# Patient Record
Sex: MALE | Race: WHITE | NOT HISPANIC OR LATINO | Employment: OTHER | URBAN - METROPOLITAN AREA
[De-identification: names, ages, dates, MRNs, and addresses within clinical notes are randomized per-mention and may not be internally consistent; named-entity substitution may affect disease eponyms.]

---

## 2017-03-09 RX ORDER — LOSARTAN POTASSIUM 100 MG/1
100 TABLET ORAL DAILY
Qty: 90 TAB | Refills: 0 | Status: SHIPPED | OUTPATIENT
Start: 2017-03-09 | End: 2017-06-02 | Stop reason: SDUPTHER

## 2017-03-09 RX ORDER — LOSARTAN POTASSIUM 100 MG/1
100 TABLET ORAL
COMMUNITY
Start: 2016-04-11 | End: 2017-03-09 | Stop reason: SDUPTHER

## 2017-03-09 NOTE — TELEPHONE ENCOUNTER
Was the patient seen in the last year in this department? No     Does patient have an active prescription for medications requested? No     Received Request Via: Patient     Patient is establishing care from [facility] in June.

## 2017-06-02 ENCOUNTER — OFFICE VISIT (OUTPATIENT)
Dept: MEDICAL GROUP | Facility: MEDICAL CENTER | Age: 62
End: 2017-06-02
Payer: COMMERCIAL

## 2017-06-02 VITALS
OXYGEN SATURATION: 98 % | TEMPERATURE: 98.1 F | DIASTOLIC BLOOD PRESSURE: 78 MMHG | SYSTOLIC BLOOD PRESSURE: 124 MMHG | HEART RATE: 60 BPM | HEIGHT: 64 IN | BODY MASS INDEX: 25.27 KG/M2 | WEIGHT: 148 LBS

## 2017-06-02 DIAGNOSIS — Z13.0 SCREENING FOR DEFICIENCY ANEMIA: ICD-10-CM

## 2017-06-02 DIAGNOSIS — Z13.1 SCREENING FOR DIABETES MELLITUS: ICD-10-CM

## 2017-06-02 DIAGNOSIS — N40.1 BPH WITH OBSTRUCTION/LOWER URINARY TRACT SYMPTOMS: ICD-10-CM

## 2017-06-02 DIAGNOSIS — I10 ESSENTIAL (PRIMARY) HYPERTENSION: ICD-10-CM

## 2017-06-02 DIAGNOSIS — N13.8 BPH WITH OBSTRUCTION/LOWER URINARY TRACT SYMPTOMS: ICD-10-CM

## 2017-06-02 DIAGNOSIS — Z12.5 SCREENING FOR PROSTATE CANCER: ICD-10-CM

## 2017-06-02 DIAGNOSIS — E78.2 MULTIPLE-TYPE HYPERLIPIDEMIA: ICD-10-CM

## 2017-06-02 DIAGNOSIS — Z13.29 SCREENING FOR THYROID DISORDER: ICD-10-CM

## 2017-06-02 DIAGNOSIS — Z11.59 NEED FOR HEPATITIS C SCREENING TEST: ICD-10-CM

## 2017-06-02 DIAGNOSIS — Z12.11 SCREENING FOR COLON CANCER: ICD-10-CM

## 2017-06-02 DIAGNOSIS — N43.40 SPERMATOCELE: ICD-10-CM

## 2017-06-02 PROCEDURE — 99214 OFFICE O/P EST MOD 30 MIN: CPT | Performed by: FAMILY MEDICINE

## 2017-06-02 RX ORDER — LOSARTAN POTASSIUM 100 MG/1
100 TABLET ORAL DAILY
Qty: 90 TAB | Refills: 3 | Status: SHIPPED | OUTPATIENT
Start: 2017-06-02 | End: 2018-03-27 | Stop reason: SDUPTHER

## 2017-06-02 NOTE — MR AVS SNAPSHOT
"        Chaparro Toddi   2017 9:40 AM   Office Visit   MRN: [id number]    Department:  [facility]   Dept Phone:  [phone]    Description:  Male : 1955   Provider:  Marylin Barry M.D.           Reason for Visit     Establish Care     Medication Refill           Allergies as of 2017     No Known Allergies      You were diagnosed with     BPH with obstruction/lower urinary tract symptoms   [969883]       Essential (primary) hypertension   [171164]       Multiple-type hyperlipidemia   [500928]       Spermatocele   [608.1.ICD-9-CM]       Screening for deficiency anemia   [142097]       Screening for diabetes mellitus   [V77.1.ICD-9-CM]       Screening for thyroid disorder   [V77.0.ICD-9-CM]       Screening for colon cancer   [509210]       Screening for prostate cancer   [236307]       Need for hepatitis C screening test   [060627]         Vital Signs     Blood Pressure Pulse Temperature Height Weight Body Mass Index    124/78 mmHg 60 36.7 °C (98.1 °F) 1.626 m (5' 4\") 67.132 kg (148 lb) 25.39 kg/m2    Oxygen Saturation                   98%           Basic Information     Date Of Birth Sex Race Ethnicity Preferred Language    1955 Male White Non- English      Problem List              ICD-10-CM Priority Class Noted - Resolved    Essential (primary) hypertension I10   3/23/2016 - Present    Multiple-type hyperlipidemia E78.4   3/23/2016 - Present    BPH with obstruction/lower urinary tract symptoms N40.1, N13.8   2017 - Present    Spermatocele N43.40   2017 - Present      Health Maintenance        Date Due Completion Dates    IMM DTaP/Tdap/Td Vaccine (1 - Tdap) 1972 ---    COLONOSCOPY 2003 ---    IMM ZOSTER VACCINE 2013 ---            Current Immunizations     No immunizations on file.      Below and/or attached are the medications your provider expects you to take. Review all of your home medications and newly ordered medications with your provider and/or " pharmacist. Follow medication instructions as directed by your provider and/or pharmacist. Please keep your medication list with you and share with your provider. Update the information when medications are discontinued, doses are changed, or new medications (including over-the-counter products) are added; and carry medication information at all times in the event of emergency situations     Allergies:  No Known Allergies          Medications  Valid as of: June 02, 2017 - 11:56 AM    Generic Name Brand Name Tablet Size Instructions for use    Losartan Potassium (Tab) COZAAR 100 MG Take 1 Tab by mouth every day.        .                 Medicines prescribed today were sent to:     [facility] [address] [address]    Phone: [phone] Fax: [phone]    Open 24 Hours?: No      Medication refill instructions:       If your prescription bottle indicates you have medication refills left, it is not necessary to call your provider’s office. Please contact your pharmacy and they will refill your medication.    If your prescription bottle indicates you do not have any refills left, you may request refills at any time through one of the following ways: The online [employer] system (except Urgent Care), by calling your provider’s office, or by asking your pharmacy to contact your provider’s office with a refill request. Medication refills are processed only during regular business hours and may not be available until the next business day. Your provider may request additional information or to have a follow-up visit with you prior to refilling your medication.   *Please Note: Medication refills are assigned a new Rx number when refilled electronically. Your pharmacy may indicate that no refills were authorized even though a new prescription for the same medication is available at the pharmacy. Please request the medicine by name with the pharmacy  before contacting your provider for a refill.        Referral     A referral request has been sent to our patient care coordination department. Please allow 3-5 business days for us to process this request and contact you either by phone or mail. If you do not hear from us by the 5th business day, please call us at [phone].           MyChart Status: Patient Declined

## 2017-06-07 NOTE — ASSESSMENT & PLAN NOTE
Dyslipidemia Chronic condition. Current treatments: diet/exercise. He he was on medication in the past but does not want to go back on it. He would like to manage with diet and exercise.

## 2017-06-07 NOTE — ASSESSMENT & PLAN NOTE
Patient has a history of a spermatocele that was found on ultrasound. Patient never did anything about it but he thinks it might be enlarging. He is requesting that he be referred to a urologist.

## 2017-06-07 NOTE — ASSESSMENT & PLAN NOTE
Patient complains of increased urination throughout the day and having to get up at night once or twice. He has difficulty starting his stream and it is less than before. He has been diagnosed with BPH in the past but the symptoms seem to be worsening. He is not comfortable with me doing a digital rectal exam on him and would like a referral to urology.   He denies any hematuria or dark urine.

## 2017-06-07 NOTE — ASSESSMENT & PLAN NOTE
Stable. Currently taking losartan 100 as directed.   He is not taking baby aspirin daily.   He is not monitoring BP at home.   Denies symptoms low BP: light-headed, tunnel-vision, unusual fatigue.   Denies symptoms high BP:pounding headache, visual changes, palpitations, flushed face.   Denies medicine side effects: unusual fatigue, slow heartbeat, foot/leg swelling, cough.

## 2017-06-07 NOTE — PROGRESS NOTES
Chief Complaint   Patient presents with   • Establish Care   • Medication Refill     Chaparro is here to establish care at this office. he is a previous patient of mine at the [facility].      Chaparro Rosenberg is a 62 y.o. male here to establish care and for evaluation and management of:        HPI:    BPH with obstruction/lower urinary tract symptoms  Patient complains of increased urination throughout the day and having to get up at night once or twice. He has difficulty starting his stream and it is less than before. He has been diagnosed with BPH in the past but the symptoms seem to be worsening. He is not comfortable with me doing a digital rectal exam on him and would like a referral to urology.   He denies any hematuria or dark urine.     Multiple-type hyperlipidemia  Dyslipidemia Chronic condition. Current treatments: diet/exercise. He he was on medication in the past but does not want to go back on it. He would like to manage with diet and exercise.    Essential (primary) hypertension  Stable. Currently taking losartan 100 as directed.   He is not taking baby aspirin daily.   He is not monitoring BP at home.   Denies symptoms low BP: light-headed, tunnel-vision, unusual fatigue.   Denies symptoms high BP:pounding headache, visual changes, palpitations, flushed face.   Denies medicine side effects: unusual fatigue, slow heartbeat, foot/leg swelling, cough.      Spermatocele  Patient has a history of a spermatocele that was found on ultrasound. Patient never did anything about it but he thinks it might be enlarging. He is requesting that he be referred to a urologist.      No Known Allergies    Current medicines (including changes today)  Current Outpatient Prescriptions   Medication Sig Dispense Refill   • losartan (COZAAR) 100 MG Tab Take 1 Tab by mouth every day. 90 Tab 3     No current facility-administered medications for this visit.     He  has a past medical history of Hyperlipidemia and Hypertension.  He  has  "past surgical history that includes hernia repair.  Social History   Substance Use Topics   • Smoking status: Former Smoker -- 1.00 packs/day for 20 years     Types: Cigarettes     Quit date: 1989   • Smokeless tobacco: Never Used   • Alcohol Use: 3.6 oz/week     3 Glasses of wine, 3 Shots of liquor per week     Social History     Social History Narrative     Family History   Problem Relation Age of Onset   • Arthritis Mother    • Stroke Mother    • Hypertension Father      Family Status   Relation Status Death Age   • Mother     • Father     • Sister Alive          ROS  No fever or chills.  No nausea or vomiting.  No chest pain or palpitations.  No cough or SOB.  No pain with urination or hematuria.  No black or bloody stools.  All other systems reviewed and are negative     Objective:     Blood pressure 124/78, pulse 60, temperature 36.7 °C (98.1 °F), height 1.626 m (5' 4\"), weight 67.132 kg (148 lb), SpO2 98 %. Body mass index is 25.39 kg/(m^2).  Physical Exam:      Well developed, well nourished.  Alert, oriented in no acute distress.  Psych: Eye contact is good, speech goal directed, affect calm  Eyes: conjunctiva non-injected, sclera non-icteric.  Neck Supple.  No adenopathy or masses in the neck or supraclavicular regions. No thyromegaly  Lungs: clear to auscultation bilaterally with good excursion. No wheezes or rhonchi  CV: regular rate and rhythm. No murmur  Abdomen: soft, nontender, no masses or organomegaly.  No rebound or guarding  Ext: no edema, color normal, vascularity normal, temperature normal      Assessment and Plan:   The following treatment plan was discussed    1. BPH with obstruction/lower urinary tract symptoms  Check PSA. Refer to urology for further evaluation and treatment.  - PROSTATE SPECIFIC AG  - REFERRAL TO UROLOGY    2. Essential (primary) hypertension  Check chemistry panel. Continue losartan 100 mg daily  - COMP METABOLIC PANEL  - losartan (COZAAR) 100 MG " Tab; Take 1 Tab by mouth every day.  Dispense: 90 Tab; Refill: 3    3. Multiple-type hyperlipidemia  Dietary counseling done. Encouraged continued exercise. Check lipid panel  - LIPID PANEL W/ CHOL/HDL RATIO    4. Spermatocele  Refer to urology  - REFERRAL TO UROLOGY    5. Screening for deficiency anemia  Screening labs ordered.  Await results for counseling.    - CBC WITH DIFFERENTIAL    6. Screening for diabetes mellitus  Screening labs ordered.  Await results for counseling.    - COMP METABOLIC PANEL    7. Screening for thyroid disorder  Screening labs ordered.  Await results for counseling.    - TSH    8. Screening for colon cancer  Screening labs ordered.  Await results for counseling.    - REFERRAL TO GASTROENTEROLOGY    9. Screening for prostate cancer  Screening labs ordered.  Await results for counseling.    - PROSTATE SPECIFIC AG    10. Need for hepatitis C screening test  Screening labs ordered.  Await results for counseling.    - HEP C VIRUS ANTIBODY      Records requested.    Any change or worsening of signs or symptoms, patient encouraged to follow-up or report to the emergency room for further evaluation. Patient understands and agrees.    Followup: Return in about 1 year (around 6/2/2018).

## 2017-06-15 LAB
ALBUMIN SERPL-MCNC: 4.3 G/DL (ref 3.6–4.8)
ALBUMIN/GLOB SERPL: 2.2 {RATIO} (ref 1.2–2.2)
ALP SERPL-CCNC: 54 IU/L (ref 39–117)
ALT SERPL-CCNC: 23 IU/L (ref 0–44)
AST SERPL-CCNC: 20 IU/L (ref 0–40)
BASOPHILS # BLD AUTO: 0 X10E3/UL (ref 0–0.2)
BASOPHILS NFR BLD AUTO: 1 %
BILIRUB SERPL-MCNC: 0.3 MG/DL (ref 0–1.2)
BUN SERPL-MCNC: 14 MG/DL (ref 8–27)
BUN/CREAT SERPL: 15 (ref 10–24)
CALCIUM SERPL-MCNC: 9.2 MG/DL (ref 8.6–10.2)
CHLORIDE SERPL-SCNC: 104 MMOL/L (ref 96–106)
CHOLEST SERPL-MCNC: 181 MG/DL (ref 100–199)
CHOLEST/HDLC SERPL: 3.4 RATIO UNITS (ref 0–5)
CO2 SERPL-SCNC: 21 MMOL/L (ref 18–29)
COMMENT 011824: ABNORMAL
CREAT SERPL-MCNC: 0.96 MG/DL (ref 0.76–1.27)
EOSINOPHIL # BLD AUTO: 0 X10E3/UL (ref 0–0.4)
EOSINOPHIL NFR BLD AUTO: 0 %
ERYTHROCYTE [DISTWIDTH] IN BLOOD BY AUTOMATED COUNT: 15.1 % (ref 12.3–15.4)
GLOBULIN SER CALC-MCNC: 2 G/DL (ref 1.5–4.5)
GLUCOSE SERPL-MCNC: 97 MG/DL (ref 65–99)
HCT VFR BLD AUTO: 45.3 % (ref 37.5–51)
HCV AB S/CO SERPL IA: <0.1 S/CO RATIO (ref 0–0.9)
HDLC SERPL-MCNC: 54 MG/DL
HGB BLD-MCNC: 15.3 G/DL (ref 12.6–17.7)
IMM GRANULOCYTES # BLD: 0 X10E3/UL (ref 0–0.1)
IMM GRANULOCYTES NFR BLD: 0 %
IMMATURE CELLS  115398: NORMAL
LDLC SERPL CALC-MCNC: 101 MG/DL (ref 0–99)
LYMPHOCYTES # BLD AUTO: 2.4 X10E3/UL (ref 0.7–3.1)
LYMPHOCYTES NFR BLD AUTO: 40 %
MCH RBC QN AUTO: 31.3 PG (ref 26.6–33)
MCHC RBC AUTO-ENTMCNC: 33.8 G/DL (ref 31.5–35.7)
MCV RBC AUTO: 93 FL (ref 79–97)
MONOCYTES # BLD AUTO: 0.5 X10E3/UL (ref 0.1–0.9)
MONOCYTES NFR BLD AUTO: 8 %
MORPHOLOGY BLD-IMP: NORMAL
NEUTROPHILS # BLD AUTO: 3.2 X10E3/UL (ref 1.4–7)
NEUTROPHILS NFR BLD AUTO: 51 %
NRBC BLD AUTO-RTO: NORMAL %
PLATELET # BLD AUTO: 262 X10E3/UL (ref 150–379)
POTASSIUM SERPL-SCNC: 4.5 MMOL/L (ref 3.5–5.2)
PROT SERPL-MCNC: 6.3 G/DL (ref 6–8.5)
PSA SERPL-MCNC: 1 NG/ML (ref 0–4)
RBC # BLD AUTO: 4.89 X10E6/UL (ref 4.14–5.8)
SODIUM SERPL-SCNC: 139 MMOL/L (ref 134–144)
TRIGL SERPL-MCNC: 130 MG/DL (ref 0–149)
TSH SERPL DL<=0.005 MIU/L-ACNC: 2.64 UIU/ML (ref 0.45–4.5)
VLDLC SERPL CALC-MCNC: 26 MG/DL (ref 5–40)
WBC # BLD AUTO: 6.2 X10E3/UL (ref 3.4–10.8)

## 2017-06-16 ENCOUNTER — TELEPHONE (OUTPATIENT)
Dept: MEDICAL GROUP | Facility: MEDICAL CENTER | Age: 62
End: 2017-06-16

## 2017-06-16 NOTE — TELEPHONE ENCOUNTER
----- Message from Marylin Barry M.D. sent at 6/16/2017 11:25 AM PDT -----  Please notify patient of their normal lab results.  Marylin Barry M.D.

## 2018-01-04 ENCOUNTER — TELEPHONE (OUTPATIENT)
Dept: MEDICAL GROUP | Facility: MEDICAL CENTER | Age: 63
End: 2018-01-04

## 2018-01-04 DIAGNOSIS — N40.1 BPH WITH OBSTRUCTION/LOWER URINARY TRACT SYMPTOMS: ICD-10-CM

## 2018-01-04 DIAGNOSIS — N13.8 BPH WITH OBSTRUCTION/LOWER URINARY TRACT SYMPTOMS: ICD-10-CM

## 2018-01-04 DIAGNOSIS — N43.40 SPERMATOCELE: ICD-10-CM

## 2018-01-05 NOTE — TELEPHONE ENCOUNTER
1. Caller Name: Chaparro Rosenberg                        Call Back Number: [phone] (home) [phone] (work)      2. Message: Patient called states he was referred [place] [facility] [place] last year and was never taken care of properly he needs to also have a hernia repair patient is wondering if you could place a referral to another urology group who will be able to help him and will take care of him as soon as they can.      3. Patient approves office to leave a detailed voicemail/MyChart message: yes

## 2018-03-27 DIAGNOSIS — I10 ESSENTIAL (PRIMARY) HYPERTENSION: ICD-10-CM

## 2018-03-27 RX ORDER — LOSARTAN POTASSIUM 100 MG/1
100 TABLET ORAL DAILY
Qty: 90 TAB | Refills: 0 | Status: SHIPPED | OUTPATIENT
Start: 2018-03-27 | End: 2018-07-06 | Stop reason: SDUPTHER

## 2018-03-27 NOTE — TELEPHONE ENCOUNTER
Was the patient seen in the last year in this department? Yes     Does patient have an active prescription for medications requested? No     Received Request Via: Patient     Pt called and states he is running very low on his BP medication. Asking to have this sent to [facility] in [place] as he no longer has [place] insurance. Pharmacy updated in chart.

## 2018-03-29 ENCOUNTER — HOSPITAL ENCOUNTER (INPATIENT)
Facility: MEDICAL CENTER | Age: 63
LOS: 6 days | DRG: 863 | End: 2018-04-04
Attending: EMERGENCY MEDICINE | Admitting: STUDENT IN AN ORGANIZED HEALTH CARE EDUCATION/TRAINING PROGRAM
Payer: MEDICARE

## 2018-03-29 ENCOUNTER — APPOINTMENT (OUTPATIENT)
Dept: RADIOLOGY | Facility: MEDICAL CENTER | Age: 63
DRG: 863 | End: 2018-03-29
Attending: EMERGENCY MEDICINE
Payer: MEDICARE

## 2018-03-29 ENCOUNTER — RESOLUTE PROFESSIONAL BILLING HOSPITAL PROF FEE (OUTPATIENT)
Dept: HOSPITALIST | Facility: MEDICAL CENTER | Age: 63
End: 2018-03-29
Payer: MEDICARE

## 2018-03-29 DIAGNOSIS — N49.2 SCROTAL INFECTION: ICD-10-CM

## 2018-03-29 DIAGNOSIS — L03.314 CELLULITIS OF GROIN: ICD-10-CM

## 2018-03-29 PROBLEM — D72.829 LEUKOCYTOSIS: Status: ACTIVE | Noted: 2018-03-29

## 2018-03-29 PROBLEM — L03.90 CELLULITIS: Status: ACTIVE | Noted: 2018-03-29

## 2018-03-29 PROBLEM — N43.3 HYDROCELE: Status: ACTIVE | Noted: 2018-03-29

## 2018-03-29 LAB
ALBUMIN SERPL BCP-MCNC: 3.3 G/DL (ref 3.2–4.9)
ALBUMIN/GLOB SERPL: 1.2 G/DL
ALP SERPL-CCNC: 54 U/L (ref 30–99)
ALT SERPL-CCNC: 15 U/L (ref 2–50)
ANION GAP SERPL CALC-SCNC: 6 MMOL/L (ref 0–11.9)
APPEARANCE UR: CLEAR
AST SERPL-CCNC: 17 U/L (ref 12–45)
BASOPHILS # BLD AUTO: 0.2 % (ref 0–1.8)
BASOPHILS # BLD: 0.03 K/UL (ref 0–0.12)
BILIRUB SERPL-MCNC: 0.5 MG/DL (ref 0.1–1.5)
BILIRUB UR QL STRIP.AUTO: NEGATIVE
BUN SERPL-MCNC: 19 MG/DL (ref 8–22)
CALCIUM SERPL-MCNC: 8.2 MG/DL (ref 8.4–10.2)
CHLORIDE SERPL-SCNC: 101 MMOL/L (ref 96–112)
CO2 SERPL-SCNC: 23 MMOL/L (ref 20–33)
COLOR UR: YELLOW
CREAT SERPL-MCNC: 1.1 MG/DL (ref 0.5–1.4)
EOSINOPHIL # BLD AUTO: 0.01 K/UL (ref 0–0.51)
EOSINOPHIL NFR BLD: 0.1 % (ref 0–6.9)
ERYTHROCYTE [DISTWIDTH] IN BLOOD BY AUTOMATED COUNT: 40.7 FL (ref 35.9–50)
GLOBULIN SER CALC-MCNC: 2.7 G/DL (ref 1.9–3.5)
GLUCOSE SERPL-MCNC: 88 MG/DL (ref 65–99)
GLUCOSE UR STRIP.AUTO-MCNC: NEGATIVE MG/DL
HCT VFR BLD AUTO: 37.9 % (ref 42–52)
HGB BLD-MCNC: 13.3 G/DL (ref 14–18)
IMM GRANULOCYTES # BLD AUTO: 0.06 K/UL (ref 0–0.11)
IMM GRANULOCYTES NFR BLD AUTO: 0.4 % (ref 0–0.9)
KETONES UR STRIP.AUTO-MCNC: 15 MG/DL
LACTATE BLD-SCNC: 1.17 MMOL/L (ref 0.5–2)
LEUKOCYTE ESTERASE UR QL STRIP.AUTO: NEGATIVE
LYMPHOCYTES # BLD AUTO: 3.01 K/UL (ref 1–4.8)
LYMPHOCYTES NFR BLD: 21 % (ref 22–41)
MCH RBC QN AUTO: 31.1 PG (ref 27–33)
MCHC RBC AUTO-ENTMCNC: 35.1 G/DL (ref 33.7–35.3)
MCV RBC AUTO: 88.8 FL (ref 81.4–97.8)
MICRO URNS: ABNORMAL
MONOCYTES # BLD AUTO: 1.2 K/UL (ref 0–0.85)
MONOCYTES NFR BLD AUTO: 8.4 % (ref 0–13.4)
NEUTROPHILS # BLD AUTO: 10.01 K/UL (ref 1.82–7.42)
NEUTROPHILS NFR BLD: 69.9 % (ref 44–72)
NITRITE UR QL STRIP.AUTO: NEGATIVE
NRBC # BLD AUTO: 0 K/UL
NRBC BLD-RTO: 0 /100 WBC
PH UR STRIP.AUTO: 5.5 [PH]
PLATELET # BLD AUTO: 181 K/UL (ref 164–446)
PMV BLD AUTO: 11.5 FL (ref 9–12.9)
POTASSIUM SERPL-SCNC: 3.7 MMOL/L (ref 3.6–5.5)
PROT SERPL-MCNC: 6 G/DL (ref 6–8.2)
PROT UR QL STRIP: NEGATIVE MG/DL
RBC # BLD AUTO: 4.27 M/UL (ref 4.7–6.1)
RBC UR QL AUTO: NEGATIVE
SODIUM SERPL-SCNC: 130 MMOL/L (ref 135–145)
SP GR UR STRIP.AUTO: 1.02
WBC # BLD AUTO: 14.3 K/UL (ref 4.8–10.8)

## 2018-03-29 PROCEDURE — 770006 HCHG ROOM/CARE - MED/SURG/GYN SEMI*

## 2018-03-29 PROCEDURE — 81003 URINALYSIS AUTO W/O SCOPE: CPT

## 2018-03-29 PROCEDURE — 87086 URINE CULTURE/COLONY COUNT: CPT

## 2018-03-29 PROCEDURE — 96367 TX/PROPH/DG ADDL SEQ IV INF: CPT

## 2018-03-29 PROCEDURE — 700111 HCHG RX REV CODE 636 W/ 250 OVERRIDE (IP)

## 2018-03-29 PROCEDURE — 87040 BLOOD CULTURE FOR BACTERIA: CPT | Mod: 91

## 2018-03-29 PROCEDURE — 76870 US EXAM SCROTUM: CPT

## 2018-03-29 PROCEDURE — 99222 1ST HOSP IP/OBS MODERATE 55: CPT | Mod: AI | Performed by: STUDENT IN AN ORGANIZED HEALTH CARE EDUCATION/TRAINING PROGRAM

## 2018-03-29 PROCEDURE — 96365 THER/PROPH/DIAG IV INF INIT: CPT

## 2018-03-29 PROCEDURE — 85025 COMPLETE CBC W/AUTO DIFF WBC: CPT

## 2018-03-29 PROCEDURE — 36415 COLL VENOUS BLD VENIPUNCTURE: CPT

## 2018-03-29 PROCEDURE — 99285 EMERGENCY DEPT VISIT HI MDM: CPT

## 2018-03-29 PROCEDURE — 96366 THER/PROPH/DIAG IV INF ADDON: CPT

## 2018-03-29 PROCEDURE — 700105 HCHG RX REV CODE 258

## 2018-03-29 PROCEDURE — 83605 ASSAY OF LACTIC ACID: CPT

## 2018-03-29 PROCEDURE — 80053 COMPREHEN METABOLIC PANEL: CPT

## 2018-03-29 RX ORDER — IBUPROFEN 400 MG/1
400 TABLET ORAL EVERY 6 HOURS PRN
Status: DISCONTINUED | OUTPATIENT
Start: 2018-03-29 | End: 2018-04-04 | Stop reason: HOSPADM

## 2018-03-29 RX ORDER — SULFAMETHOXAZOLE AND TRIMETHOPRIM 800; 160 MG/1; MG/1
1 TABLET ORAL 2 TIMES DAILY
Status: ON HOLD | COMMUNITY
End: 2018-04-04

## 2018-03-29 RX ORDER — LOSARTAN POTASSIUM 25 MG/1
100 TABLET ORAL DAILY
Status: DISCONTINUED | OUTPATIENT
Start: 2018-03-30 | End: 2018-04-04 | Stop reason: HOSPADM

## 2018-03-29 RX ORDER — ACETAMINOPHEN 325 MG/1
650 TABLET ORAL EVERY 6 HOURS PRN
Status: DISCONTINUED | OUTPATIENT
Start: 2018-03-29 | End: 2018-04-04 | Stop reason: HOSPADM

## 2018-03-29 RX ORDER — AMOXICILLIN 250 MG
2 CAPSULE ORAL 2 TIMES DAILY
Status: DISCONTINUED | OUTPATIENT
Start: 2018-03-29 | End: 2018-04-04 | Stop reason: HOSPADM

## 2018-03-29 RX ORDER — POLYETHYLENE GLYCOL 3350 17 G/17G
1 POWDER, FOR SOLUTION ORAL
Status: DISCONTINUED | OUTPATIENT
Start: 2018-03-29 | End: 2018-04-04 | Stop reason: HOSPADM

## 2018-03-29 RX ORDER — BISACODYL 10 MG
10 SUPPOSITORY, RECTAL RECTAL
Status: DISCONTINUED | OUTPATIENT
Start: 2018-03-29 | End: 2018-04-04 | Stop reason: HOSPADM

## 2018-03-29 RX ORDER — ONDANSETRON 2 MG/ML
4 INJECTION INTRAMUSCULAR; INTRAVENOUS EVERY 4 HOURS PRN
Status: DISCONTINUED | OUTPATIENT
Start: 2018-03-29 | End: 2018-04-04 | Stop reason: HOSPADM

## 2018-03-29 RX ADMIN — VANCOMYCIN HYDROCHLORIDE 1700 MG: 1 INJECTION, POWDER, LYOPHILIZED, FOR SOLUTION INTRAVENOUS at 21:43

## 2018-03-29 RX ADMIN — AMPICILLIN AND SULBACTAM 3 G: 2; 1 INJECTION, POWDER, FOR SOLUTION INTRAMUSCULAR; INTRAVENOUS at 20:50

## 2018-03-29 ASSESSMENT — LIFESTYLE VARIABLES
ON A TYPICAL DAY WHEN YOU DRINK ALCOHOL HOW MANY DRINKS DO YOU HAVE: 1
CONSUMPTION TOTAL: NEGATIVE
EVER_SMOKED: YES
EVER HAD A DRINK FIRST THING IN THE MORNING TO STEADY YOUR NERVES TO GET RID OF A HANGOVER: NO
EVER FELT BAD OR GUILTY ABOUT YOUR DRINKING: NO
TOTAL SCORE: 0
AVERAGE NUMBER OF DAYS PER WEEK YOU HAVE A DRINK CONTAINING ALCOHOL: 1
HAVE YOU EVER FELT YOU SHOULD CUT DOWN ON YOUR DRINKING: NO
HAVE PEOPLE ANNOYED YOU BY CRITICIZING YOUR DRINKING: NO
HOW MANY TIMES IN THE PAST YEAR HAVE YOU HAD 5 OR MORE DRINKS IN A DAY: 0
ALCOHOL_USE: YES
TOTAL SCORE: 0
TOTAL SCORE: 0

## 2018-03-29 ASSESSMENT — ENCOUNTER SYMPTOMS
MYALGIAS: 0
COUGH: 0
VOMITING: 0
HEADACHES: 0
CHILLS: 1
SPUTUM PRODUCTION: 0
ORTHOPNEA: 0
DEPRESSION: 0
HEARTBURN: 0
DIARRHEA: 0
SHORTNESS OF BREATH: 0
HEMOPTYSIS: 0
PALPITATIONS: 0
NAUSEA: 0
BLURRED VISION: 0
CLAUDICATION: 0
DIZZINESS: 0
NECK PAIN: 0
ABDOMINAL PAIN: 0
FEVER: 1

## 2018-03-29 ASSESSMENT — PAIN SCALES - GENERAL
PAINLEVEL_OUTOF10: 2
PAINLEVEL_OUTOF10: 1

## 2018-03-30 PROBLEM — E87.6 HYPOKALEMIA: Status: ACTIVE | Noted: 2018-03-30

## 2018-03-30 PROBLEM — E87.1 HYPONATREMIA: Status: ACTIVE | Noted: 2018-03-30

## 2018-03-30 LAB
ANION GAP SERPL CALC-SCNC: 5 MMOL/L (ref 0–11.9)
BASOPHILS # BLD AUTO: 0.4 % (ref 0–1.8)
BASOPHILS # BLD: 0.04 K/UL (ref 0–0.12)
BUN SERPL-MCNC: 13 MG/DL (ref 8–22)
CALCIUM SERPL-MCNC: 7.6 MG/DL (ref 8.4–10.2)
CHLORIDE SERPL-SCNC: 106 MMOL/L (ref 96–112)
CO2 SERPL-SCNC: 23 MMOL/L (ref 20–33)
CREAT SERPL-MCNC: 0.97 MG/DL (ref 0.5–1.4)
EOSINOPHIL # BLD AUTO: 0.01 K/UL (ref 0–0.51)
EOSINOPHIL NFR BLD: 0.1 % (ref 0–6.9)
ERYTHROCYTE [DISTWIDTH] IN BLOOD BY AUTOMATED COUNT: 41.1 FL (ref 35.9–50)
GLUCOSE SERPL-MCNC: 158 MG/DL (ref 65–99)
HCT VFR BLD AUTO: 36 % (ref 42–52)
HGB BLD-MCNC: 12.5 G/DL (ref 14–18)
IMM GRANULOCYTES # BLD AUTO: 0.05 K/UL (ref 0–0.11)
IMM GRANULOCYTES NFR BLD AUTO: 0.5 % (ref 0–0.9)
LYMPHOCYTES # BLD AUTO: 2.45 K/UL (ref 1–4.8)
LYMPHOCYTES NFR BLD: 22.5 % (ref 22–41)
MCH RBC QN AUTO: 30.9 PG (ref 27–33)
MCHC RBC AUTO-ENTMCNC: 34.7 G/DL (ref 33.7–35.3)
MCV RBC AUTO: 88.9 FL (ref 81.4–97.8)
MONOCYTES # BLD AUTO: 1.04 K/UL (ref 0–0.85)
MONOCYTES NFR BLD AUTO: 9.6 % (ref 0–13.4)
NEUTROPHILS # BLD AUTO: 7.3 K/UL (ref 1.82–7.42)
NEUTROPHILS NFR BLD: 66.9 % (ref 44–72)
NRBC # BLD AUTO: 0 K/UL
NRBC BLD-RTO: 0 /100 WBC
PLATELET # BLD AUTO: 167 K/UL (ref 164–446)
PMV BLD AUTO: 11.6 FL (ref 9–12.9)
POTASSIUM SERPL-SCNC: 3.3 MMOL/L (ref 3.6–5.5)
RBC # BLD AUTO: 4.05 M/UL (ref 4.7–6.1)
SODIUM SERPL-SCNC: 134 MMOL/L (ref 135–145)
WBC # BLD AUTO: 10.9 K/UL (ref 4.8–10.8)

## 2018-03-30 PROCEDURE — A9270 NON-COVERED ITEM OR SERVICE: HCPCS | Performed by: STUDENT IN AN ORGANIZED HEALTH CARE EDUCATION/TRAINING PROGRAM

## 2018-03-30 PROCEDURE — 80048 BASIC METABOLIC PNL TOTAL CA: CPT

## 2018-03-30 PROCEDURE — 700105 HCHG RX REV CODE 258: Performed by: STUDENT IN AN ORGANIZED HEALTH CARE EDUCATION/TRAINING PROGRAM

## 2018-03-30 PROCEDURE — 700102 HCHG RX REV CODE 250 W/ 637 OVERRIDE(OP): Performed by: STUDENT IN AN ORGANIZED HEALTH CARE EDUCATION/TRAINING PROGRAM

## 2018-03-30 PROCEDURE — 700102 HCHG RX REV CODE 250 W/ 637 OVERRIDE(OP): Performed by: INTERNAL MEDICINE

## 2018-03-30 PROCEDURE — 770006 HCHG ROOM/CARE - MED/SURG/GYN SEMI*

## 2018-03-30 PROCEDURE — 700111 HCHG RX REV CODE 636 W/ 250 OVERRIDE (IP): Performed by: STUDENT IN AN ORGANIZED HEALTH CARE EDUCATION/TRAINING PROGRAM

## 2018-03-30 PROCEDURE — 99232 SBSQ HOSP IP/OBS MODERATE 35: CPT | Performed by: INTERNAL MEDICINE

## 2018-03-30 PROCEDURE — 36415 COLL VENOUS BLD VENIPUNCTURE: CPT

## 2018-03-30 PROCEDURE — A9270 NON-COVERED ITEM OR SERVICE: HCPCS | Performed by: INTERNAL MEDICINE

## 2018-03-30 PROCEDURE — 85025 COMPLETE CBC W/AUTO DIFF WBC: CPT

## 2018-03-30 RX ORDER — DOXYCYCLINE 100 MG/1
100 TABLET ORAL EVERY 12 HOURS
Status: DISCONTINUED | OUTPATIENT
Start: 2018-03-30 | End: 2018-03-31

## 2018-03-30 RX ADMIN — PIPERACILLIN AND TAZOBACTAM 3.38 G: 3; .375 INJECTION, POWDER, FOR SOLUTION INTRAVENOUS at 01:55

## 2018-03-30 RX ADMIN — DOXYCYCLINE 100 MG: 100 TABLET ORAL at 11:16

## 2018-03-30 RX ADMIN — DOXYCYCLINE 100 MG: 100 TABLET ORAL at 20:01

## 2018-03-30 RX ADMIN — PIPERACILLIN AND TAZOBACTAM 3.38 G: 3; .375 INJECTION, POWDER, FOR SOLUTION INTRAVENOUS at 13:00

## 2018-03-30 RX ADMIN — PIPERACILLIN AND TAZOBACTAM 3.38 G: 3; .375 INJECTION, POWDER, FOR SOLUTION INTRAVENOUS at 05:22

## 2018-03-30 RX ADMIN — PIPERACILLIN AND TAZOBACTAM 3.38 G: 3; .375 INJECTION, POWDER, FOR SOLUTION INTRAVENOUS at 20:01

## 2018-03-30 RX ADMIN — LOSARTAN POTASSIUM 100 MG: 25 TABLET, FILM COATED ORAL at 08:50

## 2018-03-30 ASSESSMENT — ENCOUNTER SYMPTOMS
FEVER: 0
VOMITING: 0
DIZZINESS: 0
CHILLS: 0
BLURRED VISION: 0
ABDOMINAL PAIN: 0
NAUSEA: 0
SHORTNESS OF BREATH: 0

## 2018-03-30 ASSESSMENT — PATIENT HEALTH QUESTIONNAIRE - PHQ9
SUM OF ALL RESPONSES TO PHQ9 QUESTIONS 1 AND 2: 0
2. FEELING DOWN, DEPRESSED, IRRITABLE, OR HOPELESS: NOT AT ALL
1. LITTLE INTEREST OR PLEASURE IN DOING THINGS: NOT AT ALL

## 2018-03-30 ASSESSMENT — PAIN SCALES - GENERAL: PAINLEVEL_OUTOF10: 1

## 2018-03-30 NOTE — PROGRESS NOTES
Pt informed of NPO status for this morning. Waiting for MD rounds to hear the full plan for today.

## 2018-03-30 NOTE — CONSULTS
DATE OF SERVICE:  03/30/2018    UROLOGIC CONSULTATION    CONSULTATION REQUESTED BY:  Dr. Olson, emergency room physician.    REASON FOR CONSULTATION:  Regarding right scrotal cellulitis.    CONSULTATION PERFORMED BY:  Dr. Hi Mac, Urology, [facility].    CHIEF COMPLAINT:  This is a pleasant 63-year-old  with complaints of   right scrotal swelling of new onset since hydrocele aspiration on 03/28/2018.    HISTORY OF PRESENT ILLNESS:  The patient states he was in his usual state of   health when he was diagnosed with a moderate-to-large sized right hydrocele   and associated hernia.  He underwent a combined right hydrocelectomy by Dr. Jace Frankel and left laparoscopic herniorrhaphy by Dr. Tong.  In the   postoperative period, he did well; however, in a followup visit with Dr. Jace Frankel on March 28th, he was noted to have significant accumulation of   fluid in the right hemiscrotum.  He was given antibiotics and the fluid was   aspirated.  The fluid was serosanguineous and he had decompression, but   approximately 24 hours later he re-presented to the office and was seen by Dr. Mac Tello where he was found to have evidence of cellulitis.  Hospital   admission was recommended.  The patient initially went to [facility].  When the   [facility] clinician contacted me, I recommended that he go to [facility] or [facility] as he needs an ultrasound for further evaluation, which   they could not accomplish in the [facility] facility.  In addition, he also may   need surgical intervention if the scrotal swelling turns into an abscess.  The   patient drove himself down to [facility] with his wife and was admitted to   the hospital service.  He received intravenous antibiotics of vancomycin in   the emergency room and underwent an ultrasound, which I personally reviewed   and shows hematoma in the right hemiscrotum.    PAST MEDICAL HISTORY:  Noteworthy for hyperlipidemia and  hypertension.    PAST SURGICAL HISTORY:  He has had the hernia repair, and a right hydrocele.    MEDICATIONS:  Reviewed, is on Bactrim prior to admission.  He also takes   losartan.    SOCIAL HISTORY:  He is an .  He is a former smoker, smoked a pack a   day for 20 years, but quit in 1989.  He drinks alcohol of approximately 3   glasses of wine per week.    REVIEW OF SYSTEMS:  CONSTITUTIONAL:  He has had low-grade fever and mild chills.  He denies any   significant weight loss or weight gain.  HEENT:  He denies any hearing loss, visual loss, ringing in the ears.  He   denies congestion.  RESPIRATORY:  In terms of respiratory, he denies any shortness of breath,   hemoptysis or cough.  CARDIOVASCULAR:  Denies any skipped heartbeats, dyspnea on exertion, shortness   of breath or palpitations.  GASTROINTESTINAL:  He denies any abdominal pain, nausea or vomiting.  GENITOURINARY:  He denies any dysuria, urgency, frequency or hematuria.  MUSCULOSKELETAL:  He had some mild arthralgias and myalgias, but denies any   loss of strength.  NEUROLOGICALLY:  He denies any seizure or discoordination.  PSYCHIATRIC:  Denies depression or anxiety.  ENDOCRINE:  Denies excessive thirst or fatigue.  SKIN:  Denies any rash or pruritus.    PHYSICAL EXAMINATION:  GENERAL:  This is a very well-developed, well-nourished male, in no acute   distress.  VITAL SIGNS:  Stable.  He is afebrile with a T-max of 100.5.  HEENT:  Normocephalic, atraumatic.  Pupils are equal, round.  Sclerae are   nonicteric.  NECK:  Supple, without adenopathy.  No carotid bruit.  CHEST:  Clear to auscultation bilaterally.  CARDIOVASCULAR:  Regular rate and rhythm without murmur.  ABDOMEN:  Soft, nontender, and nondistended.  He has normoactive bowel sounds   and well healed trocar sites with no evidence of infection.  GENITOURINARY:  Shows the normal phallus.  He has right-sided swelling in the   hemiscrotum with some bluish discoloration consistent with  hematoma and mild   erythema.  The left testicle is palpable and is normal.  The right testicle, I   cannot palpate.  There is no fluctuance on the right side, but the scrotal   skin is thickened and it does extend across the midline to the left   hemiscrotum.  Anus is orthotopic.  Digital rectal exam deferred.  EXTREMITIES:  Without clubbing, cyanosis or edema.  Homans sign is negative.  NEUROLOGIC:  Cranial nerves II-XII are intact.  Motor and sensory examination   is nonfocal.  PSYCHIATRIC:  Normal mood and judgment.  MUSCULOSKELETAL:  No costovertebral angle tenderness.    LABORATORIES AND STUDIES:  His white count on 03/29/2018 is 14,300.  BUN and   creatinine are normal at 19 and 1.1 with a glucose of 88.  Liver panel was   normal.  The ultrasound, I have personally reviewed, it shows a normal left   testicle with small amount of fluid around it.  There is a normal right   testicle with good blood flow and there is a hematoma in the right hemiscrotum   with no obvious abscess, some thickening of the scrotal skin.    ASSESSMENT:  Recurrent right hydrocele after surgery, status post aspiration   with localized cellulitis.    PLAN AND RECOMMENDATIONS:  I explained to the patient the rationale for the IV   antibiotics.  He is at risk to develop an abscess in the right hemiscrotum   after drainage given the cellulitis, but he reports that with the antibiotics   last night in the emergency room the vancomycin that the cellulitis he had in   the prepubic area has resolved, his cellulitis is really confined to the right   hemiscrotum and I think it is worse than normal as he also probably has a   component of lymphatic outlet obstruction from the hernia on the left side and   the hydrocele both being done at the same time.  He is not in any distress or   pain.  He is aware that a care plan of continued intravenous antibiotics will   be undertaken and if there is any change in clinical condition, he could   require  operative exploration of the right hemiscrotum.  I explained to him   that if things improve hopefully within the next 24-72 hours, he would be able   to be discharged on oral antibiotics.  He appreciated this information.    Urology will continue to follow along in the care of this patient.       ____________________________________     MD MIKAYLA SOLANO / SHAKILA    DD:  03/30/2018 08:53:34  DT:  03/30/2018 11:20:56    D#:  [id number]  Job#:  [id number]    cc: ALMA PINEDA MD, Jace Frankel MD

## 2018-03-30 NOTE — DISCHARGE PLANNING
Care Transition Team Assessment    Patient lives at home with his spouse and the discharge plan is for him to return home when medically able.  No current SS needs noted.     Information Source  Orientation : Oriented x 4  Information Given By: Patient  Informant's Name: Chaparro  Who is responsible for making decisions for patient? : Patient    Readmission Evaluation  Is this a readmission?: No    Elopement Risk  Legal Hold: No  Ambulatory or Self Mobile in Wheelchair: Yes  Disoriented: No  Psychiatric Symptoms: None  History of Wandering: No  Elopement this Admit: No  Vocalizing Wanting to Leave: No  Displays Behaviors, Body Language Wanting to Leave: No-Not at Risk for Elopement  Elopement Risk: Not at Risk for Elopement    Interdisciplinary Discharge Planning  Does Admitting Nurse Feel This Could be a Complex Discharge?: No  Primary Care Physician: Dr. Barry   Patient or legal guardian wants to designate a caregiver (see row info): No  Do You Take your Prescribed Medications Regularly: Yes  Able to Return to Previous ADL's: Yes  Mobility Issues: No  Prior Services: None  Patient Expects to be Discharged to:: Home   Assistance Needed: No  Durable Medical Equipment: Not Applicable    Discharge Preparedness  What is your plan after discharge?: Home with help  What are your discharge supports?: Spouse  Prior Functional Level: Independent with Activities of Daily Living    Functional Assesment  Prior Functional Level: Independent with Activities of Daily Living    Finances  Financial Barriers to Discharge: No  Prescription Coverage: Yes    Vision / Hearing Impairment  Vision Impairment : Yes  Right Eye Vision: Impaired, Wears Glasses (for reading )  Left Eye Vision: Impaired, Wears Glasses (for reading )  Hearing Impairment : No    Advance Directive  Advance Directive?: None    Domestic Abuse  Have you ever been the victim of abuse or violence?: No    Psychological Assessment  History of Substance Abuse: None  History  of Psychiatric Problems: No    Discharge Risks or Barriers  Discharge risks or barriers?: No    Anticipated Discharge Information  Anticipated discharge disposition: Home

## 2018-03-30 NOTE — H&P
Hospital Medicine History and Physical    Date of Service  3/29/2018    Chief Complaint  Chief Complaint   Patient presents with   • Post-Op Complications     Had procedure on right testicle a month ago, had post-op appt yesteray where testicle was drained. Pt reports that today he has had increased swelling and pain in right testicle, was seen again today by Dr Almaraz at [facility] who sent him here for infection and an ultrasound       History of Presenting Illness  63 y.o. male who presented 3/29/2018 with worsening testicular swelling. Patient's has a history of a right hydrocele that was drained one month ago by Dr. arora with urology. Patient reports that since then he's had some mild swelling however it significantly got worse yesterday. He went to his urology evaluation and was seen by his urologist to drained bloody fluid from his right testicle. He states that he was doing okay but woke up today and developed worsening swelling. He went to see his urologist and saw Dr. Almaraz who recommended the patient get an ultrasound and sent him to the ER for concern of possible infection. Patient has been on Bactrim for 2 days. Patient otherwise denies urinary complaints, does report chills and subjective fevers.   Primary Care Physician  Marylin Barry M.D.    Consultants  Urology    Code Status  Full     Review of Systems  Review of Systems   Constitutional: Positive for chills and fever.   HENT: Negative for congestion.    Eyes: Negative for blurred vision.   Respiratory: Negative for cough, hemoptysis, sputum production and shortness of breath.    Cardiovascular: Negative for chest pain, palpitations, orthopnea and claudication.   Gastrointestinal: Negative for abdominal pain, diarrhea, heartburn, nausea and vomiting.   Genitourinary: Negative for dysuria, hematuria and urgency.        Erythema over suprapubic area and scrotum with significant scrotal swelling and tenderness   Musculoskeletal: Negative  for myalgias and neck pain.   Skin: Negative for rash.   Neurological: Negative for dizziness and headaches.   Psychiatric/Behavioral: Negative for depression.        Past Medical History  Past Medical History:   Diagnosis Date   • Hyperlipidemia    • Hypertension        Surgical History  Past Surgical History:   Procedure Laterality Date   • HERNIA REPAIR         Medications  No current facility-administered medications on file prior to encounter.      Current Outpatient Prescriptions on File Prior to Encounter   Medication Sig Dispense Refill   • losartan (COZAAR) 100 MG Tab Take 1 Tab by mouth every day. 90 Tab 0       Family History  Family History   Problem Relation Age of Onset   • Arthritis Mother    • Stroke Mother    • Hypertension Father        Social History  Social History   Substance Use Topics   • Smoking status: Former Smoker     Packs/day: 1.00     Years: 20.00     Types: Cigarettes     Quit date: 1989   • Smokeless tobacco: Never Used   • Alcohol use 3.6 oz/week     3 Glasses of wine, 3 Shots of liquor per week       Allergies  No Known Allergies     Physical Exam  Laboratory   Hemodynamics  Temp (24hrs), Av.1 °C (100.5 °F), Min:37.9 °C (100.2 °F), Max:38.2 °C (100.8 °F)   Temperature: (!) 38.2 °C (100.8 °F)  Pulse  Av.2  Min: 76  Max: 77    Blood Pressure : 126/67, NIBP: 122/66      Respiratory      Respiration: 18, Pulse Oximetry: 93 %             Physical Exam   Constitutional: He is oriented to person, place, and time. He appears well-developed and well-nourished.   HENT:   Head: Normocephalic and atraumatic.   Eyes: Pupils are equal, round, and reactive to light.   Neck: No JVD present.   Cardiovascular: Normal rate, regular rhythm and normal heart sounds.    Pulmonary/Chest: Effort normal. No respiratory distress. He has no wheezes. He has no rales.   Abdominal: Soft. He exhibits no distension and no mass. There is no tenderness.   Genitourinary:   Genitourinary Comments:  Suprapubic erythema and erythema around scrotum with significant scrotal swelling and tenderness.   Musculoskeletal: Normal range of motion. He exhibits no edema.   Neurological: He is alert and oriented to person, place, and time.   Skin: Skin is warm.       Recent Labs      03/29/18 2035   WBC  14.3*   RBC  4.27*   HEMOGLOBIN  13.3*   HEMATOCRIT  37.9*   MCV  88.8   MCH  31.1   MCHC  35.1   RDW  40.7   PLATELETCT  181   MPV  11.5     Recent Labs      03/29/18 2035   SODIUM  130*   POTASSIUM  3.7   CHLORIDE  101   CO2  23   GLUCOSE  88   BUN  19   CREATININE  1.10   CALCIUM  8.2*     Recent Labs      03/29/18 2035   ALTSGPT  15   ASTSGOT  17   ALKPHOSPHAT  54   TBILIRUBIN  0.5   GLUCOSE  88                 No results found for: TROPONINI  Urinalysis:    Lab Results  Component Value Date/Time   SPECGRAVITY 1.020 03/29/2018 2140   GLUCOSEUR Negative 03/29/2018 2140   KETONES 15 (A) 03/29/2018 2140   NITRITE Negative 03/29/2018 2140        Imaging    Ultrasound scrotum   Narrative       3/29/2018 8:45 PM    HISTORY/REASON FOR EXAM: Right hemiscrotum pain and swelling. Hydrocele drainage one month and 1 day ago .    TECHNIQUE/EXAM DESCRIPTION:  Real-time sonography of the scrotum was performed with gray-scale, color and duplex Doppler imaging.    COMPARISON: None    FINDINGS:  Large complicated fluid collection fills the right hemiscrotum measuring 5 cm short axis with extensive septations but no nodularity or internal vascular flow. No adjacent hyperemia    Small left hydrocele has mild internal echoes. No septations    The testes are homogeneous in echotexture and low-resistive waveforms are confirmed bilaterally but the right waveform is slightly delayed in upstroke. No intratesticular mass is seen    The right testis measures 4.03 cm x 1.87 cm x 2.69 cm.    The left testis measures  5.55 cm x 2.70 cm x 3.39 cm. It contains a 6 mm simple cyst.    There is no epididymal enlargement. The right epididymis is not  distinctly seen    No varicocele is detected.    There is some inguinal edema and skin thickening on the right with mildly prominent nodes measuring up to 21 x 7 mm   Impression       Large complex right hydrocele most likely is hemorrhagic. No hyperemia to indicate superinfection although clinical correlation is advised    Small mildly complicated left hydrocele without evidence of superinfection    Vascular flow is confirmed to bilateral testes with low resistive waveforms. There is some asymmetry with mildly damped waveform on the right. Torsion will generally result in the opposite, a highly resistive waveform. No findings suspicious for left   orchitis    Simple left intratesticular cyst is of no significance   Reading Provider Reading Date   Mac Shine M.D. Mar 29, 2018          Assessment/Plan     I anticipate this patient will require more than 2 midnights for appropriate medical management.    Cellulitis- (present on admission)   Assessment & Plan    Scrotal cellulitis with possible underlying infected hydrocele. Patient does have a leukocytosis and a mild fever. We'll start the patient on IV antibiotics and await urology evaluation for possible drainage.        Hydrocele- (present on admission)   Assessment & Plan    Ultrasound shows evidence of a right large complex hydrocele and a smaller left hydrocele. Again will await urology evaluation for possible drainage.        Leukocytosis- (present on admission)   Assessment & Plan    In the setting of cellulitis. We'll continue IV antibiotics and repeat CBC in the morning.        Essential (primary) hypertension- (present on admission)   Assessment & Plan    Will continue patient's home medication and monitor.            VTE prophylaxis: SCDs

## 2018-03-30 NOTE — PROGRESS NOTES
"Pharmacy Kinetics 63 y.o. male on vancomycin day # 2 3/30/2018    Currently on Vancomycin Loaded last night with 1700 mg    Indication for Treatment: Scrotal Cellulitis    Pertinent history per medical record: Admitted on 3/29/2018 for None.    Other antibiotics: Zosyn 3.375 mg q 12 h    Allergies: Patient has no known allergies.     List concerns for renal function: Age of 63, Vanco/Zosyn combo    Pertinent cultures to date:   3/29 BCX2 Neg    Recent Labs      18   0414   WBC  14.3*  10.9*   NEUTSPOLYS  69.90  66.90     Recent Labs      18   0414   BUN  19  13   CREATININE  1.10  0.97   ALBUMIN  3.3   --      No results for input(s): VANCOTROUGH, VANCOPEAK, VANCORANDOM in the last 72 hours.  Intake/Output Summary (Last 24 hours) at 18 0857  Last data filed at 18 0700   Gross per 24 hour   Intake             1300 ml   Output             1250 ml   Net               50 ml      Blood pressure 113/52, pulse 68, temperature 37.2 °C (98.9 °F), resp. rate 17, height 1.626 m (5' 4\"), weight 69.5 kg (153 lb 3.5 oz), SpO2 94 %. Temp (24hrs), Av.6 °C (99.7 °F), Min:37.1 °C (98.7 °F), Max:38.2 °C (100.8 °F)      A/P   1. Vancomycin dose change: 1 GM q 12 h  2. Next vancomycin level: 3/31 09:30  3. Goal trough: 12-16  4. Comments: Will continue to monitor and adjust dose as needed per protocol.    Tran Curran    "

## 2018-03-30 NOTE — PROGRESS NOTES
2 RN skin assessment done; skin not WDL. See Wound flowsheet. Pt had edematous testicle & redness radiating to pubic region. Skin remains intact otherwise

## 2018-03-30 NOTE — CARE PLAN
Problem: Pain Management  Goal: Pain level will decrease to patient's comfort goal  Outcome: PROGRESSING AS EXPECTED  Pt denied pain at this moment   Encouraged pt to report to the nurse if experiencing pain, pt verbalized understanding     Problem: Infection  Goal: Will remain free from infection  Outcome: PROGRESSING AS EXPECTED  Pt is afebrile (98.7*F), recent WBCs 10.9  Pt had x1 Vanco 1700mg and on scheduled Zosyn 3.375g q8 hours. Continue to monitor

## 2018-03-30 NOTE — CONSULTS
INFECTIOUS DISEASES INPATIENT CONSULT NOTE     Date of Service: 3/30/2018    Consult Requested By: Darryl Ashraf D.O.    Reason for Consultation: Scrotal cellulitis    History of Present Illness:   Chaparro Rosenberg is a 63 y.o. Man with a history of HTN, HLD and right hydrocele admitted 3/29/2018 for increasing swelling and erythema of his right testicle. Pt states he underwent right hydrocele surgery with [facility] (Dr. Frankel) and left hernia repair on 2/26/18. Pt states his testicular swelling never really improved after surgery. He followed up with his surgeon regarding his hernia repair about 2 weeks ago and was doing well from his standpoint. On Wednesday, he had his post op appointment with urology on Wednesday and his right testicle was noted be swollen and blood tinged fluid was drained. He was sent home with Bactrim which he took for Wednesday and Thursday prior to admission however he did not note any improvement. He noted increasing erythema but no worsening pain. He saw urology again earlier on the day of admission due to his concerns as he was going to be traveling for two weeks. He saw Dr. Almaraz and given concern for infection with surrounding erythema, he recommended ED evaluation for IV abx. Pt states he did have subjective fevers and chills the past few days but no nausea, vomiting or abdominal pain. No dysuria. On presentation, he was febrile to 100.8 with a leukocytosis of 14.3. Scrotal US revealed a large complex right hydrocele most likely is hemorrhagic. Pt is on broad spectrum abx. Blood cultures are negative to date. ID consulted for abx recommendations.    Review Of Systems:  All other ROS were reviewed and are negative except as noted above in the HPI.    PMH:   Past Medical History:   Diagnosis Date   • Hyperlipidemia    • Hypertension        PSH:  Past Surgical History:   Procedure Laterality Date   • HERNIA REPAIR     Bilateral hernia repair    FAMILY HX:  Family History   Problem  Relation Age of Onset   • Arthritis Mother    • Stroke Mother    • Hypertension Father        SOCIAL HX:  Social History     Social History   • Marital status:      Spouse name: N/A   • Number of children: N/A   • Years of education: N/A     Occupational History   • Not on file.     Social History Main Topics   • Smoking status: Former Smoker     Packs/day: 1.00     Years: 20.00     Types: Cigarettes     Quit date: 4/11/1989   • Smokeless tobacco: Never Used   • Alcohol use 3.6 oz/week     3 Glasses of wine, 3 Shots of liquor per week   • Drug use: No   • Sexual activity: Yes     Partners: Female     Birth control/ protection: Post-Menopausal     Other Topics Concern   • Not on file     Social History Narrative   • No narrative on file     History   Smoking Status   • Former Smoker   • Packs/day: 1.00   • Years: 20.00   • Types: Cigarettes   • Quit date: 4/11/1989   Smokeless Tobacco   • Never Used     History   Alcohol Use   • 3.6 oz/week   • 3 Glasses of wine, 3 Shots of liquor per week       Allergies/Intolerances:  No Known Allergies    History reviewed with the patient    Other Current Medications:    Current Facility-Administered Medications:   •  vancomycin 1,000 mg in  mL IVPB, 15 mg/kg, Intravenous, Q12HR, Darryl Ashraf D.O.  •  losartan (COZAAR) tablet 100 mg, 100 mg, Oral, DAILY, Bettie Antonio M.D., 100 mg at 03/30/18 0850  •  senna-docusate (PERICOLACE or SENOKOT S) 8.6-50 MG per tablet 2 Tab, 2 Tab, Oral, BID, Stopped at 03/30/18 0900 **AND** polyethylene glycol/lytes (MIRALAX) PACKET 1 Packet, 1 Packet, Oral, QDAY PRN **AND** magnesium hydroxide (MILK OF MAGNESIA) suspension 30 mL, 30 mL, Oral, QDAY PRN **AND** bisacodyl (DULCOLAX) suppository 10 mg, 10 mg, Rectal, QDAY PRN, Bettie Antonio M.D.  •  MD ALERT... vancomycin per pharmacy protocol, , Other, pharmacy to dose, Bettie Antonio M.D.  •  ondansetron (ZOFRAN) syringe/vial injection 4 mg, 4 mg, Intravenous, Q4HRS PRN, Bettie  "DEMARCUS Antonio  •  acetaminophen (TYLENOL) tablet 650 mg, 650 mg, Oral, Q6HRS PRN, Bettie Antonio M.D.  •  ibuprofen (MOTRIN) tablet 400 mg, 400 mg, Oral, Q6HRS PRN, Bettie Antonio M.D.  •  [COMPLETED] piperacillin-tazobactam (ZOSYN) 3.375 g in  mL IVPB, 3.375 g, Intravenous, Once, Stopped at 18 **AND** piperacillin-tazobactam (ZOSYN) 3.375 g in  mL IVPB, 3.375 g, Intravenous, Q8HRS, Bettie Antonio M.D., Last Rate: 25 mL/hr at 18, 3.375 g at 18  [unfilled]    Most Recent Vital Signs:  /52   Pulse 68   Temp 37.2 °C (98.9 °F)   Resp 17   Ht 1.626 m (5' 4\")   Wt 69.5 kg (153 lb 3.5 oz)   SpO2 94%   BMI 26.30 kg/m²   Temp  Av.6 °C (99.7 °F)  Min: 37.1 °C (98.7 °F)  Max: 38.2 °C (100.8 °F)    Physical Exam:  General: well-appearing, no acute distress  HEENT: sclera anicteric, PERRL, EOMI, MMM, no oral lesions  Neck: supple, no lymphadenopathy  Chest: CTAB, no r/r/w, normal work of breathing.  Cardiac: RRR, normal S1 S2, no m/r/g   Abdomen: + bowel sounds, soft, non-tender, non-distended, no HSM  : scrotal edema and erythema, +tender to palpation  Extremities: WWP, no edema, 2+ pulses  Skin: no rashes or worrisome lesions  Neuro: Alert and oriented times 3, non-focal exam, speech fluent, moves all extremities    Pertinent Lab Results:  Recent Labs      18   WBC  14.3*  10.9*      Recent Labs      18   HEMOGLOBIN  13.3*  12.5*   HEMATOCRIT  37.9*  36.0*   MCV  88.8  88.9   MCH  31.1  30.9   PLATELETCT  181  167         Recent Labs      18   0414   SODIUM  130*  134*   POTASSIUM  3.7  3.3*   CHLORIDE  101  106   CO2  23  23   CREATININE  1.10  0.97        Recent Labs      18   ALBUMIN  3.3        Pertinent Micro:  Results     Procedure Component Value Units Date/Time    BLOOD CULTURE [[id number]] Collected:  18    Order Status:  Completed " "Specimen:  Blood from Peripheral Updated:  03/30/18 0715     Significant Indicator NEG     Source BLD     Site PERIPHERAL     Blood Culture No Growth    Note: Blood cultures are incubated for 5 days and  are monitored continuously.Positive blood cultures  are called to the RN and reported as soon as  they are identified.    Blood culture testing and Gram stain, if indicated, are  performed at [facility], [address] [place] [address].  Positive blood cultures are  sent to [facility], [address] [place] [address], for organism identification and  susceptibility testing.      Narrative:       1 of 2 for Blood Culture x 2 sites order. Per Hospital  Policy: Only change Specimen Src: to \"Line\" if specified by  physician order.    BLOOD CULTURE [[id number]] Collected:  03/29/18 2042    Order Status:  Completed Specimen:  Blood from Peripheral Updated:  03/30/18 0715     Significant Indicator NEG     Source BLD     Site PERIPHERAL     Blood Culture No Growth    Note: Blood cultures are incubated for 5 days and  are monitored continuously.Positive blood cultures  are called to the RN and reported as soon as  they are identified.    Blood culture testing and Gram stain, if indicated, are  performed at [facility], [address] [place] [address].  Positive blood cultures are  sent to [facility], [address] [place] [address], for organism identification and  susceptibility testing.      Narrative:       2 of 2 blood culture x2  Sites order. Per Hospital Policy:  Only change Specimen Src: to \"Line\" if specified by physician  order.    URINALYSIS [[id number]]  (Abnormal) Collected:  03/29/18 2140    Order Status:  Completed Specimen:  Urine Updated:  03/29/18 2143     Color Yellow     Character Clear     Specific Gravity 1.020     Ph 5.5     Glucose Negative mg/dL      Ketones 15 (A) mg/dL      Protein Negative mg/dL      " Bilirubin Negative     Nitrite Negative     Leukocyte Esterase Negative     Occult Blood Negative     Micro Urine Req see below     Comment: Microscopic examination not performed when specimen is clear  and chemically negative for protein, blood, leukocyte esterase  and nitrite.         Narrative:       Indication for culture:->Emergency Room Patient    URINE CULTURE(NEW) [[id number]] Collected:  03/29/18 2140    Order Status:  Completed Specimen:  Urine Updated:  03/29/18 2141    Narrative:       Indication for culture:->Emergency Room Patient        Blood Culture   Date Value Ref Range Status   03/29/2018   Preliminary    No Growth    Note: Blood cultures are incubated for 5 days and  are monitored continuously.Positive blood cultures  are called to the RN and reported as soon as  they are identified.    Blood culture testing and Gram stain, if indicated, are  performed at [address].  Positive blood cultures are  sent to [facility], [address] [place] [address], for organism identification and  susceptibility testing.          Studies:  [id number]    Result Date: 3/29/2018  3/29/2018 8:45 PM HISTORY/REASON FOR EXAM: Right hemiscrotum pain and swelling. Hydrocele drainage one month and 1 day ago . TECHNIQUE/EXAM DESCRIPTION: Real-time sonography of the scrotum was performed with gray-scale, color and duplex Doppler imaging. COMPARISON: None FINDINGS: Large complicated fluid collection fills the right hemiscrotum measuring 5 cm short axis with extensive septations but no nodularity or internal vascular flow. No adjacent hyperemia Small left hydrocele has mild internal echoes. No septations The testes are homogeneous in echotexture and low-resistive waveforms are confirmed bilaterally but the right waveform is slightly delayed in upstroke. No intratesticular mass is seen The right testis measures 4.03 cm x 1.87 cm x 2.69  cm. The left testis measures  5.55 cm x 2.70 cm x 3.39 cm. It contains a 6 mm simple cyst. There is no epididymal enlargement. The right epididymis is not distinctly seen No varicocele is detected. There is some inguinal edema and skin thickening on the right with mildly prominent nodes measuring up to 21 x 7 mm     Large complex right hydrocele most likely is hemorrhagic. No hyperemia to indicate superinfection although clinical correlation is advised Small mildly complicated left hydrocele without evidence of superinfection Vascular flow is confirmed to bilateral testes with low resistive waveforms. There is some asymmetry with mildly damped waveform on the right. Torsion will generally result in the opposite, a highly resistive waveform. No findings suspicious for left orchitis Simple left intratesticular cyst is of no significance      IMPRESSION:   1. Scrotal cellulitis    2. Leukocytosis  3. Recent right hydrocele surgery      PLAN:   Chaparro Rosenberg is a 63 y.o. man with a history of HTN and HLD who recently underwent right hydrocele surgery and left hernia repair simultaneously on 2/26/18 admitted for increasing swelling and erythema of his right scrotum and failure of outpatient abx. Clinical presentation consistent with scrotal cellulitis. Blood cultures are negative to date. Will DC vancomycin and transition to PO doxy. Continue zosyn. Recommend scrotal elevation. Appears to be improving with decreased erythema; however if worsens over the next 24 hours, recommend repeat US and drainage if feasible. Further recommendations to follow per clinical course.      Plan of care discussed with SETH Ashraf D.O.. Will continue to follow    Tawnya Tyson M.D.

## 2018-03-30 NOTE — PROGRESS NOTES
Pt arrived via gurney, admitted to room 201-1 from ER at 2315. Pt is A&Ox4, denied pain, only c/o mild discomfort due to edematous testicle, denied pain med. IV-Vanco currently infusing at 167 ml/hr. Oriented to room call light and smoking policy. Reviewed plan of care with the patient and the family. Fall precaution in place. Bed locked & at lowest position. Call light within reach. Encouraged pt the importance to call for assistance, encouraged pt to call for any assistance. Continue to monitor.

## 2018-03-30 NOTE — ASSESSMENT & PLAN NOTE
Scrotal cellulitis/abscess. S/p bedside I&D.  Fluid collected and sent for culture and growing MRSA.    Continue linezolid stop date 4/15  Will set up HH with wound care for dressing changes every other day as recommended by urology

## 2018-03-30 NOTE — CARE PLAN
Problem: Communication  Goal: The ability to communicate needs accurately and effectively will improve  Outcome: PROGRESSING AS EXPECTED  Pt communicates effectively with staff.

## 2018-03-30 NOTE — CARE PLAN
Problem: Safety  Goal: Will remain free from falls  Outcome: PROGRESSING AS EXPECTED  Pt steady on his feet

## 2018-03-30 NOTE — ED PROVIDER NOTES
ED Provider Note    CHIEF COMPLAINT  Chief Complaint   Patient presents with   • Post-Op Complications     Had procedure on right testicle a month ago, had post-op appt yesteray where testicle was drained. Pt reports that today he has had increased swelling and pain in right testicle, was seen again today by Dr Almaraz at [facility] who sent him here for infection and an ultrasound       HPI  Chaparro Rosenberg is a 63 y.o. male who presents with testicle swelling and pain redness extending up into the lower abdomen. The patient had a sensory procedure the right testicle a month ago postoperatively he developed some residual swelling and fluid collection which was drained by the urologist yesterday. Following that the patient again developed increasing redness swelling with redness extending up to the abdomen this afternoon he was seen again at the urologist and was referred to the ER. Has had some increased pain since fever this evening.    REVIEW OF SYSTEMS  See HPI for further details. Fever chills All other systems are negative.    PAST MEDICAL HISTORY  Past Medical History:   Diagnosis Date   • Hyperlipidemia    • Hypertension        FAMILY HISTORY  Family History   Problem Relation Age of Onset   • Arthritis Mother    • Stroke Mother    • Hypertension Father        SOCIAL HISTORY  Social History     Social History   • Marital status:      Spouse name: N/A   • Number of children: N/A   • Years of education: N/A     Social History Main Topics   • Smoking status: Former Smoker     Packs/day: 1.00     Years: 20.00     Types: Cigarettes     Quit date: 4/11/1989   • Smokeless tobacco: Never Used   • Alcohol use 3.6 oz/week     3 Glasses of wine, 3 Shots of liquor per week   • Drug use: No   • Sexual activity: Yes     Partners: Female     Birth control/ protection: Post-Menopausal     Other Topics Concern   • Not on file     Social History Narrative   • No narrative on file       SURGICAL HISTORY  Past  "Surgical History:   Procedure Laterality Date   • HERNIA REPAIR         CURRENT MEDICATIONS  Home Medications     Reviewed by Jo Mayo R.N. (Registered Nurse) on 03/29/18 at 1952  Med List Status: Complete   Medication Last Dose Status   losartan (COZAAR) 100 MG Tab 3/29/2018 Active   sulfamethoxazole-trimethoprim (BACTRIM DS) 800-160 MG tablet 3/29/2018 Active                ALLERGIES  No Known Allergies    PHYSICAL EXAM  VITAL SIGNS: /67   Pulse 77   Temp 37.9 °C (100.2 °F)   Resp 18   Ht 1.626 m (5' 4\")   Wt 69.5 kg (153 lb 3.5 oz)   SpO2 97%   BMI 26.30 kg/m²     Constitutional: Well developed, Well nourished, No acute distress, Non-toxic appearance.   Psychiatric:  Normal psychiatric exam, Normal affect, Normal judgement, Normal mood, No suicidal or homocidal ideation.able to give a good history.  HENT: Normocephalic, Atraumatic, Bilateral external ears normal, mucous membranes moist, No oral exudates, Nose normal.   Eyes: PERRLA, EOMI, Conjunctiva and lids normal, No discharge.   Neck: Normal range of motion, No tenderness, Supple, No stridor.   Lymphatic: No cervical or axillary lymphadenopathy noted.   Cardiovascular: Normal heart rate, Normal rhythm, No murmurs,  , No gallops.   Thorax & Lungs: Normal breath sounds, No respiratory distress, No wheezing, or other abnormal breath sounds. No chest tenderness.   Abdomen: Bowel sounds normal, Soft, No tenderness, No masses, No pulsatile masses. No guarding.  : There is significant swelling of the scrotum with redness and tenderness extends slightly up onto the abdominal wall as well. There does not appear to be in the necrotic area there is no drainage there is no open wound  Skin: Warm, Dry, .   Back: No tenderness, No CVA tenderness.   Extremities: Intact distal pulses, No edema, No tenderness, No cyanosis, No clubbing.   Musculoskeletal: Good range of motion in all major joints. No tenderness to palpation or major deformities, or " injuries noted.   Neurologic: Alert & oriented x 3, Normal motor function, Normal sensory function, No focal deficits noted.        [id number]   Final Result      Large complex right hydrocele most likely is hemorrhagic. No hyperemia to indicate superinfection although clinical correlation is advised      Small mildly complicated left hydrocele without evidence of superinfection      Vascular flow is confirmed to bilateral testes with low resistive waveforms. There is some asymmetry with mildly damped waveform on the right. Torsion will generally result in the opposite, a highly resistive waveform. No findings suspicious for left    orchitis      Simple left intratesticular cyst is of no significance        Results for orders placed or performed during the hospital encounter of 03/29/18   CBC WITH DIFFERENTIAL   Result Value Ref Range    WBC 14.3 (H) 4.8 - 10.8 K/uL    RBC 4.27 (L) 4.70 - 6.10 M/uL    Hemoglobin 13.3 (L) 14.0 - 18.0 g/dL    Hematocrit 37.9 (L) 42.0 - 52.0 %    MCV 88.8 81.4 - 97.8 fL    MCH 31.1 27.0 - 33.0 pg    MCHC 35.1 33.7 - 35.3 g/dL    RDW 40.7 35.9 - 50.0 fL    Platelet Count 181 164 - 446 K/uL    MPV 11.5 9.0 - 12.9 fL    Neutrophils-Polys 69.90 44.00 - 72.00 %    Lymphocytes 21.00 (L) 22.00 - 41.00 %    Monocytes 8.40 0.00 - 13.40 %    Eosinophils 0.10 0.00 - 6.90 %    Basophils 0.20 0.00 - 1.80 %    Immature Granulocytes 0.40 0.00 - 0.90 %    Nucleated RBC 0.00 /100 WBC    Neutrophils (Absolute) 10.01 (H) 1.82 - 7.42 K/uL    Lymphs (Absolute) 3.01 1.00 - 4.80 K/uL    Monos (Absolute) 1.20 (H) 0.00 - 0.85 K/uL    Eos (Absolute) 0.01 0.00 - 0.51 K/uL    Baso (Absolute) 0.03 0.00 - 0.12 K/uL    Immature Granulocytes (abs) 0.06 0.00 - 0.11 K/uL    NRBC (Absolute) 0.00 K/uL   COMP METABOLIC PANEL   Result Value Ref Range    Sodium 130 (L) 135 - 145 mmol/L    Potassium 3.7 3.6 - 5.5 mmol/L    Chloride 101 96 - 112 mmol/L    Co2 23 20 - 33 mmol/L    Anion Gap 6.0 0.0 - 11.9    Glucose  88 65 - 99 mg/dL    Bun 19 8 - 22 mg/dL    Creatinine 1.10 0.50 - 1.40 mg/dL    Calcium 8.2 (L) 8.4 - 10.2 mg/dL    AST(SGOT) 17 12 - 45 U/L    ALT(SGPT) 15 2 - 50 U/L    Alkaline Phosphatase 54 30 - 99 U/L    Total Bilirubin 0.5 0.1 - 1.5 mg/dL    Albumin 3.3 3.2 - 4.9 g/dL    Total Protein 6.0 6.0 - 8.2 g/dL    Globulin 2.7 1.9 - 3.5 g/dL    A-G Ratio 1.2 g/dL   URINALYSIS   Result Value Ref Range    Color Yellow     Character Clear     Specific Gravity 1.020 <1.035    Ph 5.5 5.0 - 8.0    Glucose Negative Negative mg/dL    Ketones 15 (A) Negative mg/dL    Protein Negative Negative mg/dL    Bilirubin Negative Negative    Nitrite Negative Negative    Leukocyte Esterase Negative Negative    Occult Blood Negative Negative    Micro Urine Req see below    LACTIC ACID   Result Value Ref Range    Lactic Acid 1.17 0.50 - 2.00 mmol/L   ESTIMATED GFR   Result Value Ref Range    GFR If African American >60 >60 mL/min/1.73 m 2    GFR If Non African American >60 >60 mL/min/1.73 m 2            COURSE & MEDICAL DECISION MAKING  Pertinent Labs & Imaging studies reviewed. (See chart for details)  Patient was apparently presenting with apparent scrotal cellulitis or inflammation. He has quite a bit of fluid in the scrotum apparently this was drained yesterday and was serosanguineous, apparently appears most likely due to hemorrhagic on ultrasound. This is discussed with urologist who will be following the patient. He is asked the patient be kept nothing by mouth and IV antibiotics which are partly started here in the emergency room. Patient may ultimately require surgical exploration or debridement will be admitted tonight discussed with the hospitalist    FINAL IMPRESSION  1. Scrotal cellulitis  2.   3.       Electronically signed by: Alec Olson, 3/29/2018 8:08 PM

## 2018-03-30 NOTE — ED NOTES
"Chief Complaint   Patient presents with   • Post-Op Complications     Had procedure on right testicle a month ago, had post-op appt yesteray where testicle was drained. Pt reports that today he has had increased swelling and pain in right testicle, was seen again today by Dr Almaraz at [facility] who sent him here for infection and an ultrasound     Pulse 77   Temp 37.9 °C (100.2 °F)   Resp 18   Ht 1.626 m (5' 4\")   Wt 69.5 kg (153 lb 3.5 oz)   SpO2 97%   BMI 26.30 kg/m²     "

## 2018-03-31 ENCOUNTER — APPOINTMENT (OUTPATIENT)
Dept: RADIOLOGY | Facility: MEDICAL CENTER | Age: 63
DRG: 863 | End: 2018-03-31
Attending: NURSE PRACTITIONER
Payer: MEDICARE

## 2018-03-31 LAB
ALBUMIN SERPL BCP-MCNC: 2.8 G/DL (ref 3.2–4.9)
BASOPHILS # BLD AUTO: 0.2 % (ref 0–1.8)
BASOPHILS # BLD: 0.03 K/UL (ref 0–0.12)
BUN SERPL-MCNC: 14 MG/DL (ref 8–22)
CALCIUM SERPL-MCNC: 8.5 MG/DL (ref 8.4–10.2)
CHLORIDE SERPL-SCNC: 103 MMOL/L (ref 96–112)
CO2 SERPL-SCNC: 23 MMOL/L (ref 20–33)
CREAT SERPL-MCNC: 0.94 MG/DL (ref 0.5–1.4)
EOSINOPHIL # BLD AUTO: 0.02 K/UL (ref 0–0.51)
EOSINOPHIL NFR BLD: 0.2 % (ref 0–6.9)
ERYTHROCYTE [DISTWIDTH] IN BLOOD BY AUTOMATED COUNT: 40.6 FL (ref 35.9–50)
GLUCOSE SERPL-MCNC: 107 MG/DL (ref 65–99)
HCT VFR BLD AUTO: 39.8 % (ref 42–52)
HGB BLD-MCNC: 13.8 G/DL (ref 14–18)
IMM GRANULOCYTES # BLD AUTO: 0.17 K/UL (ref 0–0.11)
IMM GRANULOCYTES NFR BLD AUTO: 1.4 % (ref 0–0.9)
LYMPHOCYTES # BLD AUTO: 2.5 K/UL (ref 1–4.8)
LYMPHOCYTES NFR BLD: 20.2 % (ref 22–41)
MAGNESIUM SERPL-MCNC: 2 MG/DL (ref 1.5–2.5)
MCH RBC QN AUTO: 31 PG (ref 27–33)
MCHC RBC AUTO-ENTMCNC: 34.7 G/DL (ref 33.7–35.3)
MCV RBC AUTO: 89.4 FL (ref 81.4–97.8)
MONOCYTES # BLD AUTO: 1.06 K/UL (ref 0–0.85)
MONOCYTES NFR BLD AUTO: 8.6 % (ref 0–13.4)
NEUTROPHILS # BLD AUTO: 8.57 K/UL (ref 1.82–7.42)
NEUTROPHILS NFR BLD: 69.4 % (ref 44–72)
NRBC # BLD AUTO: 0 K/UL
NRBC BLD-RTO: 0 /100 WBC
PHOSPHATE SERPL-MCNC: 3.4 MG/DL (ref 2.5–4.5)
PLATELET # BLD AUTO: 201 K/UL (ref 164–446)
PMV BLD AUTO: 11.4 FL (ref 9–12.9)
POTASSIUM SERPL-SCNC: 3.8 MMOL/L (ref 3.6–5.5)
RBC # BLD AUTO: 4.45 M/UL (ref 4.7–6.1)
SODIUM SERPL-SCNC: 135 MMOL/L (ref 135–145)
WBC # BLD AUTO: 12.4 K/UL (ref 4.8–10.8)

## 2018-03-31 PROCEDURE — 700111 HCHG RX REV CODE 636 W/ 250 OVERRIDE (IP): Performed by: INTERNAL MEDICINE

## 2018-03-31 PROCEDURE — 99232 SBSQ HOSP IP/OBS MODERATE 35: CPT | Performed by: INTERNAL MEDICINE

## 2018-03-31 PROCEDURE — 700105 HCHG RX REV CODE 258: Performed by: STUDENT IN AN ORGANIZED HEALTH CARE EDUCATION/TRAINING PROGRAM

## 2018-03-31 PROCEDURE — 80069 RENAL FUNCTION PANEL: CPT

## 2018-03-31 PROCEDURE — 85025 COMPLETE CBC W/AUTO DIFF WBC: CPT

## 2018-03-31 PROCEDURE — 700102 HCHG RX REV CODE 250 W/ 637 OVERRIDE(OP): Performed by: STUDENT IN AN ORGANIZED HEALTH CARE EDUCATION/TRAINING PROGRAM

## 2018-03-31 PROCEDURE — 700102 HCHG RX REV CODE 250 W/ 637 OVERRIDE(OP): Performed by: INTERNAL MEDICINE

## 2018-03-31 PROCEDURE — 83735 ASSAY OF MAGNESIUM: CPT

## 2018-03-31 PROCEDURE — 700111 HCHG RX REV CODE 636 W/ 250 OVERRIDE (IP): Performed by: STUDENT IN AN ORGANIZED HEALTH CARE EDUCATION/TRAINING PROGRAM

## 2018-03-31 PROCEDURE — 770006 HCHG ROOM/CARE - MED/SURG/GYN SEMI*

## 2018-03-31 PROCEDURE — 700105 HCHG RX REV CODE 258: Performed by: INTERNAL MEDICINE

## 2018-03-31 PROCEDURE — A9270 NON-COVERED ITEM OR SERVICE: HCPCS | Performed by: INTERNAL MEDICINE

## 2018-03-31 PROCEDURE — A9270 NON-COVERED ITEM OR SERVICE: HCPCS | Performed by: STUDENT IN AN ORGANIZED HEALTH CARE EDUCATION/TRAINING PROGRAM

## 2018-03-31 PROCEDURE — 76870 US EXAM SCROTUM: CPT

## 2018-03-31 PROCEDURE — 36415 COLL VENOUS BLD VENIPUNCTURE: CPT

## 2018-03-31 RX ADMIN — IBUPROFEN 400 MG: 400 TABLET ORAL at 20:08

## 2018-03-31 RX ADMIN — DOXYCYCLINE 100 MG: 100 TABLET ORAL at 08:55

## 2018-03-31 RX ADMIN — PIPERACILLIN AND TAZOBACTAM 3.38 G: 3; .375 INJECTION, POWDER, FOR SOLUTION INTRAVENOUS at 21:44

## 2018-03-31 RX ADMIN — PIPERACILLIN AND TAZOBACTAM 3.38 G: 3; .375 INJECTION, POWDER, FOR SOLUTION INTRAVENOUS at 13:51

## 2018-03-31 RX ADMIN — PIPERACILLIN AND TAZOBACTAM 3.38 G: 3; .375 INJECTION, POWDER, FOR SOLUTION INTRAVENOUS at 05:38

## 2018-03-31 RX ADMIN — VANCOMYCIN HYDROCHLORIDE 1700 MG: 1 INJECTION, POWDER, LYOPHILIZED, FOR SOLUTION INTRAVENOUS at 11:32

## 2018-03-31 RX ADMIN — LOSARTAN POTASSIUM 100 MG: 25 TABLET, FILM COATED ORAL at 08:54

## 2018-03-31 ASSESSMENT — ENCOUNTER SYMPTOMS
SHORTNESS OF BREATH: 0
VOMITING: 0
ABDOMINAL PAIN: 0
NAUSEA: 0
FEVER: 0
DIZZINESS: 0
CHILLS: 0
BLURRED VISION: 0

## 2018-03-31 ASSESSMENT — PATIENT HEALTH QUESTIONNAIRE - PHQ9
1. LITTLE INTEREST OR PLEASURE IN DOING THINGS: NOT AT ALL
2. FEELING DOWN, DEPRESSED, IRRITABLE, OR HOPELESS: NOT AT ALL
SUM OF ALL RESPONSES TO PHQ9 QUESTIONS 1 AND 2: 0

## 2018-03-31 ASSESSMENT — PAIN SCALES - GENERAL
PAINLEVEL_OUTOF10: 1
PAINLEVEL_OUTOF10: 3

## 2018-03-31 NOTE — PROGRESS NOTES
"Pharmacy Kinetics 63 y.o. male on vancomycin day # 1 3/31/2018    Currently on Vancomycin Re-start/load with 1700 mg X 1 dose    Indication for Treatment: Scrotal Cellulitis    Pertinent history per medical record: Admitted on 3/29/2018 for Cellulitis.    Other antibiotics: Zosyn 3.375 G q8h    Allergies: Patient has no known allergies.     List concerns for renal function : Age of 63, Vanco/Zosyn combo    Pertinent cultures to date:   3/29 BCX2 Neg    Recent Labs      184  18   0411   WBC  14.3*  10.9*  12.4*   NEUTSPOLYS  69.90  66.90  69.40     Recent Labs      181   BUN  19  13  14   CREATININE  1.10  0.97  0.94   ALBUMIN  3.3   --   2.8*     No results for input(s): VANCOTROUGH, VANCOPEAK, VANCORANDOM in the last 72 hours.  Intake/Output Summary (Last 24 hours) at 18 1046  Last data filed at 18 0800   Gross per 24 hour   Intake             2320 ml   Output                0 ml   Net             2320 ml      Blood pressure 117/57, pulse 78, temperature 37.1 °C (98.8 °F), resp. rate 18, height 1.626 m (5' 4\"), weight 69.5 kg (153 lb 3.5 oz), SpO2 94 %. Temp (24hrs), Av.6 °C (99.6 °F), Min:37.1 °C (98.8 °F), Max:37.9 °C (100.2 °F)      A/P   1. Vancomycin dose change: 1 GM q12h  2. Next vancomycin level:  10:30  3. Goal trough: 12-16  4. Comments: Will continue to monitor and adjust dose as needed per protocol.    Tran Curran    "

## 2018-03-31 NOTE — PROGRESS NOTES
Pt seen by urology NP at bedside. Ultrasound results discussed with patient. No change noted on ultrasound, will continue to monitor drainage and notify of any purulent drainage or any spike in patient temperature.

## 2018-03-31 NOTE — PROGRESS NOTES
"Urology Progress Note      Overnight Events: None    S: No fevers, chills, nausea or vomiting.  WBC increased to 12.4. Now having some drainage from incisional site.     O:   Blood pressure 117/57, pulse 78, temperature 37.1 °C (98.8 °F), resp. rate 18, height 1.626 m (5' 4\"), weight 69.5 kg (153 lb 3.5 oz), SpO2 94 %.  Recent Labs      03/29/18 2035 03/30/18 0414 03/31/18 0411   SODIUM  130*  134*  135   POTASSIUM  3.7  3.3*  3.8   CHLORIDE  101  106  103   CO2  23  23  23   GLUCOSE  88  158*  107*   BUN  19  13  14   CREATININE  1.10  0.97  0.94   CALCIUM  8.2*  7.6*  8.5     Recent Labs      03/29/18 2035 03/30/18 0414 03/31/18 0411   WBC  14.3*  10.9*  12.4*   RBC  4.27*  4.05*  4.45*   HEMOGLOBIN  13.3*  12.5*  13.8*   HEMATOCRIT  37.9*  36.0*  39.8*   MCV  88.8  88.9  89.4   MCH  31.1  30.9  31.0   MCHC  35.1  34.7  34.7   RDW  40.7  41.1  40.6   PLATELETCT  181  167  201   MPV  11.5  11.6  11.4         Intake/Output Summary (Last 24 hours) at 03/31/18 0901  Last data filed at 03/31/18 0800   Gross per 24 hour   Intake             2320 ml   Output                0 ml   Net             2320 ml       Exam:  Abdomen soft, benign. Unable to palpate right testicle. Thick scrotal skin. Scan serous drainage from incision.   Urine: clear      A/P:    Active Hospital Problems    Diagnosis   • Cellulitis [L03.90]     Priority: High   • Hydrocele [N43.3]     Priority: Medium   • Hyponatremia [E87.1]   • Hypokalemia [E87.6]   • Leukocytosis [D72.829]   • Essential (primary) hypertension [I10]       Stable.   Ambulate, IS.  Repeat scrotal US  NPO except sips with meds until imaging reviewed  "

## 2018-03-31 NOTE — CARE PLAN
Problem: Safety  Goal: Will remain free from injury  Outcome: PROGRESSING AS EXPECTED  Pt ambulates with a steady gate

## 2018-03-31 NOTE — PROGRESS NOTES
Infectious Disease Progress Note    Author: Angela Rico M.D. Date & Time of service: 3/31/2018  2:19 PM    Chief Complaint:  Scrotal cellulitis      Interval History:  63 y.o. male admitted 3/29/2018 with worsening testicular pain and swelling  3/31 Temp 100.2 WBC 12.4 persistent pain, erythema, swelling scrotum suprapubic erythema resolved  Labs Reviewed, Medications Reviewed, Radiology Reviewed and Wound Reviewed.    Review of Systems:  Review of Systems   Constitutional: Negative for chills and fever.   Gastrointestinal: Negative for nausea and vomiting.   Genitourinary:        Pain in scrotum       Hemodynamics:  Temp (24hrs), Av.6 °C (99.6 °F), Min:37.1 °C (98.8 °F), Max:37.9 °C (100.2 °F)  Temperature: 37.1 °C (98.8 °F)  Pulse  Av  Min: 68  Max: 78   Blood Pressure : 117/57       Physical Exam:  Physical Exam   Constitutional: He is oriented to person, place, and time. He appears well-developed. No distress.   HENT:   Head: Normocephalic and atraumatic.   Eyes: EOM are normal. Pupils are equal, round, and reactive to light.   Neck: Neck supple.   Cardiovascular: Normal rate.    Pulmonary/Chest: Effort normal. No respiratory distress.   Abdominal: Soft. He exhibits no distension.   Genitourinary:   Genitourinary Comments: Diffusely edematous and swollen scrotum  Drainage from prior surgical site   Musculoskeletal: He exhibits no edema.   Neurological: He is alert and oriented to person, place, and time.   Skin: There is erythema.   Nursing note and vitals reviewed.      Meds:    Current Facility-Administered Medications:   •  MD ALERT... vancomycin  •  vancomycin  •  losartan  •  senna-docusate **AND** polyethylene glycol/lytes **AND** magnesium hydroxide **AND** bisacodyl  •  ondansetron  •  acetaminophen  •  ibuprofen  •  [COMPLETED] piperacillin-tazobactam **AND** piperacillin-tazobactam    Labs:  Recent Labs      18   0414  18   0411   WBC  14.3*  10.9*  12.4*    RBC  4.27*  4.05*  4.45*   HEMOGLOBIN  13.3*  12.5*  13.8*   HEMATOCRIT  37.9*  36.0*  39.8*   MCV  88.8  88.9  89.4   MCH  31.1  30.9  31.0   RDW  40.7  41.1  40.6   PLATELETCT  181  167  201   MPV  11.5  11.6  11.4   NEUTSPOLYS  69.90  66.90  69.40   LYMPHOCYTES  21.00*  22.50  20.20*   MONOCYTES  8.40  9.60  8.60   EOSINOPHILS  0.10  0.10  0.20   BASOPHILS  0.20  0.40  0.20     Recent Labs      03/29/18 2035 03/30/18 0414 03/31/18 0411   SODIUM  130*  134*  135   POTASSIUM  3.7  3.3*  3.8   CHLORIDE  101  106  103   CO2  23  23  23   GLUCOSE  88  158*  107*   BUN  19  13  14     Recent Labs      03/29/18 2035 03/30/18 0414 03/31/18 0411   ALBUMIN  3.3   --   2.8*   TBILIRUBIN  0.5   --    --    ALKPHOSPHAT  54   --    --    TOTPROTEIN  6.0   --    --    ALTSGPT  15   --    --    ASTSGOT  17   --    --    CREATININE  1.10  0.97  0.94       Imaging:  [id number]    Result Date: 3/31/2018  3/31/2018 1:04 PM HISTORY/REASON FOR EXAM: Swelling. TECHNIQUE/EXAM DESCRIPTION: Real-time sonography of the scrotum was performed with gray-scale, color and duplex Doppler imaging. COMPARISON: 3/29/2018 FINDINGS: The testes are homogeneous in echotexture and low-resistive waveforms are confirmed bilaterally. No intratesticular solid mass is seen. The right testis measures 4.10 cm x 2.13 cm x 2.45 cm. The left testis measures  4.84 cm x 2.78 cm x 4.14 cm. Again noted is a significantly complicated right hydrocele. No associated significant hyperemia seen. There is a small left intratesticular cyst measuring 6.5 x 5 x 6.9 mm. There is a small complicated left hydrocele.     Large complicated right hydrocele is again noted. This may be post hemorrhagic. No hyperemia is identified to indicate superinfection but this is not entirely excluded. Small mildly complicated left hydrocele is again seen. Small intratesticular cyst is again seen on the left. Mildly dampened waveform is again noted on the right.  Findings are not significantly changed compared to the prior examination.    [id number]    Result Date: 3/29/2018  3/29/2018 8:45 PM HISTORY/REASON FOR EXAM: Right hemiscrotum pain and swelling. Hydrocele drainage one month and 1 day ago . TECHNIQUE/EXAM DESCRIPTION: Real-time sonography of the scrotum was performed with gray-scale, color and duplex Doppler imaging. COMPARISON: None FINDINGS: Large complicated fluid collection fills the right hemiscrotum measuring 5 cm short axis with extensive septations but no nodularity or internal vascular flow. No adjacent hyperemia Small left hydrocele has mild internal echoes. No septations The testes are homogeneous in echotexture and low-resistive waveforms are confirmed bilaterally but the right waveform is slightly delayed in upstroke. No intratesticular mass is seen The right testis measures 4.03 cm x 1.87 cm x 2.69 cm. The left testis measures  5.55 cm x 2.70 cm x 3.39 cm. It contains a 6 mm simple cyst. There is no epididymal enlargement. The right epididymis is not distinctly seen No varicocele is detected. There is some inguinal edema and skin thickening on the right with mildly prominent nodes measuring up to 21 x 7 mm     Large complex right hydrocele most likely is hemorrhagic. No hyperemia to indicate superinfection although clinical correlation is advised Small mildly complicated left hydrocele without evidence of superinfection Vascular flow is confirmed to bilateral testes with low resistive waveforms. There is some asymmetry with mildly damped waveform on the right. Torsion will generally result in the opposite, a highly resistive waveform. No findings suspicious for left orchitis Simple left intratesticular cyst is of no significance      Micro:  Results     Procedure Component Value Units Date/Time    URINE CULTURE(NEW) [[id number]] Collected:  03/29/18 2140    Order Status:  Completed Specimen:  Urine Updated:  03/31/18 1402     Significant  "Indicator NEG     Source UR     Site --     Urine Culture No growth at 24 hours.    Narrative:       Indication for culture:->Emergency Room Patient    BLOOD CULTURE [[id number]] Collected:  03/29/18 2035    Order Status:  Completed Specimen:  Blood from Peripheral Updated:  03/30/18 0715     Significant Indicator NEG     Source BLD     Site PERIPHERAL     Blood Culture No Growth    Note: Blood cultures are incubated for 5 days and  are monitored continuously.Positive blood cultures  are called to the RN and reported as soon as  they are identified.    Blood culture testing and Gram stain, if indicated, are  performed at [facility], [address] [place] [address].  Positive blood cultures are  sent to [facility], [address] [place] [address], for organism identification and  susceptibility testing.      Narrative:       1 of 2 for Blood Culture x 2 sites order. Per Hospital  Policy: Only change Specimen Src: to \"Line\" if specified by  physician order.    BLOOD CULTURE [[id number]] Collected:  03/29/18 2042    Order Status:  Completed Specimen:  Blood from Peripheral Updated:  03/30/18 0715     Significant Indicator NEG     Source BLD     Site PERIPHERAL     Blood Culture No Growth    Note: Blood cultures are incubated for 5 days and  are monitored continuously.Positive blood cultures  are called to the RN and reported as soon as  they are identified.    Blood culture testing and Gram stain, if indicated, are  performed at [facility], [facility]  [facility].[place], [place].  Positive blood cultures are  sent to [facility], [address] [place] [address], for organism identification and  susceptibility testing.      Narrative:       2 of 2 blood culture x2  Sites order. Per Hospital Policy:  Only change Specimen Src: to \"Line\" if specified by physician  order.    URINALYSIS [[id number]]  (Abnormal) Collected:  03/29/18 2140 "    Order Status:  Completed Specimen:  Urine Updated:  03/29/18 3642     Color Yellow     Character Clear     Specific Gravity 1.020     Ph 5.5     Glucose Negative mg/dL      Ketones 15 (A) mg/dL      Protein Negative mg/dL      Bilirubin Negative     Nitrite Negative     Leukocyte Esterase Negative     Occult Blood Negative     Micro Urine Req see below     Comment: Microscopic examination not performed when specimen is clear  and chemically negative for protein, blood, leukocyte esterase  and nitrite.         Narrative:       Indication for culture:->Emergency Room Patient          Assessment:  Active Hospital Problems    Diagnosis   • Cellulitis [L03.90]   • Hydrocele [N43.3]   • Hyponatremia [E87.1]   • Hypokalemia [E87.6]   • Leukocytosis [D72.829]   • Essential (primary) hypertension [I10]       Plan:  Scrotal cellulitis, unstable  Low grade fever  +leukocytosis  Blood cxs neg  No signif change repeat USG-hemorrhagic right hydrocele  Changed back to IV abx-vanco and Zosyn  Urology following  Monitor closely    Discussed with internal medicine.

## 2018-03-31 NOTE — PROGRESS NOTES
Report received from night RN . Patient resting in bed, denies any needs at this time. Antibiotics infusing.

## 2018-03-31 NOTE — PROGRESS NOTES
Assessment completed, vss, except 99.6 febrile. Scrotal edema with no improvement, pt reports slight improvement to swelling to upper pelvic area.

## 2018-03-31 NOTE — PROGRESS NOTES
[facility] Progress [facility]    Date of Service: 3/30/2018    Chief Complaint  63 y.o. male admitted 3/29/2018 with worsening testicular pain and swelling.  The patient was sent from urology clinic to this hospital as the patient's urologist is concerned about possible infection.    Interval Problem Update  3/30:  The patient states that redness around his private part has reduced but his scrotum is still swollen.    Consultants/Specialty  Urologist  ID specialist: Dr. Tyson.    Disposition  Home.        Review of Systems   Constitutional: Negative for chills and fever.   Eyes: Negative for blurred vision.   Respiratory: Negative for shortness of breath.    Cardiovascular: Negative for chest pain.   Gastrointestinal: Negative for abdominal pain, nausea and vomiting.   Genitourinary: Negative for dysuria.   Neurological: Negative for dizziness.      Physical Exam  Laboratory/Imaging   Hemodynamics  Temp (24hrs), Av.7 °C (99.8 °F), Min:37.1 °C (98.7 °F), Max:38.2 °C (100.8 °F)   Temperature: 37.9 °C (100.2 °F)  Pulse  Av  Min: 68  Max: 78    Blood Pressure : 118/70, NIBP: 122/66      Respiratory      Respiration: 16, Pulse Oximetry: 94 %             Fluids    Intake/Output Summary (Last 24 hours) at 18 1843  Last data filed at 18 1800   Gross per 24 hour   Intake             2860 ml   Output             1250 ml   Net             1610 ml       Nutrition  Orders Placed This Encounter   Procedures   • DIET ORDER     Standing Status:   Standing     Number of Occurrences:   1     Order Specific Question:   Diet:     Answer:   Cardiac [6]     Physical Exam   Constitutional: He is oriented to person, place, and time. He appears well-developed and well-nourished. No distress.   HENT:   Head: Normocephalic and atraumatic.   Mouth/Throat: Oropharynx is clear and moist. No oropharyngeal exudate.   Eyes: Pupils are equal, round, and reactive to light. Right eye exhibits no discharge. Left eye exhibits no  discharge. No scleral icterus.   Neck: Neck supple. No JVD present. No thyromegaly present.   Cardiovascular: Normal rate and regular rhythm.    No murmur heard.  Pulmonary/Chest: Effort normal and breath sounds normal. No respiratory distress.   Abdominal: Soft. Bowel sounds are normal. He exhibits no distension.   Genitourinary: No penile tenderness.   Genitourinary Comments: scrotum is markedly erythematous and swollen. Right side is very firm to touch.  Left upper pole is soft to touch.  Left lower pole is firm.   Musculoskeletal: Normal range of motion. He exhibits no edema.   Neurological: He is alert and oriented to person, place, and time.   Skin: Skin is warm and dry. He is not diaphoretic. No erythema.   Psychiatric: He has a normal mood and affect.       Recent Labs      03/29/18 2035 03/30/18   0414   WBC  14.3*  10.9*   RBC  4.27*  4.05*   HEMOGLOBIN  13.3*  12.5*   HEMATOCRIT  37.9*  36.0*   MCV  88.8  88.9   MCH  31.1  30.9   MCHC  35.1  34.7   RDW  40.7  41.1   PLATELETCT  181  167   MPV  11.5  11.6     Recent Labs      03/29/18 2035 03/30/18   0414   SODIUM  130*  134*   POTASSIUM  3.7  3.3*   CHLORIDE  101  106   CO2  23  23   GLUCOSE  88  158*   BUN  19  13   CREATININE  1.10  0.97   CALCIUM  8.2*  7.6*                      Assessment/Plan     Cellulitis- (present on admission)   Assessment & Plan    Scrotal cellulitis with possible underlying infected hydrocele. Patient did have a leukocytosis and a mild fever. Continue IV antibiotics and consulted ID specialist.        Hydrocele- (present on admission)   Assessment & Plan    Ultrasound shows evidence of a right large complex hydrocele and a smaller left hydrocele. Discussed with urology team.  No surgery planned today.        Hypokalemia   Assessment & Plan    Replete and monitor.  Check magnesium level.        Hyponatremia   Assessment & Plan    Sodium level improving.  Continue to monitor.        Leukocytosis- (present on admission)    Assessment & Plan    Wbc trending downward. In the setting of cellulitis. Continue IV antibiotics.        Essential (primary) hypertension- (present on admission)   Assessment & Plan    Will continue patient's home medication and monitor.          Quality-Core Measures   Reviewed items::  Labs reviewed and Medications reviewed  Shaw catheter::  No Shaw  DVT prophylaxis - mechanical:  SCDs

## 2018-03-31 NOTE — PROGRESS NOTES
Repeat scrotal US reviewed and discussed with patient  No significant change on US.  No abscess.  Long discussion regarding treatment.  Urology will continue to follow    BRIDGET Zarate

## 2018-03-31 NOTE — CARE PLAN
Problem: Bowel/Gastric:  Goal: Normal bowel function is maintained or improved  Outcome: PROGRESSING AS EXPECTED  Pt states he had a small bowel movement overnight.

## 2018-03-31 NOTE — PROGRESS NOTES
[facility] Hospitalist Progress Note    Date of Service: 3/31/2018    Chief Complaint  63 y.o. male admitted 3/29/2018 with worsening testicular pain and swelling.  The patient was sent from urology clinic to this hospital as the patient's urologist is concerned about possible infection.    Interval Problem Update  3/30:  The patient states that redness around his private part has reduced but his scrotum is still swollen.  3/31:  The patient states there was some bloody fluid draining from the right side of his scrotum this morning after using restroom.      Consultants/Specialty  Urologist  ID specialist: Dr. Tyson.    Disposition  Home.        Review of Systems   Constitutional: Negative for chills and fever.   Eyes: Negative for blurred vision.   Respiratory: Negative for shortness of breath.    Cardiovascular: Negative for chest pain.   Gastrointestinal: Negative for abdominal pain, nausea and vomiting.   Genitourinary: Negative for dysuria.   Neurological: Negative for dizziness.      Physical Exam  Laboratory/Imaging   Hemodynamics  Temp (24hrs), Av.7 °C (99.8 °F), Min:37.3 °C (99.2 °F), Max:37.9 °C (100.2 °F)   Temperature: 37.3 °C (99.2 °F)  Pulse  Av.7  Min: 68  Max: 78    Blood Pressure : 114/60      Respiratory      Respiration: 16, Pulse Oximetry: 91 %             Fluids    Intake/Output Summary (Last 24 hours) at 18 0837  Last data filed at 18 0600   Gross per 24 hour   Intake             1960 ml   Output                0 ml   Net             1960 ml       Nutrition  Orders Placed This Encounter   Procedures   • DIET ORDER     Standing Status:   Standing     Number of Occurrences:   1     Order Specific Question:   Diet:     Answer:   Cardiac [6]     Physical Exam   Constitutional: He is oriented to person, place, and time. He appears well-developed and well-nourished. No distress.   HENT:   Head: Normocephalic and atraumatic.   Mouth/Throat: Oropharynx is clear and moist. No  oropharyngeal exudate.   Eyes: Pupils are equal, round, and reactive to light. Right eye exhibits no discharge. Left eye exhibits no discharge. No scleral icterus.   Neck: Neck supple. No JVD present. No thyromegaly present.   Cardiovascular: Normal rate and regular rhythm.    No murmur heard.  Pulmonary/Chest: Effort normal and breath sounds normal. No respiratory distress.   Abdominal: Soft. Bowel sounds are normal. He exhibits no distension.   Genitourinary: No penile tenderness.   Genitourinary Comments: scrotum is markedly erythematous and swollen. Right side is very firm to touch.  Left upper pole is soft to touch.  Left lower pole is firm.  The towel using to support the patient's scrotum appears bloody.   Musculoskeletal: Normal range of motion. He exhibits no edema.   Neurological: He is alert and oriented to person, place, and time.   Skin: Skin is warm and dry. He is not diaphoretic. No erythema.   Psychiatric: He has a normal mood and affect.       Recent Labs      03/29/18 2035 03/30/18 0414 03/31/18 0411   WBC  14.3*  10.9*  12.4*   RBC  4.27*  4.05*  4.45*   HEMOGLOBIN  13.3*  12.5*  13.8*   HEMATOCRIT  37.9*  36.0*  39.8*   MCV  88.8  88.9  89.4   MCH  31.1  30.9  31.0   MCHC  35.1  34.7  34.7   RDW  40.7  41.1  40.6   PLATELETCT  181  167  201   MPV  11.5  11.6  11.4     Recent Labs      03/29/18 2035 03/30/18 0414 03/31/18 0411   SODIUM  130*  134*  135   POTASSIUM  3.7  3.3*  3.8   CHLORIDE  101  106  103   CO2  23  23  23   GLUCOSE  88  158*  107*   BUN  19  13  14   CREATININE  1.10  0.97  0.94   CALCIUM  8.2*  7.6*  8.5                      Assessment/Plan     Cellulitis- (present on admission)   Assessment & Plan    Scrotal cellulitis with possible underlying infected hydrocele.  Continue IV antibiotics per ID specialist recommendation..        Hydrocele- (present on admission)   Assessment & Plan    Repeat ultrasound done today.  Result reviewed. Discussed the case with  urology team.  No surgery planned today.        Hypokalemia   Assessment & Plan    Replete and monitor.  Check magnesium level.        Hyponatremia   Assessment & Plan    Sodium level improving.  Continue to monitor.        Leukocytosis- (present on admission)   Assessment & Plan    Wbc increased today. Continue IV antibiotics.  Consider I&D if the patient's leukocytosis continues to worsen.        Essential (primary) hypertension- (present on admission)   Assessment & Plan    Will continue patient's home medication and monitor.          Quality-Core Measures   Reviewed items::  Labs reviewed and Medications reviewed  Shaw catheter::  No Shaw  DVT prophylaxis - mechanical:  SCDs

## 2018-04-01 ENCOUNTER — APPOINTMENT (OUTPATIENT)
Dept: RADIOLOGY | Facility: MEDICAL CENTER | Age: 63
DRG: 863 | End: 2018-04-01
Attending: UROLOGY
Payer: MEDICARE

## 2018-04-01 LAB
ALBUMIN SERPL BCP-MCNC: 2.6 G/DL (ref 3.2–4.9)
BACTERIA UR CULT: NORMAL
BASOPHILS # BLD AUTO: 0.3 % (ref 0–1.8)
BASOPHILS # BLD: 0.04 K/UL (ref 0–0.12)
BUN SERPL-MCNC: 13 MG/DL (ref 8–22)
CALCIUM SERPL-MCNC: 8.5 MG/DL (ref 8.4–10.2)
CHLORIDE SERPL-SCNC: 107 MMOL/L (ref 96–112)
CO2 SERPL-SCNC: 25 MMOL/L (ref 20–33)
CREAT SERPL-MCNC: 0.89 MG/DL (ref 0.5–1.4)
EOSINOPHIL # BLD AUTO: 0.05 K/UL (ref 0–0.51)
EOSINOPHIL NFR BLD: 0.4 % (ref 0–6.9)
ERYTHROCYTE [DISTWIDTH] IN BLOOD BY AUTOMATED COUNT: 39.4 FL (ref 35.9–50)
GLUCOSE SERPL-MCNC: 100 MG/DL (ref 65–99)
HCT VFR BLD AUTO: 39.6 % (ref 42–52)
HGB BLD-MCNC: 13.7 G/DL (ref 14–18)
IMM GRANULOCYTES # BLD AUTO: 0.08 K/UL (ref 0–0.11)
IMM GRANULOCYTES NFR BLD AUTO: 0.6 % (ref 0–0.9)
LYMPHOCYTES # BLD AUTO: 3.15 K/UL (ref 1–4.8)
LYMPHOCYTES NFR BLD: 23.5 % (ref 22–41)
MCH RBC QN AUTO: 30.9 PG (ref 27–33)
MCHC RBC AUTO-ENTMCNC: 34.6 G/DL (ref 33.7–35.3)
MCV RBC AUTO: 89.4 FL (ref 81.4–97.8)
MONOCYTES # BLD AUTO: 1.05 K/UL (ref 0–0.85)
MONOCYTES NFR BLD AUTO: 7.8 % (ref 0–13.4)
NEUTROPHILS # BLD AUTO: 9.01 K/UL (ref 1.82–7.42)
NEUTROPHILS NFR BLD: 67.4 % (ref 44–72)
NRBC # BLD AUTO: 0 K/UL
NRBC BLD-RTO: 0 /100 WBC
PHOSPHATE SERPL-MCNC: 4 MG/DL (ref 2.5–4.5)
PLATELET # BLD AUTO: 199 K/UL (ref 164–446)
PMV BLD AUTO: 11.1 FL (ref 9–12.9)
POTASSIUM SERPL-SCNC: 3.5 MMOL/L (ref 3.6–5.5)
RBC # BLD AUTO: 4.43 M/UL (ref 4.7–6.1)
SIGNIFICANT IND 70042: NORMAL
SITE SITE: NORMAL
SODIUM SERPL-SCNC: 138 MMOL/L (ref 135–145)
SOURCE SOURCE: NORMAL
WBC # BLD AUTO: 13.4 K/UL (ref 4.8–10.8)

## 2018-04-01 PROCEDURE — A9270 NON-COVERED ITEM OR SERVICE: HCPCS | Performed by: STUDENT IN AN ORGANIZED HEALTH CARE EDUCATION/TRAINING PROGRAM

## 2018-04-01 PROCEDURE — 87205 SMEAR GRAM STAIN: CPT

## 2018-04-01 PROCEDURE — 700105 HCHG RX REV CODE 258: Performed by: INTERNAL MEDICINE

## 2018-04-01 PROCEDURE — 87070 CULTURE OTHR SPECIMN AEROBIC: CPT

## 2018-04-01 PROCEDURE — 700105 HCHG RX REV CODE 258: Performed by: STUDENT IN AN ORGANIZED HEALTH CARE EDUCATION/TRAINING PROGRAM

## 2018-04-01 PROCEDURE — 99233 SBSQ HOSP IP/OBS HIGH 50: CPT | Performed by: INTERNAL MEDICINE

## 2018-04-01 PROCEDURE — 85025 COMPLETE CBC W/AUTO DIFF WBC: CPT

## 2018-04-01 PROCEDURE — 700102 HCHG RX REV CODE 250 W/ 637 OVERRIDE(OP): Performed by: STUDENT IN AN ORGANIZED HEALTH CARE EDUCATION/TRAINING PROGRAM

## 2018-04-01 PROCEDURE — 700102 HCHG RX REV CODE 250 W/ 637 OVERRIDE(OP): Performed by: INTERNAL MEDICINE

## 2018-04-01 PROCEDURE — 770006 HCHG ROOM/CARE - MED/SURG/GYN SEMI*

## 2018-04-01 PROCEDURE — 36415 COLL VENOUS BLD VENIPUNCTURE: CPT

## 2018-04-01 PROCEDURE — 700111 HCHG RX REV CODE 636 W/ 250 OVERRIDE (IP): Performed by: INTERNAL MEDICINE

## 2018-04-01 PROCEDURE — A9270 NON-COVERED ITEM OR SERVICE: HCPCS | Performed by: INTERNAL MEDICINE

## 2018-04-01 PROCEDURE — 87077 CULTURE AEROBIC IDENTIFY: CPT

## 2018-04-01 PROCEDURE — 87186 SC STD MICRODIL/AGAR DIL: CPT

## 2018-04-01 PROCEDURE — 80069 RENAL FUNCTION PANEL: CPT

## 2018-04-01 PROCEDURE — 700111 HCHG RX REV CODE 636 W/ 250 OVERRIDE (IP): Performed by: STUDENT IN AN ORGANIZED HEALTH CARE EDUCATION/TRAINING PROGRAM

## 2018-04-01 RX ORDER — LINEZOLID 600 MG/1
600 TABLET, FILM COATED ORAL EVERY 12 HOURS
Status: DISCONTINUED | OUTPATIENT
Start: 2018-04-01 | End: 2018-04-04 | Stop reason: HOSPADM

## 2018-04-01 RX ORDER — KETOROLAC TROMETHAMINE 30 MG/ML
30 INJECTION, SOLUTION INTRAMUSCULAR; INTRAVENOUS ONCE
Status: COMPLETED | OUTPATIENT
Start: 2018-04-01 | End: 2018-04-01

## 2018-04-01 RX ADMIN — PIPERACILLIN AND TAZOBACTAM 3.38 G: 3; .375 INJECTION, POWDER, FOR SOLUTION INTRAVENOUS at 21:08

## 2018-04-01 RX ADMIN — LINEZOLID 600 MG: 600 TABLET, FILM COATED ORAL at 09:17

## 2018-04-01 RX ADMIN — LOSARTAN POTASSIUM 100 MG: 25 TABLET, FILM COATED ORAL at 09:17

## 2018-04-01 RX ADMIN — LINEZOLID 600 MG: 600 TABLET, FILM COATED ORAL at 21:07

## 2018-04-01 RX ADMIN — PIPERACILLIN AND TAZOBACTAM 3.38 G: 3; .375 INJECTION, POWDER, FOR SOLUTION INTRAVENOUS at 05:50

## 2018-04-01 RX ADMIN — KETOROLAC TROMETHAMINE 30 MG: 30 INJECTION, SOLUTION INTRAMUSCULAR; INTRAVENOUS at 13:22

## 2018-04-01 RX ADMIN — VANCOMYCIN HYDROCHLORIDE 1000 MG: 1 INJECTION, POWDER, LYOPHILIZED, FOR SOLUTION INTRAVENOUS at 01:50

## 2018-04-01 RX ADMIN — PIPERACILLIN AND TAZOBACTAM 3.38 G: 3; .375 INJECTION, POWDER, FOR SOLUTION INTRAVENOUS at 13:15

## 2018-04-01 ASSESSMENT — ENCOUNTER SYMPTOMS
DIZZINESS: 0
NAUSEA: 0
ABDOMINAL PAIN: 0
BLURRED VISION: 0
SHORTNESS OF BREATH: 0
VOMITING: 0
NERVOUS/ANXIOUS: 1
FEVER: 0
CHILLS: 0

## 2018-04-01 ASSESSMENT — PAIN SCALES - GENERAL: PAINLEVEL_OUTOF10: 2

## 2018-04-01 NOTE — PROGRESS NOTES
Urology    Feeling well. No decrease in WBC. Superficial abscess drained and packed. Culture sent.  Plan  Repeat scrotal US in AM.  Repeat CBC in AM.  Consider surgical exploration if not improving.    I did not type this note in the computer, someone used my sign on.

## 2018-04-01 NOTE — PROGRESS NOTES
"Assumed patient care at 0715 from night shift.  Awakened patient for breakfast at 0745.  At that time he requested report on his WBC count which I provided.  States \"Oh great I am not making any progress.\"  This writer offered to change his dressing and observe drainage and he declined at this time.  \"We can do that later.\"  Updated on names of physicians and plan for further antibiotics today.  "

## 2018-04-01 NOTE — PROGRESS NOTES
Marc her at the bedside.  He probed patient's scrotum and expressed fluid that was sent for culture.  He also packed opening with 1/4 inch iodoform guaze and placed dry 4x4's over the opening.  Patient tolerated the procedure well.

## 2018-04-01 NOTE — PROGRESS NOTES
"Received report from day RN.  Seen pt with day RN, he is irritable about what is going on, saying he \"does not want to go home again with a f** grapefruit in between my legs!\"  Dressing changed by day RN, will monitor this for saturation and will notify MD appropriately.  Will monitor his Temp too.  Pt requesting to have his night medication early.  Will continue to monitor.    2015 Pt expressed that he would like to talk to the urologist this AM.    0553 attempted to check pt's dressings for saturation. Pt refused states that he would like to go back to sleep.  "

## 2018-04-01 NOTE — PROGRESS NOTES
[facility]    Date of Service: 2018    Chief Complaint  63 y.o. male admitted 3/29/2018 with worsening testicular pain and swelling.  The patient was sent from urology clinic to this hospital as the patient's urologist is concerned about possible infection.    Interval Problem Update  3/30:  The patient states that redness around his private part has reduced but his scrotum is still swollen.  3/31:  The patient states there was some bloody fluid draining from the right side of his scrotum this morning after using restroom.     :  The patient states that his right testicle is continuously draining.       Consultants/Specialty  Urologist  ID specialist: Dr. Tyson.    Disposition  Home.        Review of Systems   Constitutional: Negative for chills and fever.   Eyes: Negative for blurred vision.   Respiratory: Negative for shortness of breath.    Cardiovascular: Negative for chest pain.   Gastrointestinal: Negative for abdominal pain, nausea and vomiting.   Genitourinary: Negative for dysuria.   Neurological: Negative for dizziness.      Physical Exam  Laboratory/Imaging   Hemodynamics  Temp (24hrs), Av.9 °C (98.5 °F), Min:36.4 °C (97.5 °F), Max:38 °C (100.4 °F)   Temperature: 36.7 °C (98.1 °F)  Pulse  Av  Min: 62  Max: 85    Blood Pressure : 120/70      Respiratory      Respiration: 18, Pulse Oximetry: 94 %             Fluids    Intake/Output Summary (Last 24 hours) at 18 1539  Last data filed at 18 1000   Gross per 24 hour   Intake              750 ml   Output                0 ml   Net              750 ml       Nutrition  Orders Placed This Encounter   Procedures   • DIET ORDER     Standing Status:   Standing     Number of Occurrences:   1     Order Specific Question:   Diet:     Answer:   Cardiac [6]     Physical Exam   Constitutional: He is oriented to person, place, and time. He appears well-developed and well-nourished. No distress.   HENT:   Head: Normocephalic  and atraumatic.   Mouth/Throat: Oropharynx is clear and moist. No oropharyngeal exudate.   Eyes: Pupils are equal, round, and reactive to light. Right eye exhibits no discharge. Left eye exhibits no discharge. No scleral icterus.   Neck: Neck supple. No JVD present. No thyromegaly present.   Cardiovascular: Normal rate and regular rhythm.    No murmur heard.  Pulmonary/Chest: Effort normal and breath sounds normal. No respiratory distress.   Abdominal: Soft. Bowel sounds are normal. He exhibits no distension.   Genitourinary: No penile tenderness.   Genitourinary Comments: scrotum is markedly erythematous and swollen. Right side is very firm to touch.  Left upper pole is soft to touch.  Left lower pole is firm. Wound dressing is soaked with clear yellow drainage mixed with blood.   Musculoskeletal: Normal range of motion. He exhibits no edema.   Neurological: He is alert and oriented to person, place, and time.   Skin: Skin is warm and dry. He is not diaphoretic. No erythema.   Psychiatric: He has a normal mood and affect.       Recent Labs      03/30/18 0414  03/31/18 0411 04/01/18   0446   WBC  10.9*  12.4*  13.4*   RBC  4.05*  4.45*  4.43*   HEMOGLOBIN  12.5*  13.8*  13.7*   HEMATOCRIT  36.0*  39.8*  39.6*   MCV  88.9  89.4  89.4   MCH  30.9  31.0  30.9   MCHC  34.7  34.7  34.6   RDW  41.1  40.6  39.4   PLATELETCT  167  201  199   MPV  11.6  11.4  11.1     Recent Labs      03/30/18   0414  03/31/18 0411  04/01/18   0446   SODIUM  134*  135  138   POTASSIUM  3.3*  3.8  3.5*   CHLORIDE  106  103  107   CO2  23  23  25   GLUCOSE  158*  107*  100*   BUN  13  14  13   CREATININE  0.97  0.94  0.89   CALCIUM  7.6*  8.5  8.5                      Assessment/Plan     Cellulitis- (present on admission)   Assessment & Plan    Scrotal cellulitis with possible underlying infected hydrocele.  Additional antibiotic added today.   Zosyn and zyvox per ID specialist recommendation..        Hydrocele- (present on admission)    Assessment & Plan    Repeat ultrasound results reviewed.  Awaiting for urologist input.        Hypokalemia   Assessment & Plan    Replete and monitor. magnesium level is adequate.        Hyponatremia   Assessment & Plan    Sodium level improved.  Continue to monitor.        Leukocytosis- (present on admission)   Assessment & Plan    Wbc trending upward. Continue IV antibiotics.  Consider I&D if the patient's leukocytosis continues to worsen.        Essential (primary) hypertension- (present on admission)   Assessment & Plan    Will continue the patient's home medication and monitor.          Quality-Core Measures   Reviewed items::  Labs reviewed and Medications reviewed  Shaw catheter::  No Shaw  DVT prophylaxis - mechanical:  SCDs

## 2018-04-01 NOTE — PROGRESS NOTES
"Infectious Disease Progress Note    Author: Angela Rico M.D. Date & Time of service: 2018  12:04 PM    Chief Complaint:  Scrotal cellulitis      Interval History:  63 y.o. male admitted 3/29/2018 with worsening testicular pain and swelling  3/31 Temp 100.2 WBC 12.4 persistent pain, erythema, swelling scrotum suprapubic erythema resolved   Temp 100.4, WBC 13 agitated and irritable-angry about \"grapefruit between his legs\", in the hospital \"too long\", wants to know why was not on abx after surgery, why abx \"have not worked yet\" etc  Labs Reviewed, Medications Reviewed, Radiology Reviewed and Wound Reviewed.    Review of Systems:  Review of Systems   Constitutional: Negative for chills and fever.   Gastrointestinal: Negative for nausea and vomiting.   Genitourinary:        Pain in scrotum   Psychiatric/Behavioral: The patient is nervous/anxious.        Hemodynamics:  Temp (24hrs), Av.2 °C (99 °F), Min:36.4 °C (97.5 °F), Max:38 °C (100.4 °F)  Temperature: 36.6 °C (97.8 °F)  Pulse  Av.1  Min: 62  Max: 85   Blood Pressure : 131/72       Physical Exam:  Physical Exam   Constitutional: He is oriented to person, place, and time. He appears well-developed. No distress.   HENT:   Head: Normocephalic and atraumatic.   Eyes: EOM are normal. Pupils are equal, round, and reactive to light.   Neck: Neck supple.   Cardiovascular: Normal rate.    Pulmonary/Chest: Effort normal. No respiratory distress.   Abdominal: Soft. He exhibits no distension.   Genitourinary:   Genitourinary Comments: Diffusely edematous and swollen scrotum  Drainage from prior surgical site   Musculoskeletal: He exhibits no edema.   Neurological: He is alert and oriented to person, place, and time.   Skin: There is erythema.   Nursing note and vitals reviewed.      Meds:    Current Facility-Administered Medications:   •  linezolid  •  losartan  •  senna-docusate **AND** polyethylene glycol/lytes **AND** magnesium hydroxide **AND** " bisacodyl  •  ondansetron  •  acetaminophen  •  ibuprofen  •  [COMPLETED] piperacillin-tazobactam **AND** piperacillin-tazobactam    Labs:  Recent Labs      03/30/18 0414 03/31/18 0411 04/01/18 0446   WBC  10.9*  12.4*  13.4*   RBC  4.05*  4.45*  4.43*   HEMOGLOBIN  12.5*  13.8*  13.7*   HEMATOCRIT  36.0*  39.8*  39.6*   MCV  88.9  89.4  89.4   MCH  30.9  31.0  30.9   RDW  41.1  40.6  39.4   PLATELETCT  167  201  199   MPV  11.6  11.4  11.1   NEUTSPOLYS  66.90  69.40  67.40   LYMPHOCYTES  22.50  20.20*  23.50   MONOCYTES  9.60  8.60  7.80   EOSINOPHILS  0.10  0.20  0.40   BASOPHILS  0.40  0.20  0.30     Recent Labs      03/30/18 0414 03/31/18 0411 04/01/18 0446   SODIUM  134*  135  138   POTASSIUM  3.3*  3.8  3.5*   CHLORIDE  106  103  107   CO2  23  23  25   GLUCOSE  158*  107*  100*   BUN  13  14  13     Recent Labs      03/29/18 2035 03/30/18 0414 03/31/18 0411 04/01/18 0446   ALBUMIN  3.3   --   2.8*  2.6*   TBILIRUBIN  0.5   --    --    --    ALKPHOSPHAT  54   --    --    --    TOTPROTEIN  6.0   --    --    --    ALTSGPT  15   --    --    --    ASTSGOT  17   --    --    --    CREATININE  1.10  0.97  0.94  0.89       Imaging:  [id number]    Result Date: 3/31/2018  3/31/2018 1:04 PM HISTORY/REASON FOR EXAM: Swelling. TECHNIQUE/EXAM DESCRIPTION: Real-time sonography of the scrotum was performed with gray-scale, color and duplex Doppler imaging. COMPARISON: 3/29/2018 FINDINGS: The testes are homogeneous in echotexture and low-resistive waveforms are confirmed bilaterally. No intratesticular solid mass is seen. The right testis measures 4.10 cm x 2.13 cm x 2.45 cm. The left testis measures  4.84 cm x 2.78 cm x 4.14 cm. Again noted is a significantly complicated right hydrocele. No associated significant hyperemia seen. There is a small left intratesticular cyst measuring 6.5 x 5 x 6.9 mm. There is a small complicated left hydrocele.     Large complicated right hydrocele is again  noted. This may be post hemorrhagic. No hyperemia is identified to indicate superinfection but this is not entirely excluded. Small mildly complicated left hydrocele is again seen. Small intratesticular cyst is again seen on the left. Mildly dampened waveform is again noted on the right. Findings are not significantly changed compared to the prior examination.    [id number]    Result Date: 3/29/2018  3/29/2018 8:45 PM HISTORY/REASON FOR EXAM: Right hemiscrotum pain and swelling. Hydrocele drainage one month and 1 day ago . TECHNIQUE/EXAM DESCRIPTION: Real-time sonography of the scrotum was performed with gray-scale, color and duplex Doppler imaging. COMPARISON: None FINDINGS: Large complicated fluid collection fills the right hemiscrotum measuring 5 cm short axis with extensive septations but no nodularity or internal vascular flow. No adjacent hyperemia Small left hydrocele has mild internal echoes. No septations The testes are homogeneous in echotexture and low-resistive waveforms are confirmed bilaterally but the right waveform is slightly delayed in upstroke. No intratesticular mass is seen The right testis measures 4.03 cm x 1.87 cm x 2.69 cm. The left testis measures  5.55 cm x 2.70 cm x 3.39 cm. It contains a 6 mm simple cyst. There is no epididymal enlargement. The right epididymis is not distinctly seen No varicocele is detected. There is some inguinal edema and skin thickening on the right with mildly prominent nodes measuring up to 21 x 7 mm     Large complex right hydrocele most likely is hemorrhagic. No hyperemia to indicate superinfection although clinical correlation is advised Small mildly complicated left hydrocele without evidence of superinfection Vascular flow is confirmed to bilateral testes with low resistive waveforms. There is some asymmetry with mildly damped waveform on the right. Torsion will generally result in the opposite, a highly resistive waveform. No findings suspicious for  "left orchitis Simple left intratesticular cyst is of no significance      Micro:  Results     Procedure Component Value Units Date/Time    URINE CULTURE(NEW) [[id number]] Collected:  03/29/18 2140    Order Status:  Completed Specimen:  Urine Updated:  04/01/18 0728     Significant Indicator NEG     Source UR     Site --     Urine Culture No growth at 48 hours.    Narrative:       Indication for culture:->Emergency Room Patient    BLOOD CULTURE [[id number]] Collected:  03/29/18 2035    Order Status:  Completed Specimen:  Blood from Peripheral Updated:  03/30/18 0715     Significant Indicator NEG     Source BLD     Site PERIPHERAL     Blood Culture No Growth    Note: Blood cultures are incubated for 5 days and  are monitored continuously.Positive blood cultures  are called to the RN and reported as soon as  they are identified.    Blood culture testing and Gram stain, if indicated, are  performed at [facility], [address] [place] [address].  Positive blood cultures are  sent to [facility], [address] [place] [address], for organism identification and  susceptibility testing.      Narrative:       1 of 2 for Blood Culture x 2 sites order. Per Hospital  Policy: Only change Specimen Src: to \"Line\" if specified by  physician order.    BLOOD CULTURE [[id number]] Collected:  03/29/18 2042    Order Status:  Completed Specimen:  Blood from Peripheral Updated:  03/30/18 0715     Significant Indicator NEG     Source BLD     Site PERIPHERAL     Blood Culture No Growth    Note: Blood cultures are incubated for 5 days and  are monitored continuously.Positive blood cultures  are called to the RN and reported as soon as  they are identified.    Blood culture testing and Gram stain, if indicated, are  performed at [facility], [facility].[place], [place].  Positive blood cultures are  sent to [facility], [address] [place] [address], " "[place], for organism identification and  susceptibility testing.      Narrative:       2 of 2 blood culture x2  Sites order. Per Hospital Policy:  Only change Specimen Src: to \"Line\" if specified by physician  order.    URINALYSIS [[id number]]  (Abnormal) Collected:  03/29/18 2140    Order Status:  Completed Specimen:  Urine Updated:  03/29/18 2143     Color Yellow     Character Clear     Specific Gravity 1.020     Ph 5.5     Glucose Negative mg/dL      Ketones 15 (A) mg/dL      Protein Negative mg/dL      Bilirubin Negative     Nitrite Negative     Leukocyte Esterase Negative     Occult Blood Negative     Micro Urine Req see below     Comment: Microscopic examination not performed when specimen is clear  and chemically negative for protein, blood, leukocyte esterase  and nitrite.         Narrative:       Indication for culture:->Emergency Room Patient          Assessment:  Active Hospital Problems    Diagnosis   • Cellulitis [L03.90]   • Hydrocele [N43.3]   • Hyponatremia [E87.1]   • Hypokalemia [E87.6]   • Leukocytosis [D72.829]   • Essential (primary) hypertension [I10]       Plan:  Scrotal cellulitis, unstable  Low grade fever persistent  +leukocytosis  Blood cxs neg  No signif change on repeat USG-hemorrhagic right hydrocele ?Excision or drainage  Continue Zosyn  Change vanco to Zyvox as blood cxs are neg at 48 hours  Urology following  Monitor closely    Discussed with internal medicine Dr Ashraf  "

## 2018-04-02 ENCOUNTER — APPOINTMENT (OUTPATIENT)
Dept: RADIOLOGY | Facility: MEDICAL CENTER | Age: 63
DRG: 863 | End: 2018-04-02
Attending: UROLOGY
Payer: MEDICARE

## 2018-04-02 ENCOUNTER — PATIENT OUTREACH (OUTPATIENT)
Dept: HEALTH INFORMATION MANAGEMENT | Facility: OTHER | Age: 63
End: 2018-04-02

## 2018-04-02 LAB
ALBUMIN SERPL BCP-MCNC: 2.5 G/DL (ref 3.2–4.9)
BASOPHILS # BLD AUTO: 0.5 % (ref 0–1.8)
BASOPHILS # BLD: 0.05 K/UL (ref 0–0.12)
BUN SERPL-MCNC: 14 MG/DL (ref 8–22)
CALCIUM SERPL-MCNC: 8.4 MG/DL (ref 8.4–10.2)
CHLORIDE SERPL-SCNC: 105 MMOL/L (ref 96–112)
CO2 SERPL-SCNC: 24 MMOL/L (ref 20–33)
CREAT SERPL-MCNC: 0.91 MG/DL (ref 0.5–1.4)
EOSINOPHIL # BLD AUTO: 0.11 K/UL (ref 0–0.51)
EOSINOPHIL NFR BLD: 1.1 % (ref 0–6.9)
ERYTHROCYTE [DISTWIDTH] IN BLOOD BY AUTOMATED COUNT: 39.5 FL (ref 35.9–50)
GLUCOSE SERPL-MCNC: 93 MG/DL (ref 65–99)
GRAM STN SPEC: NORMAL
HCT VFR BLD AUTO: 37.8 % (ref 42–52)
HGB BLD-MCNC: 13.2 G/DL (ref 14–18)
IMM GRANULOCYTES # BLD AUTO: 0.03 K/UL (ref 0–0.11)
IMM GRANULOCYTES NFR BLD AUTO: 0.3 % (ref 0–0.9)
LYMPHOCYTES # BLD AUTO: 3.01 K/UL (ref 1–4.8)
LYMPHOCYTES NFR BLD: 31.2 % (ref 22–41)
MCH RBC QN AUTO: 30.9 PG (ref 27–33)
MCHC RBC AUTO-ENTMCNC: 34.9 G/DL (ref 33.7–35.3)
MCV RBC AUTO: 88.5 FL (ref 81.4–97.8)
MONOCYTES # BLD AUTO: 0.78 K/UL (ref 0–0.85)
MONOCYTES NFR BLD AUTO: 8.1 % (ref 0–13.4)
NEUTROPHILS # BLD AUTO: 5.67 K/UL (ref 1.82–7.42)
NEUTROPHILS NFR BLD: 58.8 % (ref 44–72)
NRBC # BLD AUTO: 0 K/UL
NRBC BLD-RTO: 0 /100 WBC
PHOSPHATE SERPL-MCNC: 4.1 MG/DL (ref 2.5–4.5)
PLATELET # BLD AUTO: 225 K/UL (ref 164–446)
PMV BLD AUTO: 10.9 FL (ref 9–12.9)
POTASSIUM SERPL-SCNC: 3.6 MMOL/L (ref 3.6–5.5)
RBC # BLD AUTO: 4.27 M/UL (ref 4.7–6.1)
SIGNIFICANT IND 70042: NORMAL
SITE SITE: NORMAL
SODIUM SERPL-SCNC: 136 MMOL/L (ref 135–145)
SOURCE SOURCE: NORMAL
WBC # BLD AUTO: 9.7 K/UL (ref 4.8–10.8)

## 2018-04-02 PROCEDURE — 700111 HCHG RX REV CODE 636 W/ 250 OVERRIDE (IP): Performed by: STUDENT IN AN ORGANIZED HEALTH CARE EDUCATION/TRAINING PROGRAM

## 2018-04-02 PROCEDURE — A9270 NON-COVERED ITEM OR SERVICE: HCPCS

## 2018-04-02 PROCEDURE — A9270 NON-COVERED ITEM OR SERVICE: HCPCS | Performed by: STUDENT IN AN ORGANIZED HEALTH CARE EDUCATION/TRAINING PROGRAM

## 2018-04-02 PROCEDURE — A9270 NON-COVERED ITEM OR SERVICE: HCPCS | Performed by: INTERNAL MEDICINE

## 2018-04-02 PROCEDURE — 700101 HCHG RX REV CODE 250

## 2018-04-02 PROCEDURE — 88304 TISSUE EXAM BY PATHOLOGIST: CPT

## 2018-04-02 PROCEDURE — 700111 HCHG RX REV CODE 636 W/ 250 OVERRIDE (IP): Performed by: INTERNAL MEDICINE

## 2018-04-02 PROCEDURE — 160009 HCHG ANES TIME/MIN: Performed by: UROLOGY

## 2018-04-02 PROCEDURE — 87075 CULTR BACTERIA EXCEPT BLOOD: CPT

## 2018-04-02 PROCEDURE — 700102 HCHG RX REV CODE 250 W/ 637 OVERRIDE(OP)

## 2018-04-02 PROCEDURE — 99233 SBSQ HOSP IP/OBS HIGH 50: CPT | Performed by: INTERNAL MEDICINE

## 2018-04-02 PROCEDURE — 501488 HCHG SUCTION CANN, WOUNDVAC TRAC: Performed by: UROLOGY

## 2018-04-02 PROCEDURE — 87205 SMEAR GRAM STAIN: CPT

## 2018-04-02 PROCEDURE — 80069 RENAL FUNCTION PANEL: CPT

## 2018-04-02 PROCEDURE — 160038 HCHG SURGERY MINUTES - EA ADDL 1 MIN LEVEL 2: Performed by: UROLOGY

## 2018-04-02 PROCEDURE — 770006 HCHG ROOM/CARE - MED/SURG/GYN SEMI*

## 2018-04-02 PROCEDURE — 76870 US EXAM SCROTUM: CPT

## 2018-04-02 PROCEDURE — 700111 HCHG RX REV CODE 636 W/ 250 OVERRIDE (IP)

## 2018-04-02 PROCEDURE — 36415 COLL VENOUS BLD VENIPUNCTURE: CPT

## 2018-04-02 PROCEDURE — A6550 NEG PRES WOUND THER DRSG SET: HCPCS | Performed by: UROLOGY

## 2018-04-02 PROCEDURE — 700102 HCHG RX REV CODE 250 W/ 637 OVERRIDE(OP): Performed by: INTERNAL MEDICINE

## 2018-04-02 PROCEDURE — 87070 CULTURE OTHR SPECIMN AEROBIC: CPT

## 2018-04-02 PROCEDURE — 700105 HCHG RX REV CODE 258: Performed by: STUDENT IN AN ORGANIZED HEALTH CARE EDUCATION/TRAINING PROGRAM

## 2018-04-02 PROCEDURE — 160048 HCHG OR STATISTICAL LEVEL 1-5: Performed by: UROLOGY

## 2018-04-02 PROCEDURE — 700102 HCHG RX REV CODE 250 W/ 637 OVERRIDE(OP): Performed by: STUDENT IN AN ORGANIZED HEALTH CARE EDUCATION/TRAINING PROGRAM

## 2018-04-02 PROCEDURE — 160027 HCHG SURGERY MINUTES - 1ST 30 MINS LEVEL 2: Performed by: UROLOGY

## 2018-04-02 PROCEDURE — 85025 COMPLETE CBC W/AUTO DIFF WBC: CPT

## 2018-04-02 PROCEDURE — 0VB50ZZ EXCISION OF SCROTUM, OPEN APPROACH: ICD-10-PCS | Performed by: UROLOGY

## 2018-04-02 PROCEDURE — 160035 HCHG PACU - 1ST 60 MINS PHASE I: Performed by: UROLOGY

## 2018-04-02 PROCEDURE — 160002 HCHG RECOVERY MINUTES (STAT): Performed by: UROLOGY

## 2018-04-02 RX ORDER — MEPERIDINE HYDROCHLORIDE 25 MG/ML
INJECTION INTRAMUSCULAR; INTRAVENOUS; SUBCUTANEOUS
Status: COMPLETED
Start: 2018-04-02 | End: 2018-04-02

## 2018-04-02 RX ORDER — KETOROLAC TROMETHAMINE 30 MG/ML
15 INJECTION, SOLUTION INTRAMUSCULAR; INTRAVENOUS EVERY 6 HOURS PRN
Status: DISCONTINUED | OUTPATIENT
Start: 2018-04-02 | End: 2018-04-04 | Stop reason: HOSPADM

## 2018-04-02 RX ORDER — OXYCODONE HCL 5 MG/5 ML
SOLUTION, ORAL ORAL
Status: COMPLETED
Start: 2018-04-02 | End: 2018-04-02

## 2018-04-02 RX ADMIN — PIPERACILLIN AND TAZOBACTAM 3.38 G: 3; .375 INJECTION, POWDER, FOR SOLUTION INTRAVENOUS at 04:36

## 2018-04-02 RX ADMIN — MEPERIDINE HYDROCHLORIDE 12.5 MG: 25 INJECTION INTRAMUSCULAR; INTRAVENOUS; SUBCUTANEOUS at 22:40

## 2018-04-02 RX ADMIN — KETOROLAC TROMETHAMINE 15 MG: 30 INJECTION, SOLUTION INTRAMUSCULAR at 14:10

## 2018-04-02 RX ADMIN — PIPERACILLIN AND TAZOBACTAM 3.38 G: 3; .375 INJECTION, POWDER, FOR SOLUTION INTRAVENOUS at 13:06

## 2018-04-02 RX ADMIN — LINEZOLID 600 MG: 600 TABLET, FILM COATED ORAL at 08:28

## 2018-04-02 RX ADMIN — OXYCODONE HYDROCHLORIDE 5 MG: 5 SOLUTION ORAL at 22:41

## 2018-04-02 RX ADMIN — LOSARTAN POTASSIUM 100 MG: 25 TABLET, FILM COATED ORAL at 08:28

## 2018-04-02 RX ADMIN — LINEZOLID 600 MG: 600 TABLET, FILM COATED ORAL at 23:21

## 2018-04-02 RX ADMIN — PIPERACILLIN AND TAZOBACTAM 3.38 G: 3; .375 INJECTION, POWDER, FOR SOLUTION INTRAVENOUS at 20:14

## 2018-04-02 ASSESSMENT — ENCOUNTER SYMPTOMS
NEUROLOGICAL NEGATIVE: 1
DIZZINESS: 0
FEVER: 0
BLURRED VISION: 0
PSYCHIATRIC NEGATIVE: 1
SHORTNESS OF BREATH: 0
NERVOUS/ANXIOUS: 1
GASTROINTESTINAL NEGATIVE: 1
NAUSEA: 0
CONSTITUTIONAL NEGATIVE: 1
CARDIOVASCULAR NEGATIVE: 1
VOMITING: 0
ABDOMINAL PAIN: 0
MUSCULOSKELETAL NEGATIVE: 1
RESPIRATORY NEGATIVE: 1
DEPRESSION: 0
CHILLS: 0
EYES NEGATIVE: 1

## 2018-04-02 ASSESSMENT — PAIN SCALES - GENERAL
PAINLEVEL_OUTOF10: 0
PAINLEVEL_OUTOF10: 4
PAINLEVEL_OUTOF10: 4
PAINLEVEL_OUTOF10: 2
PAINLEVEL_OUTOF10: 0
PAINLEVEL_OUTOF10: 2

## 2018-04-02 NOTE — CARE PLAN
Problem: Pain Management  Goal: Pain level will decrease to patient's comfort goal  Outcome: PROGRESSING AS EXPECTED  Denies discomfort most of the time.    Problem: Discharge Barriers/Planning  Goal: Patient's continuum of care needs will be met  Outcome: PROGRESSING AS EXPECTED  Awaiting decisions about the plan to go forward.

## 2018-04-02 NOTE — PROGRESS NOTES
No change from morning assessment except denies need for pain medication even after small procedure today.  Gait steady, voiding in toilet without problems.

## 2018-04-02 NOTE — PROGRESS NOTES
"Infectious Disease Progress Note    Author: Angela Rico M.D. Date & Time of service: 2018  1:45 PM    Chief Complaint:  Scrotal cellulitis      Interval History:  63 y.o. male admitted 3/29/2018 with worsening testicular pain and swelling  3/31 Temp 100.2 WBC 12.4 persistent pain, erythema, swelling scrotum suprapubic erythema resolved   Temp 100.4, WBC 13 agitated and irritable-angry about \"grapefruit between his legs\", in the hospital \"too long\", wants to know why was not on abx after surgery, why abx \"have not worked yet\" etc    AF WBC 9.7  feels better after drainage. Denies SE abx    Labs Reviewed, Medications Reviewed, Radiology Reviewed and Wound Reviewed.    Review of Systems:  Review of Systems   Constitutional: Negative for chills and fever.   Gastrointestinal: Negative for nausea and vomiting.   Genitourinary:        Pain in scrotum   Psychiatric/Behavioral: The patient is nervous/anxious.        Hemodynamics:  Temp (24hrs), Av.8 °C (98.3 °F), Min:36.5 °C (97.7 °F), Max:37.3 °C (99.1 °F)  Temperature: 37.3 °C (99.1 °F)  Pulse  Av.4  Min: 62  Max: 88   Blood Pressure : 127/63       Physical Exam:  Physical Exam   Constitutional: He is oriented to person, place, and time. He appears well-developed. No distress.   HENT:   Head: Normocephalic and atraumatic.   Eyes: EOM are normal. Pupils are equal, round, and reactive to light.   Neck: Neck supple.   Cardiovascular: Normal rate.    Pulmonary/Chest: Effort normal. No respiratory distress.   Abdominal: Soft. He exhibits no distension.   Genitourinary:   Genitourinary Comments: Decreased edema and erythema scrotum  Drainage from surgical site   Musculoskeletal: He exhibits no edema.   Neurological: He is alert and oriented to person, place, and time.   Skin: There is erythema.   Nursing note and vitals reviewed.      Meds:    Current Facility-Administered Medications:   •  ketorolac  •  linezolid  •  losartan  •  senna-docusate " **AND** polyethylene glycol/lytes **AND** magnesium hydroxide **AND** bisacodyl  •  ondansetron  •  acetaminophen  •  ibuprofen  •  [COMPLETED] piperacillin-tazobactam **AND** piperacillin-tazobactam    Labs:  Recent Labs      03/31/18 0411 04/01/18 0446 04/02/18 0437   WBC  12.4*  13.4*  9.7   RBC  4.45*  4.43*  4.27*   HEMOGLOBIN  13.8*  13.7*  13.2*   HEMATOCRIT  39.8*  39.6*  37.8*   MCV  89.4  89.4  88.5   MCH  31.0  30.9  30.9   RDW  40.6  39.4  39.5   PLATELETCT  201  199  225   MPV  11.4  11.1  10.9   NEUTSPOLYS  69.40  67.40  58.80   LYMPHOCYTES  20.20*  23.50  31.20   MONOCYTES  8.60  7.80  8.10   EOSINOPHILS  0.20  0.40  1.10   BASOPHILS  0.20  0.30  0.50     Recent Labs      03/31/18 0411 04/01/18 0446 04/02/18   0437   SODIUM  135  138  136   POTASSIUM  3.8  3.5*  3.6   CHLORIDE  103  107  105   CO2  23  25  24   GLUCOSE  107*  100*  93   BUN  14  13  14     Recent Labs      03/31/18 0411 04/01/18 0446 04/02/18   0437   ALBUMIN  2.8*  2.6*  2.5*   CREATININE  0.94  0.89  0.91       Imaging:  [id number]    Result Date: 3/31/2018  3/31/2018 1:04 PM HISTORY/REASON FOR EXAM: Swelling. TECHNIQUE/EXAM DESCRIPTION: Real-time sonography of the scrotum was performed with gray-scale, color and duplex Doppler imaging. COMPARISON: 3/29/2018 FINDINGS: The testes are homogeneous in echotexture and low-resistive waveforms are confirmed bilaterally. No intratesticular solid mass is seen. The right testis measures 4.10 cm x 2.13 cm x 2.45 cm. The left testis measures  4.84 cm x 2.78 cm x 4.14 cm. Again noted is a significantly complicated right hydrocele. No associated significant hyperemia seen. There is a small left intratesticular cyst measuring 6.5 x 5 x 6.9 mm. There is a small complicated left hydrocele.     Large complicated right hydrocele is again noted. This may be post hemorrhagic. No hyperemia is identified to indicate superinfection but this is not entirely excluded. Small  mildly complicated left hydrocele is again seen. Small intratesticular cyst is again seen on the left. Mildly dampened waveform is again noted on the right. Findings are not significantly changed compared to the prior examination.    [id number]    Result Date: 3/29/2018  3/29/2018 8:45 PM HISTORY/REASON FOR EXAM: Right hemiscrotum pain and swelling. Hydrocele drainage one month and 1 day ago . TECHNIQUE/EXAM DESCRIPTION: Real-time sonography of the scrotum was performed with gray-scale, color and duplex Doppler imaging. COMPARISON: None FINDINGS: Large complicated fluid collection fills the right hemiscrotum measuring 5 cm short axis with extensive septations but no nodularity or internal vascular flow. No adjacent hyperemia Small left hydrocele has mild internal echoes. No septations The testes are homogeneous in echotexture and low-resistive waveforms are confirmed bilaterally but the right waveform is slightly delayed in upstroke. No intratesticular mass is seen The right testis measures 4.03 cm x 1.87 cm x 2.69 cm. The left testis measures  5.55 cm x 2.70 cm x 3.39 cm. It contains a 6 mm simple cyst. There is no epididymal enlargement. The right epididymis is not distinctly seen No varicocele is detected. There is some inguinal edema and skin thickening on the right with mildly prominent nodes measuring up to 21 x 7 mm     Large complex right hydrocele most likely is hemorrhagic. No hyperemia to indicate superinfection although clinical correlation is advised Small mildly complicated left hydrocele without evidence of superinfection Vascular flow is confirmed to bilateral testes with low resistive waveforms. There is some asymmetry with mildly damped waveform on the right. Torsion will generally result in the opposite, a highly resistive waveform. No findings suspicious for left orchitis Simple left intratesticular cyst is of no significance      Micro:  Results     Procedure Component Value Units  "Date/Time    CULTURE WOUND W/ GRAM STAIN [[id number]]  (Abnormal) Collected:  04/01/18 1422    Order Status:  Completed Specimen:  Wound from Abscess Updated:  04/02/18 1310     Significant Indicator POS (POS)     Source WND     Site Scrotal Abscess     Culture Result Wound -- (A)     Gram Stain Result Many WBCs.  Few Gram positive cocci.       Culture Result Wound Staphylococcus aureus  Moderate growth   (A)    Narrative:       Collected By:[id number] NESS PATEL  Right scrotum    GRAM STAIN [[id number]] Collected:  04/01/18 1422    Order Status:  Completed Specimen:  Wound Updated:  04/02/18 0636     Significant Indicator .     Source WND     Site Scrotal Abscess     Gram Stain Result Many WBCs.  Few Gram positive cocci.      Narrative:       Collected By:[id number] NESS PATEL  Right scrotum    URINE CULTURE(NEW) [[id number]] Collected:  03/29/18 2140    Order Status:  Completed Specimen:  Urine Updated:  04/01/18 0728     Significant Indicator NEG     Source UR     Site --     Urine Culture No growth at 48 hours.    Narrative:       Indication for culture:->Emergency Room Patient    BLOOD CULTURE [[id number]] Collected:  03/29/18 2035    Order Status:  Completed Specimen:  Blood from Peripheral Updated:  03/30/18 0715     Significant Indicator NEG     Source BLD     Site PERIPHERAL     Blood Culture No Growth    Note: Blood cultures are incubated for 5 days and  are monitored continuously.Positive blood cultures  are called to the RN and reported as soon as  they are identified.    Blood culture testing and Gram stain, if indicated, are  performed at [address].  Positive blood cultures are  sent to [facility], [address] [place] [address], for organism identification and  susceptibility testing.      Narrative:       1 of 2 for Blood Culture x 2 sites order. Per Hospital  Policy: Only change Specimen Src: to \"Line\" if specified " "by  physician order.    BLOOD CULTURE [[id number]] Collected:  03/29/18 2042    Order Status:  Completed Specimen:  Blood from Peripheral Updated:  03/30/18 0715     Significant Indicator NEG     Source BLD     Site PERIPHERAL     Blood Culture No Growth    Note: Blood cultures are incubated for 5 days and  are monitored continuously.Positive blood cultures  are called to the RN and reported as soon as  they are identified.    Blood culture testing and Gram stain, if indicated, are  performed at [facility], [address] [place] [address].  Positive blood cultures are  sent to [facility], [address] [place] [address], for organism identification and  susceptibility testing.      Narrative:       2 of 2 blood culture x2  Sites order. Per Hospital Policy:  Only change Specimen Src: to \"Line\" if specified by physician  order.    URINALYSIS [[id number]]  (Abnormal) Collected:  03/29/18 2140    Order Status:  Completed Specimen:  Urine Updated:  03/29/18 2143     Color Yellow     Character Clear     Specific Gravity 1.020     Ph 5.5     Glucose Negative mg/dL      Ketones 15 (A) mg/dL      Protein Negative mg/dL      Bilirubin Negative     Nitrite Negative     Leukocyte Esterase Negative     Occult Blood Negative     Micro Urine Req see below     Comment: Microscopic examination not performed when specimen is clear  and chemically negative for protein, blood, leukocyte esterase  and nitrite.         Narrative:       Indication for culture:->Emergency Room Patient          Assessment:  Active Hospital Problems    Diagnosis   • Cellulitis [L03.90]   • Hydrocele [N43.3]   • Hyponatremia [E87.1]   • Hypokalemia [E87.6]   • Leukocytosis [D72.829]   • Essential (primary) hypertension [I10]       Plan:  Scrotal cellulitis with abscess, new  Afebrile  Resolved leukocytosis  S/p I and D on 4/1-Cxs SA  Blood cxs neg  Continue current abx for now  Anticipate dc Zosyn " tomorrow  Urology following  Monitor closely    Discussed with internal medicine Dr Ashraf and RN

## 2018-04-02 NOTE — PROGRESS NOTES
[facility] Hospitalist Progress Note    Date of Service: 2018    Chief Complaint  63 y.o. male admitted 3/29/2018 with worsening testicular pain and swelling.  The patient was sent from urology clinic to this hospital as the patient's urologist is concerned about possible infection.    Interval Problem Update  3/30:  The patient states that redness around his private part has reduced but his scrotum is still swollen.  3/31:  The patient states there was some bloody fluid draining from the right side of his scrotum this morning after using restroom.     :  The patient states that his right testicle is continuously draining.     :  The patient's right scrotum was drained by Dr. Tran yesterday afternoon.  The patient states that his swelling is about the same.    Consultants/Specialty  Urologist  ID specialist: Dr. Tyson.    Disposition  Home.        Review of Systems   Constitutional: Negative for chills and fever.   Eyes: Negative for blurred vision.   Respiratory: Negative for shortness of breath.    Cardiovascular: Negative for chest pain.   Gastrointestinal: Negative for abdominal pain, nausea and vomiting.   Genitourinary: Negative for dysuria.   Neurological: Negative for dizziness.   Psychiatric/Behavioral: Negative for depression.      Physical Exam  Laboratory/Imaging   Hemodynamics  Temp (24hrs), Av.8 °C (98.3 °F), Min:36.5 °C (97.7 °F), Max:37.3 °C (99.1 °F)   Temperature: 37.3 °C (99.1 °F)  Pulse  Av.7  Min: 62  Max: 85    Blood Pressure : 113/62      Respiratory      Respiration: 18, Pulse Oximetry: 93 %             Fluids    Intake/Output Summary (Last 24 hours) at 18 0806  Last data filed at 18 0500   Gross per 24 hour   Intake             1900 ml   Output                0 ml   Net             1900 ml       Nutrition  Orders Placed This Encounter   Procedures   • DIET ORDER     Standing Status:   Standing     Number of Occurrences:   1     Order Specific Question:   Diet:      Answer:   Cardiac [6]     Physical Exam   Constitutional: He is oriented to person, place, and time. He appears well-developed and well-nourished. No distress.   HENT:   Head: Normocephalic and atraumatic.   Mouth/Throat: Oropharynx is clear and moist. No oropharyngeal exudate.   Eyes: Pupils are equal, round, and reactive to light. Right eye exhibits no discharge. Left eye exhibits no discharge. No scleral icterus.   Neck: Neck supple. No JVD present. No thyromegaly present.   Cardiovascular: Normal rate and regular rhythm.    No murmur heard.  Pulmonary/Chest: Effort normal and breath sounds normal. No respiratory distress.   Abdominal: Soft. Bowel sounds are normal. He exhibits no distension.   Genitourinary: No penile tenderness.   Genitourinary Comments: scrotum is erythematous and swollen. Right side is very firm to touch.  There is wound packing inserted into the scrotal sac.  Left upper pole is soft to touch.  Left lower pole is firm. Wound dressing is soaked with clear yellow drainage.   Musculoskeletal: Normal range of motion. He exhibits no edema.   Neurological: He is alert and oriented to person, place, and time.   Skin: Skin is warm and dry. He is not diaphoretic. No erythema.   Psychiatric: He has a normal mood and affect.       Recent Labs      03/31/18 0411 04/01/18 0446 04/02/18 0437   WBC  12.4*  13.4*  9.7   RBC  4.45*  4.43*  4.27*   HEMOGLOBIN  13.8*  13.7*  13.2*   HEMATOCRIT  39.8*  39.6*  37.8*   MCV  89.4  89.4  88.5   MCH  31.0  30.9  30.9   MCHC  34.7  34.6  34.9   RDW  40.6  39.4  39.5   PLATELETCT  201  199  225   MPV  11.4  11.1  10.9     Recent Labs      03/31/18   0411  04/01/18 0446 04/02/18   0437   SODIUM  135  138  136   POTASSIUM  3.8  3.5*  3.6   CHLORIDE  103  107  105   CO2  23  25  24   GLUCOSE  107*  100*  93   BUN  14  13  14   CREATININE  0.94  0.89  0.91   CALCIUM  8.5  8.5  8.4                      Assessment/Plan     * Cellulitis- (present on admission)    Assessment & Plan    Scrotal cellulitis/abscess. Wbc improved significant after bedside drainage yesterday.  Fluid collected and sent for culture.  Preliminary culture indicates staph aureus.   Continue zosyn and zyvox per ID.   Final antibiotic to be determined once final culture result is available.        Hydrocele- (present on admission)   Assessment & Plan    Repeat ultrasound results reviewed.  Discussed with Dr. Mac this afternoon.  Anticipate taking the patient to OR for I&D as there is multiple loculated fluid and hematoma seen on ultrasound.  The patient may need a wound vac post surgery.        Hypokalemia   Assessment & Plan    Replete and monitor. magnesium level is adequate.        Hyponatremia   Assessment & Plan    Sodium level improved.  Continue to monitor.        Leukocytosis- (present on admission)   Assessment & Plan    Wbc decreased dramatically today. May be secondary to drainage of abscess. Continue IV antibiotics.          Essential (primary) hypertension- (present on admission)   Assessment & Plan    Will continue the patient's home medication and monitor.          Quality-Core Measures   Reviewed items::  Labs reviewed and Medications reviewed  Shaw catheter::  No Shaw  DVT prophylaxis - mechanical:  SCDs

## 2018-04-03 LAB
ALBUMIN SERPL BCP-MCNC: 2.9 G/DL (ref 3.2–4.9)
BACTERIA BLD CULT: NORMAL
BACTERIA BLD CULT: NORMAL
BACTERIA WND AEROBE CULT: ABNORMAL
BACTERIA WND AEROBE CULT: ABNORMAL
BASOPHILS # BLD AUTO: 0.3 % (ref 0–1.8)
BASOPHILS # BLD: 0.03 K/UL (ref 0–0.12)
BUN SERPL-MCNC: 13 MG/DL (ref 8–22)
CALCIUM SERPL-MCNC: 8.4 MG/DL (ref 8.4–10.2)
CHLORIDE SERPL-SCNC: 105 MMOL/L (ref 96–112)
CO2 SERPL-SCNC: 24 MMOL/L (ref 20–33)
CREAT SERPL-MCNC: 0.85 MG/DL (ref 0.5–1.4)
EOSINOPHIL # BLD AUTO: 0.12 K/UL (ref 0–0.51)
EOSINOPHIL NFR BLD: 1.3 % (ref 0–6.9)
ERYTHROCYTE [DISTWIDTH] IN BLOOD BY AUTOMATED COUNT: 38.7 FL (ref 35.9–50)
GLUCOSE SERPL-MCNC: 90 MG/DL (ref 65–99)
GRAM STN SPEC: ABNORMAL
GRAM STN SPEC: NORMAL
HCT VFR BLD AUTO: 37.7 % (ref 42–52)
HGB BLD-MCNC: 13 G/DL (ref 14–18)
IMM GRANULOCYTES # BLD AUTO: 0.03 K/UL (ref 0–0.11)
IMM GRANULOCYTES NFR BLD AUTO: 0.3 % (ref 0–0.9)
INR PPP: 1.17 (ref 0.87–1.13)
LYMPHOCYTES # BLD AUTO: 3 K/UL (ref 1–4.8)
LYMPHOCYTES NFR BLD: 32.9 % (ref 22–41)
MCH RBC QN AUTO: 30.8 PG (ref 27–33)
MCHC RBC AUTO-ENTMCNC: 34.5 G/DL (ref 33.7–35.3)
MCV RBC AUTO: 89.3 FL (ref 81.4–97.8)
MONOCYTES # BLD AUTO: 0.74 K/UL (ref 0–0.85)
MONOCYTES NFR BLD AUTO: 8.1 % (ref 0–13.4)
NEUTROPHILS # BLD AUTO: 5.19 K/UL (ref 1.82–7.42)
NEUTROPHILS NFR BLD: 57.1 % (ref 44–72)
NRBC # BLD AUTO: 0 K/UL
NRBC BLD-RTO: 0 /100 WBC
PATHOLOGY CONSULT NOTE: NORMAL
PHOSPHATE SERPL-MCNC: 3.9 MG/DL (ref 2.5–4.5)
PLATELET # BLD AUTO: 229 K/UL (ref 164–446)
PMV BLD AUTO: 10.5 FL (ref 9–12.9)
POTASSIUM SERPL-SCNC: 3.6 MMOL/L (ref 3.6–5.5)
PROTHROMBIN TIME: 14.8 SEC (ref 12–14.6)
RBC # BLD AUTO: 4.22 M/UL (ref 4.7–6.1)
SIGNIFICANT IND 70042: ABNORMAL
SIGNIFICANT IND 70042: NORMAL
SITE SITE: ABNORMAL
SITE SITE: NORMAL
SODIUM SERPL-SCNC: 137 MMOL/L (ref 135–145)
SOURCE SOURCE: ABNORMAL
SOURCE SOURCE: NORMAL
WBC # BLD AUTO: 9.1 K/UL (ref 4.8–10.8)

## 2018-04-03 PROCEDURE — 85610 PROTHROMBIN TIME: CPT

## 2018-04-03 PROCEDURE — 700102 HCHG RX REV CODE 250 W/ 637 OVERRIDE(OP): Performed by: STUDENT IN AN ORGANIZED HEALTH CARE EDUCATION/TRAINING PROGRAM

## 2018-04-03 PROCEDURE — A9270 NON-COVERED ITEM OR SERVICE: HCPCS | Performed by: STUDENT IN AN ORGANIZED HEALTH CARE EDUCATION/TRAINING PROGRAM

## 2018-04-03 PROCEDURE — 770006 HCHG ROOM/CARE - MED/SURG/GYN SEMI*

## 2018-04-03 PROCEDURE — 99233 SBSQ HOSP IP/OBS HIGH 50: CPT | Performed by: HOSPITALIST

## 2018-04-03 PROCEDURE — 700102 HCHG RX REV CODE 250 W/ 637 OVERRIDE(OP): Performed by: INTERNAL MEDICINE

## 2018-04-03 PROCEDURE — 700105 HCHG RX REV CODE 258: Performed by: STUDENT IN AN ORGANIZED HEALTH CARE EDUCATION/TRAINING PROGRAM

## 2018-04-03 PROCEDURE — 36415 COLL VENOUS BLD VENIPUNCTURE: CPT

## 2018-04-03 PROCEDURE — A9270 NON-COVERED ITEM OR SERVICE: HCPCS | Performed by: INTERNAL MEDICINE

## 2018-04-03 PROCEDURE — 85025 COMPLETE CBC W/AUTO DIFF WBC: CPT

## 2018-04-03 PROCEDURE — 700111 HCHG RX REV CODE 636 W/ 250 OVERRIDE (IP): Performed by: STUDENT IN AN ORGANIZED HEALTH CARE EDUCATION/TRAINING PROGRAM

## 2018-04-03 PROCEDURE — 80069 RENAL FUNCTION PANEL: CPT

## 2018-04-03 RX ORDER — OXYCODONE HYDROCHLORIDE AND ACETAMINOPHEN 5; 325 MG/1; MG/1
1-2 TABLET ORAL EVERY 6 HOURS PRN
Status: DISCONTINUED | OUTPATIENT
Start: 2018-04-03 | End: 2018-04-04 | Stop reason: HOSPADM

## 2018-04-03 RX ADMIN — LINEZOLID 600 MG: 600 TABLET, FILM COATED ORAL at 09:34

## 2018-04-03 RX ADMIN — LOSARTAN POTASSIUM 100 MG: 25 TABLET, FILM COATED ORAL at 09:35

## 2018-04-03 RX ADMIN — LINEZOLID 600 MG: 600 TABLET, FILM COATED ORAL at 20:52

## 2018-04-03 RX ADMIN — PIPERACILLIN AND TAZOBACTAM 3.38 G: 3; .375 INJECTION, POWDER, FOR SOLUTION INTRAVENOUS at 05:27

## 2018-04-03 ASSESSMENT — ENCOUNTER SYMPTOMS
VOMITING: 0
BLURRED VISION: 0
DEPRESSION: 0
NAUSEA: 0
DIZZINESS: 0
FEVER: 0
CHILLS: 0
SHORTNESS OF BREATH: 0
ABDOMINAL PAIN: 0

## 2018-04-03 ASSESSMENT — PAIN SCALES - GENERAL
PAINLEVEL_OUTOF10: 0
PAINLEVEL_OUTOF10: 0
PAINLEVEL_OUTOF10: 2
PAINLEVEL_OUTOF10: 0

## 2018-04-03 NOTE — CARE PLAN
Problem: Safety  Goal: Will remain free from injury    Intervention: Provide assistance with mobility  Pt mobility assessed at beginning of shift, pt requires standby assist to help with wound vac, steady.  Fall precautions in place, non-slip socks on, bed in lowest, locked position and call light is within reach.  Pt educated to call for assistance and verbalizes understanding.       Problem: Infection  Goal: Will remain free from infection    Intervention: Assess signs and symptoms of infection  Pt monitored for signs and symptoms of infection. Vitals and labs assessed and monitored for signs of infection. Proper hand hygiene performed prior to entering and exiting pt's room. IV abx course completed, pt is on PO abx. Pt educated on proper hand hygiene.

## 2018-04-03 NOTE — OR NURSING
2058: Patient allergies and NPO status verified, belongings left in room except x 2 rings handed to spouse. Patient verbalizes understanding of pain scale, expected course of stay and plan of care. Surgical site verified with patient. IV access confirmed. Sequentials placed on legs. Disposable underwear with peripad in place, no visible drainage.

## 2018-04-03 NOTE — PROGRESS NOTES
Surgical Progress Note    Author: Hi Mac Date & Time created: 2018   9:19 PM     Interval Events:  Hospital day # 5. Patient denies fever or chills but has drainage of the wound on the right hemiscrotum  Review of Systems   Constitutional: Negative.    HENT: Negative.    Eyes: Negative.    Respiratory: Negative.    Cardiovascular: Negative.    Gastrointestinal: Negative.    Genitourinary: Negative.    Musculoskeletal: Negative.    Skin: Negative.    Neurological: Negative.    Endo/Heme/Allergies: Negative.    Psychiatric/Behavioral: Negative.      Hemodynamics:  Temp (24hrs), Av.9 °C (98.4 °F), Min:36.6 °C (97.9 °F), Max:37.3 °C (99.1 °F)  Temperature: 36.6 °C (97.9 °F)  Pulse  Av  Min: 62  Max: 88   Blood Pressure : 128/79     Respiratory:    Respiration: 18, Pulse Oximetry: 96 %           Neuro:  GCS       Fluids:    Intake/Output Summary (Last 24 hours) at 18 2119  Last data filed at 18 1500   Gross per 24 hour   Intake             1400 ml   Output                0 ml   Net             1400 ml        Current Diet Order   Procedures   • DIET NPO     Physical Exam   Constitutional: He is oriented to person, place, and time. He appears well-developed and well-nourished.   HENT:   Head: Normocephalic and atraumatic.   Eyes: Conjunctivae are normal. Pupils are equal, round, and reactive to light.   Neck: Normal range of motion. Neck supple.   Cardiovascular: Normal rate, regular rhythm and normal heart sounds.    Pulmonary/Chest: Effort normal and breath sounds normal.   Abdominal: Soft. Bowel sounds are normal.   Genitourinary: Penis normal.   Genitourinary Comments: Right scrotum with packing in place. Packing was removed and with pressure the wound is draining purulent material. No cellulitis but breakdown of the skin at the old incision site.   Musculoskeletal: Normal range of motion.   Neurological: He is alert and oriented to person, place, and time.   Skin: Skin is warm.    Psychiatric: He has a normal mood and affect. His behavior is normal. Judgment and thought content normal.     Labs:  Recent Results (from the past 24 hour(s))   RENAL FUNCTION PANEL    Collection Time: 04/02/18  4:37 AM   Result Value Ref Range    Sodium 136 135 - 145 mmol/L    Potassium 3.6 3.6 - 5.5 mmol/L    Chloride 105 96 - 112 mmol/L    Co2 24 20 - 33 mmol/L    Glucose 93 65 - 99 mg/dL    Creatinine 0.91 0.50 - 1.40 mg/dL    Bun 14 8 - 22 mg/dL    Calcium 8.4 8.4 - 10.2 mg/dL    Phosphorus 4.1 2.5 - 4.5 mg/dL    Albumin 2.5 (L) 3.2 - 4.9 g/dL   CBC WITH DIFFERENTIAL    Collection Time: 04/02/18  4:37 AM   Result Value Ref Range    WBC 9.7 4.8 - 10.8 K/uL    RBC 4.27 (L) 4.70 - 6.10 M/uL    Hemoglobin 13.2 (L) 14.0 - 18.0 g/dL    Hematocrit 37.8 (L) 42.0 - 52.0 %    MCV 88.5 81.4 - 97.8 fL    MCH 30.9 27.0 - 33.0 pg    MCHC 34.9 33.7 - 35.3 g/dL    RDW 39.5 35.9 - 50.0 fL    Platelet Count 225 164 - 446 K/uL    MPV 10.9 9.0 - 12.9 fL    Neutrophils-Polys 58.80 44.00 - 72.00 %    Lymphocytes 31.20 22.00 - 41.00 %    Monocytes 8.10 0.00 - 13.40 %    Eosinophils 1.10 0.00 - 6.90 %    Basophils 0.50 0.00 - 1.80 %    Immature Granulocytes 0.30 0.00 - 0.90 %    Nucleated RBC 0.00 /100 WBC    Neutrophils (Absolute) 5.67 1.82 - 7.42 K/uL    Lymphs (Absolute) 3.01 1.00 - 4.80 K/uL    Monos (Absolute) 0.78 0.00 - 0.85 K/uL    Eos (Absolute) 0.11 0.00 - 0.51 K/uL    Baso (Absolute) 0.05 0.00 - 0.12 K/uL    Immature Granulocytes (abs) 0.03 0.00 - 0.11 K/uL    NRBC (Absolute) 0.00 K/uL   ESTIMATED GFR    Collection Time: 04/02/18  4:37 AM   Result Value Ref Range    GFR If African American >60 >60 mL/min/1.73 m 2    GFR If Non African American >60 >60 mL/min/1.73 m 2     Medical Decision Making, by Problem:  Active Hospital Problems    Diagnosis   • Cellulitis [L03.90]     Priority: High   • Hydrocele [N43.3]     Priority: Medium   • Hyponatremia [E87.1]   • Hypokalemia [E87.6]   • Leukocytosis [D72.829]   • Essential  (primary) hypertension [I10]     Plan  Discussed with the patient that the wound is still draining but the fluid has not decompressed completely. The sonogram reveals evidence of a right hematoma without decompression.  Patient is aware of options of local care versus open drainage with pulse irrigation and application of a wound vac.  All risks of the procedure were discussed as well as the benefits. Informed consent was given to me by Mr. Rosenberg to proceed with the procedures as discussed.  Quality Measures:  Quality-Core Measures   Reviewed items::  Labs reviewed, Medications reviewed and Radiology images reviewed      Discussed patient condition with RN, Patient and hospitalist

## 2018-04-03 NOTE — PROGRESS NOTES
2215 - Pt arrived to PACU via bed.  Report received from anesthesia & OR RN.  Airway patent.  Lung sounds clear.  Respirations spontaneous.  DRSG CDI, wound vac patent.  SCD's applied.  Denies pain, n/v at this time.    2230 - Tolerating PO well.    2235 - Wife updated on POC over phone.    2240 - Pt c/o increasing burning pain 4/10 in scrotal area.  Medicated per anesthesia orders.      2250 - Pt states pain tolerable at this time.      2253 - Report given to OFELIA Buck.      2301 - Pt tx to room w RN x2 without incident.

## 2018-04-03 NOTE — PROGRESS NOTES
No change from morning assessment except plans are for surgery tonight with Dr. Mac.  Patient aware of plan and NPO status.  Plan of care reviewed again and questions answered as needed.

## 2018-04-03 NOTE — PROGRESS NOTES
"Urology Progress Note    Post op Day # 1    1.  Scrotal incision and drainage of scrotal hematoma and abscess.  2.  Debridement of nonviable scrotal tissue.  3.  Wound VAC placement.     Post op-  Right scrotal abscess with necrotic nonviable   scrotal skin at the level of the old incision  Interval Events: None    S: No fevers, chills, nausea or vomiting. + flatus.    O:   Blood pressure 117/65, pulse 91, temperature 36.4 °C (97.6 °F), resp. rate 18, height 1.626 m (5' 4\"), weight 69.5 kg (153 lb 3.5 oz), SpO2 94 %.  Recent Labs      04/01/18 0446 04/02/18 0437 04/03/18   0511   SODIUM  138  136  137   POTASSIUM  3.5*  3.6  3.6   CHLORIDE  107  105  105   CO2  25  24  24   GLUCOSE  100*  93  90   BUN  13  14  13   CREATININE  0.89  0.91  0.85   CALCIUM  8.5  8.4  8.4     Recent Labs      04/01/18 0446 04/02/18 0437 04/03/18   0511   WBC  13.4*  9.7  9.1   RBC  4.43*  4.27*  4.22*   HEMOGLOBIN  13.7*  13.2*  13.0*   HEMATOCRIT  39.6*  37.8*  37.7*   MCV  89.4  88.5  89.3   MCH  30.9  30.9  30.8   MCHC  34.6  34.9  34.5   RDW  39.4  39.5  38.7   PLATELETCT  199  225  229   MPV  11.1  10.9  10.5         Intake/Output Summary (Last 24 hours) at 04/03/18 1521  Last data filed at 04/03/18 0800   Gross per 24 hour   Intake             1005 ml   Output               30 ml   Net              975 ml       Physical Exam   Constitutional: He is oriented to person, place, and time. He appears well-developed and well-nourished. No distress.   HENT:   Head: Normocephalic and atraumatic.   Mouth/Throat: Oropharynx is clear and moist. No oropharyngeal exudate.   Eyes: Pupils are equal, round, and reactive to light.   Neck: Neck supple. No JVD present. No thyromegaly present.   Cardiovascular: Normal rate and regular rhythm.    No murmur heard.  Pulmonary/Chest: Effort normal and breath sounds normal. No respiratory distress.   Abdominal: Soft. Bowel sounds are normal. He exhibits no distension.   Genitourinary: No " penile tenderness.   Genitourinary Comments: Wound vac in place to suction.     Musculoskeletal: Normal range of motion. He exhibits no edema.   Neurological: He is alert and oriented to person, place, and time.   Skin: Skin is warm and dry. He is not diaphoretic. No erythema.   Psychiatric: He has a normal mood and affect.   A/P:    Active Hospital Problems    Diagnosis   • Cellulitis [L03.90]     Priority: High   • Hydrocele [N43.3]     Priority: Medium   • Hyponatremia [E87.1]   • Hypokalemia [E87.6]   • Leukocytosis [D72.829]   • Essential (primary) hypertension [I10]       Stable.   Ambulate, IS.  Continue ABX  Wound care with wound dressing changes every other day.    Pt will need wound care therapy at home with continuation of wound vac at home    Discussed pt care plan with MD Mac, PT and RN    LADI Rodriguez

## 2018-04-03 NOTE — PROGRESS NOTES
Call back from Dr. Rico whom this RN informed about pts wound culture being positive for MRSA. No new orders received.

## 2018-04-03 NOTE — PROGRESS NOTES
[facility] Hospitalist Progress Note    Date of Service: 4/3/2018    Chief Complaint  63 y.o. male admitted 3/29/2018 with worsening testicular pain and swelling.  The patient was sent from urology clinic to this hospital as the patient's urologist is concerned about possible infection.    Interval Problem Update  3/30:  The patient states that redness around his private part has reduced but his scrotum is still swollen.  3/31:  The patient states there was some bloody fluid draining from the right side of his scrotum this morning after using restroom.     :  The patient states that his right testicle is continuously draining.     :  The patient's right scrotum was drained by Dr. Tran yesterday afternoon.  The patient states that his swelling is about the same.    4/3: patient seen this morning, doing well, shaving near sink. No pain. Has wound vac in place  Cultures showing MRSA -    Consultants/Specialty  Urologist  ID specialist: Dr. Tyson.    Disposition  Home.        Review of Systems   Constitutional: Negative for chills and fever.   Eyes: Negative for blurred vision.   Respiratory: Negative for shortness of breath.    Cardiovascular: Negative for chest pain.   Gastrointestinal: Negative for abdominal pain, nausea and vomiting.   Genitourinary: Negative for dysuria.   Neurological: Negative for dizziness.   Psychiatric/Behavioral: Negative for depression.      Physical Exam  Laboratory/Imaging   Hemodynamics  Temp (24hrs), Av.4 °C (97.5 °F), Min:36 °C (96.8 °F), Max:36.8 °C (98.2 °F)   Temperature: 36.4 °C (97.6 °F)  Pulse  Av.6  Min: 62  Max: 91 Heart Rate (Monitored): 74  Blood Pressure : 117/65, NIBP: 135/83      Respiratory      Respiration: 18, Pulse Oximetry: 94 %             Fluids    Intake/Output Summary (Last 24 hours) at 18 1326  Last data filed at 18 0800   Gross per 24 hour   Intake             1105 ml   Output               30 ml   Net             1075 ml        Nutrition  Orders Placed This Encounter   Procedures   • DIET ORDER     Standing Status:   Standing     Number of Occurrences:   1     Order Specific Question:   Diet:     Answer:   Regular [1]     Physical Exam   Constitutional: He is oriented to person, place, and time. He appears well-developed and well-nourished. No distress.   HENT:   Head: Normocephalic and atraumatic.   Mouth/Throat: Oropharynx is clear and moist. No oropharyngeal exudate.   Eyes: Pupils are equal, round, and reactive to light. Right eye exhibits no discharge. Left eye exhibits no discharge. No scleral icterus.   Neck: Neck supple. No JVD present. No thyromegaly present.   Cardiovascular: Normal rate and regular rhythm.    No murmur heard.  Pulmonary/Chest: Effort normal and breath sounds normal. No respiratory distress.   Abdominal: Soft. Bowel sounds are normal. He exhibits no distension.   Genitourinary: No penile tenderness.   Genitourinary Comments: Wound vac in place   Musculoskeletal: Normal range of motion. He exhibits no edema.   Neurological: He is alert and oriented to person, place, and time.   Skin: Skin is warm and dry. He is not diaphoretic. No erythema.   Psychiatric: He has a normal mood and affect.       Recent Labs      04/01/18   0446  04/02/18   0437  04/03/18   0511   WBC  13.4*  9.7  9.1   RBC  4.43*  4.27*  4.22*   HEMOGLOBIN  13.7*  13.2*  13.0*   HEMATOCRIT  39.6*  37.8*  37.7*   MCV  89.4  88.5  89.3   MCH  30.9  30.9  30.8   MCHC  34.6  34.9  34.5   RDW  39.4  39.5  38.7   PLATELETCT  199  225  229   MPV  11.1  10.9  10.5     Recent Labs      04/01/18   0446  04/02/18   0437  04/03/18   0511   SODIUM  138  136  137   POTASSIUM  3.5*  3.6  3.6   CHLORIDE  107  105  105   CO2  25  24  24   GLUCOSE  100*  93  90   BUN  13  14  13   CREATININE  0.89  0.91  0.85   CALCIUM  8.5  8.4  8.4     Recent Labs      04/03/18   0511   INR  1.17*                  Assessment/Plan     * Cellulitis- (present on admission)    Assessment & Plan    Scrotal cellulitis/abscess. S/p bedside I&D.  Fluid collected and sent for culture and growing MRSA.    Continue linezolid        Hydrocele- (present on admission)   Assessment & Plan    S/p I&D and wound vac placement now  Continue Abx  Wound care        Hypokalemia   Assessment & Plan    Replete and monitor. magnesium level is adequate.        Hyponatremia   Assessment & Plan    Sodium level improved.  Continue to monitor.        Leukocytosis- (present on admission)   Assessment & Plan    Resolved  Continue abx          Essential (primary) hypertension- (present on admission)   Assessment & Plan    Will continue the patient's home medication and monitor.          Quality-Core Measures   Reviewed items::  Labs reviewed and Medications reviewed  Shaw catheter::  No Shaw  DVT prophylaxis - mechanical:  SCDs      I spent a total of 35 minutes of time during this clinical encounter of which > 50% was devoted to counseling and coordinating care including review of records, pertinent lab data and studies, as well as discussing diagnostic evaluation and work up, planned therapeutic interventions and future disposition of care. Where indicated, the assessment and plan reflect discussion of patient with consultants, other healthcare providers, family members, and additional research needed to obtain further information in formulating the plan of care of this patient. This time includes no procedures or overlap with other providers.

## 2018-04-03 NOTE — PROGRESS NOTES
Bedside report completed. Assumed pt care. Pt A&O 4, resting in bed comfortably with no signs of labored breathing on RA. Pt is medical. Pt call light within reach, bed in low position, upper bed rails up, non skid socks in place. Pt denies any pain or other distress at this time. Patient was updated on the plan of care for the day. All fall precautions in place, pt has a wound vac to scrotum. Will continue to monitor.

## 2018-04-03 NOTE — PROGRESS NOTES
"Infectious Disease Progress Note    Author: Angela Rico M.D. Date & Time of service: 4/3/2018  3:16 PM    Chief Complaint:  Scrotal cellulitis      Interval History:  63 y.o. male admitted 3/29/2018 with worsening testicular pain and swelling  3/31 Temp 100.2 WBC 12.4 persistent pain, erythema, swelling scrotum suprapubic erythema resolved   Temp 100.4, WBC 13 agitated and irritable-angry about \"grapefruit between his legs\", in the hospital \"too long\", wants to know why was not on abx after surgery, why abx \"have not worked yet\" etc    AF WBC 9.7 feels better after drainage. Denies SE abx  4/3 AF WBC 9.1 s/p I and D last night-wants to know when can go home Tolerated well  Labs Reviewed, Medications Reviewed, Radiology Reviewed and Wound Reviewed.    Review of Systems:  Review of Systems   Constitutional: Negative for chills and fever.   Gastrointestinal: Negative for nausea and vomiting.   Genitourinary:        Pain in scrotum-controlled       Hemodynamics:  Temp (24hrs), Av.4 °C (97.5 °F), Min:36 °C (96.8 °F), Max:36.8 °C (98.2 °F)  Temperature: 36.4 °C (97.6 °F)  Pulse  Av.6  Min: 62  Max: 91Heart Rate (Monitored): 74  Blood Pressure : 117/65, NIBP: 135/83       Physical Exam:  Physical Exam   Constitutional: He is oriented to person, place, and time. He appears well-developed. No distress.   HENT:   Head: Normocephalic and atraumatic.   Eyes: EOM are normal. Pupils are equal, round, and reactive to light.   Neck: Neck supple.   Cardiovascular: Normal rate.    Pulmonary/Chest: Effort normal. No respiratory distress.   Abdominal: Soft. He exhibits no distension.   Genitourinary:   Genitourinary Comments: Dressed   Musculoskeletal: He exhibits no edema.   Neurological: He is alert and oriented to person, place, and time.   Skin: There is erythema.   Nursing note and vitals reviewed.      Meds:    Current Facility-Administered Medications:   •  oxyCODONE-acetaminophen  •  ketorolac  •  " linezolid  •  losartan  •  senna-docusate **AND** polyethylene glycol/lytes **AND** magnesium hydroxide **AND** bisacodyl  •  ondansetron  •  acetaminophen  •  ibuprofen    Labs:  Recent Labs      04/01/18 0446 04/02/18 0437 04/03/18   0511   WBC  13.4*  9.7  9.1   RBC  4.43*  4.27*  4.22*   HEMOGLOBIN  13.7*  13.2*  13.0*   HEMATOCRIT  39.6*  37.8*  37.7*   MCV  89.4  88.5  89.3   MCH  30.9  30.9  30.8   RDW  39.4  39.5  38.7   PLATELETCT  199  225  229   MPV  11.1  10.9  10.5   NEUTSPOLYS  67.40  58.80  57.10   LYMPHOCYTES  23.50  31.20  32.90   MONOCYTES  7.80  8.10  8.10   EOSINOPHILS  0.40  1.10  1.30   BASOPHILS  0.30  0.50  0.30     Recent Labs      04/01/18 0446 04/02/18 0437 04/03/18   0511   SODIUM  138  136  137   POTASSIUM  3.5*  3.6  3.6   CHLORIDE  107  105  105   CO2  25  24  24   GLUCOSE  100*  93  90   BUN  13  14  13     Recent Labs      04/01/18 0446 04/02/18 0437 04/03/18   0511   ALBUMIN  2.6*  2.5*  2.9*   CREATININE  0.89  0.91  0.85       Imaging:  [id number]    Result Date: 3/31/2018  3/31/2018 1:04 PM HISTORY/REASON FOR EXAM: Swelling. TECHNIQUE/EXAM DESCRIPTION: Real-time sonography of the scrotum was performed with gray-scale, color and duplex Doppler imaging. COMPARISON: 3/29/2018 FINDINGS: The testes are homogeneous in echotexture and low-resistive waveforms are confirmed bilaterally. No intratesticular solid mass is seen. The right testis measures 4.10 cm x 2.13 cm x 2.45 cm. The left testis measures  4.84 cm x 2.78 cm x 4.14 cm. Again noted is a significantly complicated right hydrocele. No associated significant hyperemia seen. There is a small left intratesticular cyst measuring 6.5 x 5 x 6.9 mm. There is a small complicated left hydrocele.     Large complicated right hydrocele is again noted. This may be post hemorrhagic. No hyperemia is identified to indicate superinfection but this is not entirely excluded. Small mildly complicated left hydrocele is  again seen. Small intratesticular cyst is again seen on the left. Mildly dampened waveform is again noted on the right. Findings are not significantly changed compared to the prior examination.    [id number]    Result Date: 3/29/2018  3/29/2018 8:45 PM HISTORY/REASON FOR EXAM: Right hemiscrotum pain and swelling. Hydrocele drainage one month and 1 day ago . TECHNIQUE/EXAM DESCRIPTION: Real-time sonography of the scrotum was performed with gray-scale, color and duplex Doppler imaging. COMPARISON: None FINDINGS: Large complicated fluid collection fills the right hemiscrotum measuring 5 cm short axis with extensive septations but no nodularity or internal vascular flow. No adjacent hyperemia Small left hydrocele has mild internal echoes. No septations The testes are homogeneous in echotexture and low-resistive waveforms are confirmed bilaterally but the right waveform is slightly delayed in upstroke. No intratesticular mass is seen The right testis measures 4.03 cm x 1.87 cm x 2.69 cm. The left testis measures  5.55 cm x 2.70 cm x 3.39 cm. It contains a 6 mm simple cyst. There is no epididymal enlargement. The right epididymis is not distinctly seen No varicocele is detected. There is some inguinal edema and skin thickening on the right with mildly prominent nodes measuring up to 21 x 7 mm     Large complex right hydrocele most likely is hemorrhagic. No hyperemia to indicate superinfection although clinical correlation is advised Small mildly complicated left hydrocele without evidence of superinfection Vascular flow is confirmed to bilateral testes with low resistive waveforms. There is some asymmetry with mildly damped waveform on the right. Torsion will generally result in the opposite, a highly resistive waveform. No findings suspicious for left orchitis Simple left intratesticular cyst is of no significance      Micro:  Results     Procedure Component Value Units Date/Time    CULTURE WOUND W/ GRAM STAIN  [[id number]]  (Abnormal)  (Susceptibility) Collected:  04/01/18 1422    Order Status:  Completed Specimen:  Wound from Abscess Updated:  04/03/18 1136     Significant Indicator POS (POS)     Source WND     Site Scrotal Abscess     Culture Result Wound -- (A)     Gram Stain Result Many WBCs.  Few Gram positive cocci.       Culture Result Wound Methicillin Resistant Staphylococcus aureus  Moderate growth   (A)    Narrative:       CALL  F[facility]  [facility] tel. ,  CALLED  [facility] tel.  04/03/2018, 11:36, RB PERF. RESULTS CALLED  TO:[id number],RN;faxed to [facility]  Collected By:[id number] PATEL  Right scrotum    Culture & Susceptibility     METHICILLIN RESISTANT STAPHYLOCOCCUS AUREUS     Antibiotic Sensitivity Microscan Unit Status    Ampicillin/sulbactam Resistant <=8/4 mcg/mL Final    Clindamycin Sensitive <=0.5 mcg/mL Final    Daptomycin Sensitive <=0.5 mcg/mL Final    Erythromycin Sensitive <=0.5 mcg/mL Final    Moxifloxacin Sensitive <=0.5 mcg/mL Final    Oxacillin Resistant >2 mcg/mL Final    Penicillin Resistant >8 mcg/mL Final    Tetracycline Sensitive <=4 mcg/mL Final    Trimeth/Sulfa Sensitive <=0.5/9.5 mcg/mL Final    Vancomycin Sensitive 2 mcg/mL Final                       CULTURE WOUND W/ GRAM STAIN [[id number]] Collected:  04/02/18 2200    Order Status:  Completed Updated:  04/03/18 0021    ANAEROBIC CULTURE [[id number]] Collected:  04/02/18 2200    Order Status:  Completed Updated:  04/03/18 0021    GRAM STAIN [[id number]] Collected:  04/01/18 1422    Order Status:  Completed Specimen:  Wound Updated:  04/02/18 0636     Significant Indicator .     Source WND     Site Scrotal Abscess     Gram Stain Result Many WBCs.  Few Gram positive cocci.      Narrative:       Collected By:[id number] PATEL  Right scrotum    URINE CULTURE(NEW) [[id number]] Collected:  03/29/18 2140    Order Status:  Completed Specimen:  Urine Updated:  04/01/18 0728     Significant Indicator NEG     Source UR     Site --     Urine Culture No growth at 48  "hours.    Narrative:       Indication for culture:->Emergency Room Patient    BLOOD CULTURE [[id number]] Collected:  03/29/18 2035    Order Status:  Completed Specimen:  Blood from Peripheral Updated:  03/30/18 0715     Significant Indicator NEG     Source BLD     Site PERIPHERAL     Blood Culture No Growth    Note: Blood cultures are incubated for 5 days and  are monitored continuously.Positive blood cultures  are called to the RN and reported as soon as  they are identified.    Blood culture testing and Gram stain, if indicated, are  performed at [facility], [facility] [place].  Positive blood cultures are  sent to [facility], [address] [place] [address], for organism identification and  susceptibility testing.      Narrative:       1 of 2 for Blood Culture x 2 sites order. Per Hospital  Policy: Only change Specimen Src: to \"Line\" if specified by  physician order.    BLOOD CULTURE [[id number]] Collected:  03/29/18 2042    Order Status:  Completed Specimen:  Blood from Peripheral Updated:  03/30/18 0715     Significant Indicator NEG     Source BLD     Site PERIPHERAL     Blood Culture No Growth    Note: Blood cultures are incubated for 5 days and  are monitored continuously.Positive blood cultures  are called to the RN and reported as soon as  they are identified.    Blood culture testing and Gram stain, if indicated, are  performed at [facility], [facility] [place]  [place], [place].  Positive blood cultures are  sent to [facility], [address] [place] [address], for organism identification and  susceptibility testing.      Narrative:       2 of 2 blood culture x2  Sites order. Per Hospital Policy:  Only change Specimen Src: to \"Line\" if specified by physician  order.    URINALYSIS [[id number]]  (Abnormal) Collected:  03/29/18 2140    Order Status:  Completed Specimen:  Urine Updated:  03/29/18 2143     " Color Yellow     Character Clear     Specific Gravity 1.020     Ph 5.5     Glucose Negative mg/dL      Ketones 15 (A) mg/dL      Protein Negative mg/dL      Bilirubin Negative     Nitrite Negative     Leukocyte Esterase Negative     Occult Blood Negative     Micro Urine Req see below     Comment: Microscopic examination not performed when specimen is clear  and chemically negative for protein, blood, leukocyte esterase  and nitrite.         Narrative:       Indication for culture:->Emergency Room Patient          Assessment:  Active Hospital Problems    Diagnosis   • Cellulitis [L03.90]   • Hydrocele [N43.3]   • Hyponatremia [E87.1]   • Hypokalemia [E87.6]   • Leukocytosis [D72.829]   • Essential (primary) hypertension [I10]       Plan:  Scrotal cellulitis with abscess, new  Afebrile  Resolved leukocytosis  S/p I and D on 4/1-Cxs MRSA  S/p I and D with evacuation hematoma and necrotic tissue 4/2-f/u cxs  Blood cxs neg  DC'd Zosyn   Continue Zyvox   Stop date 4/15  Urology following  Monitor closely    Discussed with  RN

## 2018-04-03 NOTE — WOUND TEAM
Pt has VAC drsg to R side of scrotum. It was placed in OR on 4/2. Discussion with RN to ask urologist if wound team to change VAC drsg and if pt can have PRN pain medication for drsg changes. OFELIA Ayala will talk with MD either when in to see pt or make a TC. Wound team waiting for orders.

## 2018-04-03 NOTE — OP REPORT
DATE OF SERVICE:  04/02/2018    PREOPERATIVE DIAGNOSIS:  Right scrotal hematoma versus abscess.    PROCEDURE PERFORMED:  1.  Scrotal incision and drainage of scrotal hematoma and abscess.  2.  Debridement of nonviable scrotal tissue.  3.  Wound VAC placement.    POSTOPERATIVE DIAGNOSIS:  Right scrotal abscess with necrotic nonviable   scrotal skin at the level of the old incision.    SURGEON:  Hi Mac MD    ANESTHESIA:  Hi Alcantara MD    ANESTHESIOLOGIST:  Hi Alcantara MD    ANESTHESIA:  General laryngeal mask.    COMPLICATIONS:  None.    DRAINS:  Wound VAC was positioned in the right hemiscrotum.    SPECIMENS:  A. Aerobic cultures.  B.  Anaerobic cultures.  C.  Necrotic tissue pathology for permanent section.    INDICATIONS:  The patient is a pleasant 63-year-old gentleman with a history   of right hydrocelectomy and simultaneous left inguinal herniorrhaphy.  After   the hydrocele, he developed a swelling in the right hemiscrotum.  This was   aspirated in the office.  He then developed cellulitis.  He was admitted to   the medical service on vancomycin, had improvement in the cellulitis and on   Friday during consultation had evidence of what looked to be a contained   hematoma without evidence of abscess.  Over the weekend, he remained afebrile,   but on Sunday, his wound began to drain spontaneously, it was probed with a   Q-tip and packed by Dr. Tran.  A followup ultrasound was performed on   04/02/2018 which shows a multiloculated fluid collection adjacent to the   testicle, a viable testicle and some necrosis of the skin at the skin   incision.  I explained to the patient in the afternoon that I would recommend   surgical exploration with a pulse evacuation in the wound and application of   wound VAC to facilitate its healing.  The other option of conservative therapy   was not advised as I felt that ultimately he would need exploration,   irrigation and potential debridement of additional tissue.  Prior to  surgery,   we discussed the risk of procedure including but not limited to a risk of   postoperative persistent wound infection requiring secondary debridement,   potential risk of injury to adjacent structures including the testicle.    Certainly with the exploration, there is a risk of bleeding.  There is the   potential risk of postoperative pain, swelling, as well as perioperative risk   of deep vein thrombosis, pulmonary embolism, aspiration pneumonia, heart   attack, stroke and death.  Informed consent was given to me by the patient to   proceed.  He has marked that as right thigh with my initials and a letter Y.    DESCRIPTION IN DETAIL:  After informed consent was obtained, the patient was   brought to the operating room and placed in supine.  Dr. Alcantara performed a   general laryngeal mask in a balanced fashion and bilateral sequential   compression devices are in place and operational.  The operative area was   Betadine prepped and draped in usual sterile fashion.  A surgical time-out was   called.  All members of the operative team agree as the patient's name,   procedure to be performed and the fact that it is right side.  Without   objections, attention was directed towards procedure.    I began the procedure by examining the hemiscrotum it is tense.  It is   somewhat fluctuant posteriorly and there is necrotic tissue where the old   incision areas and that has worsened since he was evaluated in the afternoon   around 2:00 it now being 19:30 in the evening, there appears to be progression   of the skin infection and subsequently an elliptical incision was made with a   15 blade scalpel around the old surgical site.  This incision was carried   down with the Bovie with coagulation and cutting current and necrotic tissue   was noted full thickness in the scrotum at this level; however, the rest of   the scrotum appeared viable, but upon entering the right hemiscrotum   approximately 200 mL of old blood and  fluid were removed, some of which was   clearly infected.  There was appearance of a sterile abscess/hematoma.  This   was all removed and then the nonviable tissue adjacent to the testicle and   scrotum was debrided.  After debridement, hemostasis was obtained with   electrocautery and I proceeded to use 3 liters of pulse evacuation of high   pressure cleaning all of the tissues, make sure to clean up in the right   external ring area and the testicle appeared viable and at this point in time,   it was felt that the quality of the wound was such that a wound VAC could be   positioned.  Subsequently, a wound VAC was placed into the wound in a proper   position and the wound VAC was connected to suction with a good seal.  At the   end of the case, sponge, instruments, and needle counts were correct x2.  The   patient tolerated the procedure well without complication and appeared to have   a viable right testicle left in the hemiscrotum.       ____________________________________     MD MIKAYLA SOLANO / SHAKILA    DD:  04/02/2018 23:49:19  DT:  04/03/2018 00:18:25    D#:  [id number]  Job#:  [id number]

## 2018-04-03 NOTE — PROGRESS NOTES
Dr. Hanson notified face to face regarding pt positive culture for MRSA, Dr. Rico with ID paged, awaiting call back.

## 2018-04-03 NOTE — PROGRESS NOTES
Spoke with Irene Yeh NP with urology, requested pain medication order for dressing changes and a dressing change order per Wound Care RN request. NP Irene Yeh told this RN she will put those orders in.

## 2018-04-03 NOTE — CARE PLAN
Problem: Pain Management  Goal: Pain level will decrease to patient's comfort goal  Outcome: PROGRESSING AS EXPECTED    Intervention: Follow pain managment plan developed in collaboration with patient and Interdisciplinary Team  Pain assessment q4h or q2h after medication intervention.  Encourage patient to report pain, verbalize understanding. Medicate PRN      Problem: Infection  Goal: Will remain free from infection  Outcome: PROGRESSING AS EXPECTED    Intervention: Implement standard precautions and perform hand washing before and after patient contact  Hand washing every encounter. IV ports scrubbed with alcohol when hanging medicine. Patient watch for s/s of infection. Patient taught to report s/s of infection, verbalizes understanding.

## 2018-04-03 NOTE — PROGRESS NOTES
Eric from [facility] called to inform this RN that pts scrotal wound culture came back positive for MRSA.  Dr. Hanson notified.

## 2018-04-03 NOTE — OR SURGEON
Immediate Post OP Note    PreOp Diagnosis: Right Scrotal Abscess/Hematoma    PostOp Diagnosis: As above    Procedure(s):  SCROTUM INCISION AND DRAINAGE  Debridement of Non viable tissue  Wound vac placement  - Wound Class: Dirty or Infected    Surgeon(s):  Hi Mac M.D.    Anesthesiologist/Type of Anesthesia:  Anesthesiologist: Hi Alcantara M.D.  Anesthesia Technician: Colton Thompson/General LMA    Surgical Staff:  Circulator: Marta Shaw R.N.  Scrub Person: Marylin Neumann  Count Gully: Ignacio Justin    Specimens removed if any:A: Aerobic cultures                                               B: Anaerobic cultures                                               C: Necrotic tissue     Estimated Blood Loss: 75ml    Findings: 200cc of old blood and infected fluid removed. Necrotic tissue at the old surgical incision. Testicle viable and left in place    Complications: None  Wound vac placed        4/2/2018 10:13 PM Hi Mac M.D.

## 2018-04-03 NOTE — PROGRESS NOTES
Pt irritable and demanding to know why he has to keep his IV and why he isn't getting IV abx right now.  This RN tried to explain to pt that because he isn't being discharged today (per Dr. Hanson), the hospitalist wanted to keep the IV in place for now. This RN also tried to explain to pt that he is currently taking oral abx, but pt continued to talk over RN and demand to speak with his doctor.  Dr. Hanson notified and told this RN she will talk to pt. Pt updated.

## 2018-04-04 VITALS
HEART RATE: 97 BPM | SYSTOLIC BLOOD PRESSURE: 122 MMHG | WEIGHT: 153.22 LBS | OXYGEN SATURATION: 97 % | HEIGHT: 64 IN | BODY MASS INDEX: 26.16 KG/M2 | RESPIRATION RATE: 18 BRPM | DIASTOLIC BLOOD PRESSURE: 58 MMHG | TEMPERATURE: 98 F

## 2018-04-04 PROBLEM — D72.829 LEUKOCYTOSIS: Status: RESOLVED | Noted: 2018-03-29 | Resolved: 2018-04-04

## 2018-04-04 PROBLEM — E87.6 HYPOKALEMIA: Status: RESOLVED | Noted: 2018-03-30 | Resolved: 2018-04-04

## 2018-04-04 PROBLEM — E87.1 HYPONATREMIA: Status: RESOLVED | Noted: 2018-03-30 | Resolved: 2018-04-04

## 2018-04-04 PROBLEM — N43.3 HYDROCELE: Status: RESOLVED | Noted: 2018-03-29 | Resolved: 2018-04-04

## 2018-04-04 PROCEDURE — 700111 HCHG RX REV CODE 636 W/ 250 OVERRIDE (IP): Performed by: INTERNAL MEDICINE

## 2018-04-04 PROCEDURE — 97605 NEG PRS WND THER DME<=50SQCM: CPT

## 2018-04-04 PROCEDURE — 700102 HCHG RX REV CODE 250 W/ 637 OVERRIDE(OP): Performed by: NURSE PRACTITIONER

## 2018-04-04 PROCEDURE — 700102 HCHG RX REV CODE 250 W/ 637 OVERRIDE(OP): Performed by: INTERNAL MEDICINE

## 2018-04-04 PROCEDURE — A9270 NON-COVERED ITEM OR SERVICE: HCPCS | Performed by: NURSE PRACTITIONER

## 2018-04-04 PROCEDURE — A9270 NON-COVERED ITEM OR SERVICE: HCPCS | Performed by: STUDENT IN AN ORGANIZED HEALTH CARE EDUCATION/TRAINING PROGRAM

## 2018-04-04 PROCEDURE — 99239 HOSP IP/OBS DSCHRG MGMT >30: CPT | Performed by: HOSPITALIST

## 2018-04-04 PROCEDURE — A9270 NON-COVERED ITEM OR SERVICE: HCPCS | Performed by: INTERNAL MEDICINE

## 2018-04-04 PROCEDURE — 700102 HCHG RX REV CODE 250 W/ 637 OVERRIDE(OP): Performed by: STUDENT IN AN ORGANIZED HEALTH CARE EDUCATION/TRAINING PROGRAM

## 2018-04-04 RX ORDER — LINEZOLID 600 MG/1
600 TABLET, FILM COATED ORAL EVERY 12 HOURS
Qty: 20 TAB | Refills: 0 | Status: SHIPPED | OUTPATIENT
Start: 2018-04-04 | End: 2018-04-15

## 2018-04-04 RX ORDER — OXYCODONE HYDROCHLORIDE AND ACETAMINOPHEN 5; 325 MG/1; MG/1
1-2 TABLET ORAL EVERY 6 HOURS PRN
Qty: 20 TAB | Refills: 0 | Status: SHIPPED | OUTPATIENT
Start: 2018-04-04 | End: 2018-04-09

## 2018-04-04 RX ADMIN — OXYCODONE HYDROCHLORIDE AND ACETAMINOPHEN 2 TABLET: 5; 325 TABLET ORAL at 08:17

## 2018-04-04 RX ADMIN — KETOROLAC TROMETHAMINE 15 MG: 30 INJECTION, SOLUTION INTRAMUSCULAR at 09:30

## 2018-04-04 RX ADMIN — LINEZOLID 600 MG: 600 TABLET, FILM COATED ORAL at 08:18

## 2018-04-04 RX ADMIN — LOSARTAN POTASSIUM 100 MG: 25 TABLET, FILM COATED ORAL at 08:18

## 2018-04-04 ASSESSMENT — ENCOUNTER SYMPTOMS
DIZZINESS: 0
VOMITING: 0
DEPRESSION: 0
CHILLS: 0
BLURRED VISION: 0
SHORTNESS OF BREATH: 0
FEVER: 0
NAUSEA: 0
PALPITATIONS: 0
ABDOMINAL PAIN: 0

## 2018-04-04 ASSESSMENT — PAIN SCALES - GENERAL
PAINLEVEL_OUTOF10: 3
PAINLEVEL_OUTOF10: 0

## 2018-04-04 ASSESSMENT — PATIENT HEALTH QUESTIONNAIRE - PHQ9
SUM OF ALL RESPONSES TO PHQ9 QUESTIONS 1 AND 2: 0
1. LITTLE INTEREST OR PLEASURE IN DOING THINGS: NOT AT ALL
2. FEELING DOWN, DEPRESSED, IRRITABLE, OR HOPELESS: NOT AT ALL

## 2018-04-04 ASSESSMENT — PAIN SCALES - WONG BAKER
WONGBAKER_NUMERICALRESPONSE: DOESN'T HURT AT ALL
WONGBAKER_NUMERICALRESPONSE: DOESN'T HURT AT ALL

## 2018-04-04 NOTE — WOUND TEAM
"[facility]  Inpatient Services  Initial Wound & Skin Care Evaluation    Admission Date:  3/29/2018   HPI, PMH, SH: Reviewed  Unit where seen by Wound Team: 2201/01    WOUND CONSULT RELATED TO: Negative Pressure Wound Therapy (NPWT) change    SUBJECTIVE:  \"I've been told this can hurt.\"     Self Report / Pain Level:premedicated by primary RN, needed additional pain medication.       OBJECTIVE: drsg with leakage, pt placed gauze over it  WOUND TYPE, LOCATION, CHARACTERISTICS (Pressure ulcers: location, stage, POA or date identified)  Wound Not POA Open Surgical (Complicated) Scrotum Right Medial  Periwound Skin: Edematous, Discolored  Drainage : Moderate, Serosanguinous       Tissue Type and %:      Wound Edges:    open    Odor:     None   Exposed structure(s):   No   Signs and Symptoms of Infection: slight induration    Measurements: taken 4/4/18  Length (cm): 6  Width (cm): 3.5  Depth (cm): 2.3    Tracts/undermining: 3.5 cm @ 0500, 1.5 cm @ 8049-0014, 2 cm @ 0300    INTERVENTIONS BY WOUND TEAM: instilled 20 ml NS into drsg. removed drsg, slowly with lots of breaks. In process of removing drsg trimmed hair from scrotum.   Dressing Options: Wound Vac    Interdisciplinary consultation:   With Nursing;With Patient    EVALUATION: pt has clean appearing wound to scrotum. Should benefit from NPWT. Pt will need premedication prior to drsg changes.     Factors affecting wound healing: HTN, infection  Goals: decreased wound size 2% each week      NURSING PLAN OF CARE ORDERS (X):    Dressing changes: See Dressing Maintenance orders: x  Skin care: See Skin Care orders:   Rectal tube care: See Rectal Tube Care orders:   Other orders:    RSKIN: CURRENT (X) ORDERED (O)  Pt performs self care  Q shift Navjot:  X  Q shift pressure point assessments:  X  Pressure redistribution mattress        GERBER      Bariatric GERBER      Bariatric foam        Heel float boots       Heels floated on pillows      Barrier wipes    "   Barrier Cream      Barrier paste      Sacral silicone dressing      Padded O2 tubing      Anchorfast      Trach with Optifoam split foam       Waffle cushion      Rectal tube or BMS      Antifungal tx    Turn q 2 hours  Up to chair x  Ambulate   PT/OT     Dietician      PO   x  TF   TPN     PVN    NPO   # days   Other       WOUND TEAM PLAN OF CARE (X):   NPWT change 3 x week:      x  Dressing changes by wound team:       Follow up as needed:       Other (explain):    Anticipated discharge plans (X):  SNF:           Home Care:        Outpatient Wound Center:            Self Care:            Other:   tbd

## 2018-04-04 NOTE — CARE PLAN
Problem: Mobility  Goal: Risk for activity intolerance will decrease  Outcome: PROGRESSING AS EXPECTED  Pt ambulates frequently

## 2018-04-04 NOTE — CARE PLAN
Problem: Pain Management  Goal: Pain level will decrease to patient's comfort goal  Outcome: PROGRESSING AS EXPECTED  Pt denied pain. Encouraged pt to report to the nurse if experiencing pain; pt verbalized understanding       Problem: Infection  Goal: Will remain free from infection  Outcome: PROGRESSING AS EXPECTED  With + MRSA, pt currently on oral Zyvox per I&D until 4/15  Most recent WBCs is 9.2; pt is afebrile (97.9*F).

## 2018-04-04 NOTE — DISCHARGE PLANNING
MARILUZ met with this patient regarding wound vac and HH orders. Patient agreeable to using [facility] for the wound vac.  After talking with Bimal ISRAEL patient decided that he would like to go to the wound clinic.  MARILUZ to informed MD.

## 2018-04-04 NOTE — DISCHARGE INSTRUCTIONS
Discharge Instructions    Discharged to home by car with relative. Discharged via walking, hospital escort: Refused.  Special equipment needed: Wound VAC    Be sure to schedule a follow-up appointment with your primary care doctor or any specialists as instructed.     Discharge Plan:   Influenza Vaccine Indication: Patient Refuses    I understand that a diet low in cholesterol, fat, and sodium is recommended for good health. Unless I have been given specific instructions below for another diet, I accept this instruction as my diet prescription.   Other diet: Regular    Special Instructions: None    · Is patient discharged on Warfarin / Coumadin?   No     Depression / Suicide Risk    As you are discharged from this [facility] facility, it is important to learn how to keep safe from harming yourself.    Recognize the warning signs:  · Abrupt changes in personality, positive or negative- including increase in energy   · Giving away possessions  · Change in eating patterns- significant weight changes-  positive or negative  · Change in sleeping patterns- unable to sleep or sleeping all the time   · Unwillingness or inability to communicate  · Depression  · Unusual sadness, discouragement and loneliness  · Talk of wanting to die  · Neglect of personal appearance   · Rebelliousness- reckless behavior  · Withdrawal from people/activities they love  · Confusion- inability to concentrate     If you or a loved one observes any of these behaviors or has concerns about self-harm, here's what you can do:  · Talk about it- your feelings and reasons for harming yourself  · Remove any means that you might use to hurt yourself (examples: pills, rope, extension cords, firearm)  · Get professional help from the community (Mental Health, Substance Abuse, psychological counseling)  · Do not be alone:Call your Safe Contact- someone whom you trust who will be there for you.  · Call your local CRISIS HOTLINE 026-1845 or  807.613.8870  · Call your local Children's Mobile Crisis Response Team [place] (044) 935-0928 or www.PayEase  · Call the toll free National Suicide Prevention Hotlines   · National Suicide Prevention Lifeline 855-925-CFEK (2214)  · National Hope Line Network 800-SUICIDE (250-4935)    Wound Care, Adult  Taking care of your wound properly can help to prevent pain and infection. It can also help your wound to heal more quickly.  How is this treated?  Wound care  · Follow instructions from your health care provider about how to take care of your wound. Make sure you:  ¨ Wash your hands with soap and water before you change the bandage (dressing). If soap and water are not available, use hand .  ¨ Change your dressing as told by your health care provider.  ¨ Leave stitches (sutures), skin glue, or adhesive strips in place. These skin closures may need to stay in place for 2 weeks or longer. If adhesive strip edges start to loosen and curl up, you may trim the loose edges. Do not remove adhesive strips completely unless your health care provider tells you to do that.  · Check your wound area every day for signs of infection. Check for:  ¨ More redness, swelling, or pain.  ¨ More fluid or blood.  ¨ Warmth.  ¨ Pus or a bad smell.  · Ask your health care provider if you should clean the wound with mild soap and water. Doing this may include:  ¨ Using a clean towel to pat the wound dry after cleaning it. Do not rub or scrub the wound.  ¨ Applying a cream or ointment. Do this only as told by your health care provider.  ¨ Covering the incision with a clean dressing.  · Ask your health care provider when you can leave the wound uncovered.  Medicines   · If you were prescribed an antibiotic medicine, cream, or ointment, take or use the antibiotic as told by your health care provider. Do not stop taking or using the antibiotic even if your condition improves.  · Take over-the-counter and prescription  medicines only as told by your health care provider. If you were prescribed pain medicine, take it at least 30 minutes before doing any wound care or as told by your health care provider.  General instructions  · Return to your normal activities as told by your health care provider. Ask your health care provider what activities are safe.  · Do not scratch or pick at the wound.  · Keep all follow-up visits as told by your health care provider. This is important.  · Eat a diet that includes protein, vitamin A, vitamin C, and other nutrient-rich foods. These help the wound heal:  ¨ Protein-rich foods include meat, dairy, beans, nuts, and other sources.  ¨ Vitamin A-rich foods include carrots and dark green, leafy vegetables.  ¨ Vitamin C-rich foods include citrus, tomatoes, and other fruits and vegetables.  ¨ Nutrient-rich foods have protein, carbohydrates, fat, vitamins, or minerals. Eat a variety of healthy foods including vegetables, fruits, and whole grains.  Contact a health care provider if:  · You received a tetanus shot and you have swelling, severe pain, redness, or bleeding at the injection site.  · Your pain is not controlled with medicine.  · You have more redness, swelling, or pain around the wound.  · You have more fluid or blood coming from the wound.  · Your wound feels warm to the touch.  · You have pus or a bad smell coming from the wound.  · You have a fever or chills.  · You are nauseous or you vomit.  · You are dizzy.  Get help right away if:  · You have a red streak going away from your wound.  · The edges of the wound open up and separate.  · Your wound is bleeding and the bleeding does not stop with gentle pressure.  · You have a rash.  · You faint.  · You have trouble breathing.  This information is not intended to replace advice given to you by your health care provider. Make sure you discuss any questions you have with your health care provider.  Document Released: 09/26/2009 Document  Revised: 08/16/2017 Document Reviewed: 07/04/2017  Elsevier Interactive Patient Education © 2017 [employer]

## 2018-04-04 NOTE — DISCHARGE SUMMARY
CHIEF COMPLAINT ON ADMISSION  Chief Complaint   Patient presents with   • Post-Op Complications     Had procedure on right testicle a month ago, had post-op appt yesteray where testicle was drained. Pt reports that today he has had increased swelling and pain in right testicle, was seen again today by Dr Almaraz at [facility] who sent him here for infection and an ultrasound       CODE STATUS  Full Code    HPI & HOSPITAL COURSE  Chaparro Rosenberg is a 63 y.o. Man with a history of HTN, HLD and right hydrocele admitted 3/29/2018 for increasing swelling and erythema of his right testicle. Pt states he underwent right hydrocele surgery with [facility] (Dr. Frankel) and left hernia repair on 2/26/18. Pt states his testicular swelling never really improved after surgery. He followed up with his surgeon regarding his hernia repair about 2 weeks ago and was doing well from his standpoint. On Wednesday, he had his post op appointment with urology on Wednesday and his right testicle was noted be swollen and blood tinged fluid was drained. He was sent home with Bactrim which he took for Wednesday and Thursday prior to admission however he did not note any improvement. He noted increasing erythema but no worsening pain. He saw urology again earlier on the day of admission due to his concerns as he was going to be traveling for two weeks. He saw Dr. Almaraz and given concern for infection with surrounding erythema, he recommended ED evaluation for IV abx. Pt states he did have subjective fevers and chills the past few days but no nausea, vomiting or abdominal pain. No dysuria. On presentation, he was febrile to 100.8 with a leukocytosis of 14.3. Scrotal US revealed a large complex right hydrocele most likely is hemorrhagic. Pt is on broad spectrum abx. Blood cultures are negative to date. ID consulted for abx recommendations.Surgery urology consulted and had a I&D on 4/2/18  PROCEDURE PERFORMED:  1.  Scrotal incision  and drainage of scrotal hematoma and abscess.  2.  Debridement of nonviable scrotal tissue.  3.  Wound VAC placement.    Patient tolerated procedure well. He was continued on antibiotics, wound cultures grew MRSA, he was discharged with Linezolid, wound vac and was set up with outpatient wound clinic for wound care dressing. Patient is to fu with urology outpatient. He understood and agreed with the above plan.    The patient met 2-midnight criteria for an inpatient stay at the time of discharge.    Therefore, he is discharged in good and stable condition with close outpatient follow-up.    SPECIFIC OUTPATIENT FOLLOW-UP  pcp  Urology  Wound clinic on Friday at 8am    DISCHARGE PROBLEM LIST  Principal Problem:    Cellulitis POA: Yes  Active Problems:    Essential (primary) hypertension POA: Yes  Resolved Problems:    Hydrocele POA: Yes    Leukocytosis POA: Yes    Hyponatremia POA: Unknown    Hypokalemia POA: Unknown      FOLLOW UP  Future Appointments  Date Time Provider Department Center   4/6/2018 8:00 AM SABINE Yoon [address]   4/9/2018 11:00 AM SABINE [facility]ight[facility]   4/11/2018 8:40 AM Marylin Barry M.D. [facility] S[facility]   4/11/2018 11:00 AM SABINE [facility]   4/13/2018 10:00 AM SABINE Hubbard [address]   4/16/2018 11:00 AM SABINE [facility]ightower [address]   4/18/2018 2:00 PM SABINE Yoon [address]   4/20/2018 3:00 PM SABNIE Yoon [address]   4/23/2018 8:00 AM SABINE Carranza [address]   4/25/2018 9:00 AM SABINE Hubbard [address]   4/27/2018 1:30 PM SABINE Carranza [address]   4/30/2018 2:30 PM SABINE Hubbard [address]   5/2/2018 1:00 PM SABINE Hubbard [address]   5/4/2018 1:30 PM SABINE Yoon [address]   5/7/2018 1:30 PM SABINE Hubbard [address]   5/9/2018 1:00 PM SABINE Hubbard [address]   5/11/2018 1:00 PM Shannen Mai R.N. [place] [address]      No follow-up provider specified.    MEDICATIONS ON DISCHARGE   Chaparro Rosenberg   Home Medication Instructions JERMAINE:[id number]    Printed on:04/04/18 1430   Medication Information                      linezolid (ZYVOX) 600 MG Tab  Take 1 Tab by mouth every 12 hours for 11 days.             losartan (COZAAR) 100 MG Tab  Take 1 Tab by mouth every day.             oxyCODONE-acetaminophen (PERCOCET) 5-325 MG Tab  Take 1-2 Tabs by mouth every 6 hours as needed (1-2 tabs prior to wound dressing change) for up to 5 days.                 DIET  Orders Placed This Encounter   Procedures   • DIET ORDER     Standing Status:   Standing     Number of Occurrences:   1     Order Specific Question:   Diet:     Answer:   Regular [1]       ACTIVITY  As tolerated.  Weight bearing as tolerated      CONSULTATIONS  Urology surgery    PROCEDURES  POSTOPERATIVE DIAGNOSIS:  Right scrotal abscess with necrotic nonviable   scrotal skin at the level of the old incision. 4/2/18     LABORATORY  Lab Results   Component Value Date/Time    SODIUM 137 04/03/2018 05:11 AM    POTASSIUM 3.6 04/03/2018 05:11 AM    CHLORIDE 105 04/03/2018 05:11 AM    CO2 24 04/03/2018 05:11 AM    GLUCOSE 90 04/03/2018 05:11 AM    BUN 13 04/03/2018 05:11 AM    CREATININE 0.85 04/03/2018 05:11 AM        Lab Results   Component Value Date/Time    WBC 9.1 04/03/2018 05:11 AM    HEMOGLOBIN 13.0 (L) 04/03/2018 05:11 AM    HEMATOCRIT 37.7 (L) 04/03/2018 05:11 AM    PLATELETCT 229 04/03/2018 05:11 AM        Total time of the discharge process exceeds 38 minutes

## 2018-04-04 NOTE — FACE TO FACE
Face to Face Supporting Documentation - Home Health    The encounter with this patient was in whole or in part the primary reason for home health admission.    Date of encounter:   Patient:                    MRN:                       YOB: 2020  Chaparro Rosenberg  [id number]  1955     Home health to see patient for:  Skilled Nursing care for assessment, interventions & education and Home health aide    Skilled need for:  Exacerbation of Chronic Disease State scrotal abscess    Skilled nursing interventions to include:  Wound Care    Homebound status evidenced by:  Need the aid of supportive devices such as crutches, canes, wheelchairs or walkers. Leaving home requires a considerable and taxing effort. There is a normal inability to leave the home.    Community Physician to provide follow up care: Marylin Barry M.D.     Optional Interventions? No      I certify the face to face encounter for this home health care referral meets the CMS requirements and the encounter/clinical assessment with the patient was, in whole, or in part, for the medical condition(s) listed above, which is the primary reason for home health care. Based on my clinical findings: the service(s) are medically necessary, support the need for home health care, and the homebound criteria are met.  I certify that this patient has had a face to face encounter by myself.  Bettie Hanson M.D. - NPI: [id number]

## 2018-04-04 NOTE — DISCHARGE PLANNING
MARILUZ called Wound Clinic and was able to see this patient Friday morning 8:00 am.  See after visit summary for details.

## 2018-04-04 NOTE — CARE PLAN
Problem: Bowel/Gastric:  Goal: Will not experience complications related to bowel motility  Outcome: PROGRESSING AS EXPECTED  Pt reports regular bowel movements and declines any stool softners

## 2018-04-04 NOTE — PROGRESS NOTES
Received report from day shift nurse. Assumed pt care at 1915. Pt is A&Ox4, resting comfortably in bed. Pt on r.a.. No signs of SOB/respiratory distress noted. Labs noted, VSS. Pt denied pain at this moment. Assessment completed, pt had wound vac to scrotum & working properly; dressing CDI, no drainage noted. Fall precautions in place. Bed locked & at lowest position. Call light and personal belongings within reach, encouraged pt to call for any assistance. Continue to monitor

## 2018-04-04 NOTE — PROGRESS NOTES
"Urology Progress Note    Post op Day # 2     1.  Scrotal incision and drainage of scrotal hematoma and abscess.  2.  Debridement of nonviable scrotal tissue.  3.  Wound VAC placement.     Post op-  Right scrotal abscess with necrotic nonviable   scrotal skin at the level of the old incision    Interval Events: None     S:  Denies any chest pain, SOB or calf pain. No fevers, chills, nausea or vomiting. + flatus.  Minimal to mild pain to right testicle.  O:   Blood pressure 130/80, pulse 92, temperature 36.7 °C (98 °F), resp. rate 18, height 1.626 m (5' 4\"), weight 69.5 kg (153 lb 3.5 oz), SpO2 100 %.  Recent Labs      04/02/18   0437  04/03/18   0511   SODIUM  136  137   POTASSIUM  3.6  3.6   CHLORIDE  105  105   CO2  24  24   GLUCOSE  93  90   BUN  14  13   CREATININE  0.91  0.85   CALCIUM  8.4  8.4     Recent Labs      04/02/18   0437  04/03/18   0511   WBC  9.7  9.1   RBC  4.27*  4.22*   HEMOGLOBIN  13.2*  13.0*   HEMATOCRIT  37.8*  37.7*   MCV  88.5  89.3   MCH  30.9  30.8   MCHC  34.9  34.5   RDW  39.5  38.7   PLATELETCT  225  229   MPV  10.9  10.5         Intake/Output Summary (Last 24 hours) at 04/04/18 1357  Last data filed at 04/04/18 0900   Gross per 24 hour   Intake              720 ml   Output                0 ml   Net              720 ml       Physical Exam   Constitutional: He is oriented to person, place, and time. He appears well-developed and well-nourished. No distress.   HENT:   Head: Normocephalic and atraumatic.   Mouth/Throat: Oropharynx is clear and moist. .   Eyes: Pupils are equal, round, and reactive to light.   Neck: Neck supple. No JVD present. Trachea midline.   Cardiovascular: Normal rate and regular rhythm.    No murmur heard.  Pulmonary/Chest: Effort normal and breath sounds normal. No respiratory distress.   Abdominal: Soft. Bowel sounds are normal. He exhibits no distension.   Genitourinary: mild right testicular pain with wound vac in place  Genitourinary Comments: Wound vac in " place to suction.     Musculoskeletal: Normal range of motion. He exhibits no edema.   Neurological: He is alert and oriented to person, place, and time.   Skin: Skin is warm and dry. He is not diaphoretic. No erythema.   Psychiatric: He has a normal mood and affect.   A/P:    Active Hospital Problems    Diagnosis   • Cellulitis [L03.90]     Priority: High   • Hydrocele [N43.3]     Priority: Medium   • Hyponatremia [E87.1]   • Hypokalemia [E87.6]   • Leukocytosis [D72.829]   • Essential (primary) hypertension [I10]       Stable.   Ambulate, IS.  ABX per ID  Continue wound care with wound dressing changes every other day.    Pt to continue wound care therapy at home with continuation of wound vac at home.  ROCK Rodriguez.

## 2018-04-05 LAB
BACTERIA WND AEROBE CULT: ABNORMAL
BACTERIA WND AEROBE CULT: ABNORMAL
GRAM STN SPEC: ABNORMAL
SIGNIFICANT IND 70042: ABNORMAL
SITE SITE: ABNORMAL
SOURCE SOURCE: ABNORMAL

## 2018-04-05 NOTE — PROGRESS NOTES
Patient discharge teaching completed. Patient IV removed. Patient placed on portable wound vac and instructed on it's use. Patient given all prescriptions and follow up appointments. All questions addressed. Narcotic teaching completed. Pt wife to take patient home. Pt ambulatory and in stable condition at time of transport.

## 2018-04-06 ENCOUNTER — NON-PROVIDER VISIT (OUTPATIENT)
Dept: WOUND CARE | Facility: MEDICAL CENTER | Age: 63
End: 2018-04-06
Attending: HOSPITALIST
Payer: MEDICARE

## 2018-04-06 LAB
BACTERIA SPEC ANAEROBE CULT: NORMAL
SIGNIFICANT IND 70042: NORMAL
SITE SITE: NORMAL
SOURCE SOURCE: NORMAL

## 2018-04-06 PROCEDURE — 97605 NEG PRS WND THER DME<=50SQCM: CPT

## 2018-04-06 NOTE — CERTIFICATION
"Advanced Wound Care  [facility]  [address]  [address]  [place] [address]  [phone] Fax: [phone]      Initial Evaluation  For Certification Period: 04/06/2018 - 06/26/2018  Start of Care: 04/06/2018          Referring Physician: Bettie Hanson M.D.  Primary Physician:  Marylin Barry M.D.      Consulting Physicians:         Wound(s): R scrotum  Pharmacy of Choice:        Subjective:        HPI: Pt is a 63 year old male who presents with a wound vac in place to R scrotum. Patient states that he has had a hydrocele in his scrotum \"for years\" and had been told that this condition usually resolves on its own. Pt recently also developed an inguinal hernia and decided to have hernia repair as well as hydrocele removal surgery at the same time on Feb 26, 2018. Patient had post op follow up with Urologist about a month after surgery. Urologist noted increased swelling to R scrotum, and decided to drain area and sent patient home with antibiotics. A few days later patient noted increased erythema and pain to area and presented to Urologist for check up. At this appointment it was felt that s/s infection were worsening and patient needed to be admitted for IV antibiotics. While admitted, patient had I&D of R scrotum with wound vac placement by Dr. Hi Mac. Referred to [facility] for management.       Pain:  Denies pain. Pain only with walking and wound vac changes. Lidocaine into sponge with 10 minute dwell time. Needs frequent breaks with drape and sponge removal.        Past Medical History:  Past Medical History:   Diagnosis Date   • Hyperlipidemia    • Hypertension        Current Medications:  Current Outpatient Prescriptions:   •  oxyCODONE-acetaminophen (PERCOCET) 5-325 MG Tab, Take 1-2 Tabs by mouth every 6 hours as needed (1-2 tabs prior to wound dressing change) for up to 5 days., Disp: 20 Tab, Rfl: 0  •  linezolid (ZYVOX) 600 MG Tab, Take 1 Tab by mouth every 12 hours for 11 days., Disp: " 20 Tab, Rfl: 0  •  losartan (COZAAR) 100 MG Tab, Take 1 Tab by mouth every day., Disp: 90 Tab, Rfl: 0    Allergies: No Known Allergies    Past Surgical History:   Past Surgical History:   Procedure Laterality Date   • SCROTUM INCISION AND DRAINAGE Right 4/2/2018    Procedure: SCROTUM INCISION AND DRAINAGE with wound vac placement ;  Surgeon: Hi Mac M.D.;  Location: SURGERY [facility];  Service: Urology   • HERNIA REPAIR         Social History:   Social History     Social History   • Marital status:      Spouse name: N/A   • Number of children: N/A   • Years of education: N/A     Occupational History   • Not on file.     Social History Main Topics   • Smoking status: Former Smoker     Packs/day: 1.00     Years: 20.00     Types: Cigarettes     Quit date: 4/11/1989   • Smokeless tobacco: Never Used   • Alcohol use 3.6 oz/week     3 Glasses of wine, 3 Shots of liquor per week   • Drug use: No   • Sexual activity: Yes     Partners: Female     Birth control/ protection: Post-Menopausal     Other Topics Concern   • Not on file     Social History Narrative   • No narrative on file             Objective:      Tests and Measures: None today    Orthotic, protective, supportive devices: none    Fall Risk Assessment (oumou all that apply with an X):  Completed at initial eval on 04/06/2018   X 65 years or older     Fall within the last 2 years, uses   Ambulatory devices  Loss of protective sensation in feet,   Use of prostethic/orthotic, years    Presence of lower extremity/foot/toe amputation   Taking medication that increases risk (per facility policy)    Interventions Recommended (if any of the above are selected):   Use of Assistive Device:________________________   Supervision with ambulation:  Caregiver   Assistance with ambulation:  Caregiver   X Home safety education:  Educational material provided         Wound Characteristics                                                    Location: R scrotum    Initial Evaluation  Date: 2018     Tissue Type and %: 60% moist dark red, 40% white (testicle?)   Periwound: Intact   Drainage: Heavy sanguinous - Cannister changed 2018   Exposed structures Testicle?   Wound Edges:   Open   Odor: None   S&S of Infection:   None, patient on abx   Edema: Local   Sensation: Intact               Measurements: R scrotum Initial Evaluation  Date: 2018     Length (cm) 4.5   Width (cm) 3.0   Depth (cm) 2.3   Area (cm2) 13.5 cm2   Tract/undermine Tracts:              1:00 - 2.0cm               5:00 - 3.1cm                7:00 - 1.6cm        Procedures:  Liquid Lidocaine into tubing with 10 minute dwell time prior to sponge removal. Sonam wound shaved with stoma powder and razor to ease future vac removal. Wound inspected and probed by clinician, no sponge remaining in wound bed   Debridement :  Non selective with NS gauze   Cleansed with:   NSS irrigation x2                                                                       Periwound protected with: No sting skin prep, drape   Primary dressin piece of black sponge to fill tracts and wound bed, 1 piece over top to accommodate tract pad (2 pieces total)   Secondary Dressing: Sealed with drape, NPWT resumed at 125 mmHg continuous with no leaks noted.   Other: Cannister changed 2018     Patient Education: Instructed pt on s/s infection - chills, fever, NV, increased pain/swelling/drainage or foul odor and to go to Urgent Care or ER; that battery lasts 6 hours and to always charge overnight (or plug in whenever at home); on trouble shooting, including making sure cannister is seated properly and not cracked, that clamps are open, and signs of leakage including machine alarming and screen showing that pressure is not at 125 mm/Hg or prescribed pressure; to seal leaks by painting with Skin Prep and applying more drape tape to dressing if necessary; that if unable to fix leak for 2 hours, to remove all tape and foam  and cover wound with moist dressing, ( gauze soaked with normal saline, then dry sterile gauze, pt given supplies) and to notify AWC during business hours; to come to appointments 3x/week; to keep dressing D/I; to bring supplies to each appointment; on 800 number for [employer] and 24 hour support; pt verbalized understanding.       Professional Collaboration: Initial evaluation sent to referring provider via Epic      Assessment:      Wound etiology: Surgical     Wound Progress:  Initial Evaluation    Rationale for Treatment:  NPWT to absorb exudate, maintain moist wound environment, contraction, encourage granulation tissue    Patient tolerance/compliance: Pt tolerated treatment well, and appears interested in all education    Complicating factors: Wound location    Need for ongoing Advanced Wound Care services:Patient requires skilled therapeutic wound care services for product selection, application of product, debridement, close monitoring with clinical assessment for expedite of wound healing.         Plan:      Treatment Plan and Recommendations:  Diagnosis/ICD10:   L03.314 (ICD-10-CM) - Cellulitis of groin   N49.2 (ICD-10-CM) - Scrotal infection       Procedures/CPT: NPWT <50 cm - 92337    Frequency: 3x/week - 60 minutes      Treatment Goals: STG 2 Weeks  LTG 4 Weeks   Granulation Tissue: 100% 100%   Decrease Necrotic Tissue to: 0% 0%   Wound Phase:  Proliferation Proliferation   Decrease Size by: 5% 10%   Periwound:  Intact Intact   Decrease tracts/undermining by: 5% 10%   Decrease Pain:  3/10 1/10       At the time of each visit a thorough assessment of the patient is completed to assure the  appropriateness of our plan of care.  The dressings or modalities may need to be adapted   from the original plan to address any significant changes in the wound environment.          Clinician Signature:_______________________________Date__________________      Physician  Signature:______________________________Date:__________________

## 2018-04-09 ENCOUNTER — NON-PROVIDER VISIT (OUTPATIENT)
Dept: WOUND CARE | Facility: MEDICAL CENTER | Age: 63
End: 2018-04-09
Attending: HOSPITALIST
Payer: MEDICARE

## 2018-04-09 PROCEDURE — 97605 NEG PRS WND THER DME<=50SQCM: CPT

## 2018-04-09 NOTE — WOUND TEAM
"Advanced Wound Care  [facility]  [address]  [address]  [place] [address]  [phone] Fax: [phone]      Initial Evaluation  For Certification Period: 04/06/2018 - 06/26/2018  Start of Care: 04/06/2018          Referring Physician: Bettie Hanson M.D.  Primary Physician:  Marylin Barry M.D.      Consulting Physicians:         Wound(s): R scrotum  Pharmacy of Choice:        Subjective:        HPI: Pt is a 63 year old male who presents with a wound vac in place to R scrotum. Patient states that he has had a hydrocele in his scrotum \"for years\" and had been told that this condition usually resolves on its own. Pt recently also developed an inguinal hernia and decided to have hernia repair as well as hydrocele removal surgery at the same time on Feb 26, 2018. Patient had post op follow up with Urologist about a month after surgery. Urologist noted increased swelling to R scrotum, and decided to drain area and sent patient home with antibiotics. A few days later patient noted increased erythema and pain to area and presented to Urologist for check up. At this appointment it was felt that s/s infection were worsening and patient needed to be admitted for IV antibiotics. While admitted, patient had I&D of R scrotum with wound vac placement by Dr. Hi Mac. Referred to [facility] for management.       Pain:  Denies pain today. Pain only with walking and wound vac changes. Lidocaine into sponge with 10 minute dwell time. Needs frequent breaks with drape and sponge removal.      Pt very anxious, questioning clinicians regarding care but not listening to answers, kept asking if he'd \"be done in two weeks\"     Past Medical History:  Past Medical History:   Diagnosis Date   • Hyperlipidemia    • Hypertension        Current Medications:  Current Outpatient Prescriptions:   •  oxyCODONE-acetaminophen (PERCOCET) 5-325 MG Tab, Take 1-2 Tabs by mouth every 6 hours as needed (1-2 tabs prior to wound dressing " change) for up to 5 days., Disp: 20 Tab, Rfl: 0  •  linezolid (ZYVOX) 600 MG Tab, Take 1 Tab by mouth every 12 hours for 11 days., Disp: 20 Tab, Rfl: 0  •  losartan (COZAAR) 100 MG Tab, Take 1 Tab by mouth every day., Disp: 90 Tab, Rfl: 0    Allergies: No Known Allergies    Past Surgical History:   Past Surgical History:   Procedure Laterality Date   • SCROTUM INCISION AND DRAINAGE Right 4/2/2018    Procedure: SCROTUM INCISION AND DRAINAGE with wound vac placement ;  Surgeon: Hi Mac M.D.;  Location: SURGERY [facility];  Service: Urology   • HERNIA REPAIR             Objective:      Tests and Measures: None today    Orthotic, protective, supportive devices: none    Fall Risk Assessment (oumou all that apply with an X):  Completed at initial eval on 04/06/2018          Wound Characteristics                                                    Location: R scrotum   Initial Evaluation  Date: 04/06/2018   Encounter Note: 4/9/2018   Tissue Type and %: 60% moist dark red, 40% white (testicle?) 60% moist dark red, 40% white (testicle?)   Periwound: Intact intact   Drainage: Heavy sanguinous - Cannister changed 04/06/2018 Mod ss, cannister changed    Exposed structures Testicle? testicle   Wound Edges:   Open open   Odor: None none   S&S of Infection:   None, patient on abx Pt on ABX   Edema: Local local   Sensation: Intact intact               Measurements: R scrotum Initial Evaluation  Date: 04/06/2018     Length (cm) 4.5   Width (cm) 3.0   Depth (cm) 2.3   Area (cm2) 13.5 cm2   Tract/undermine Tracts:              1:00 - 2.0cm               5:00 - 3.1cm                7:00 - 1.6cm        Procedures: Sponge removed per tech.  Liquid Lidocaine into sponge with 10 minute dwell time prior to sponge removal. Sonam wound and abdomen shaved with stoma powder and razor to ease future vac removal. Wound inspected and probed by clinician, small pieces of foam removed with rivas, BRIDGET Earl, at bedside for confirmation  that dark area is blood clot material, no remaining foam in wound bed.    Debridement :  Non selective with NS gauze   Cleansed with:   NSS irrigation x2                                                                       Periwound protected with: No sting skin prep, drape to periwound and tracked up abdomen   Primary dressin piece of black sponge to fill wound bed (tracts not as deep as before), 1 piece over top to accommodate tract pad (2 pieces total)   Secondary Dressing: Sealed with drape, NPWT resumed at 125 mmHg continuous with no leaks noted.   Other: Cannister changed 2018     Patient Education: Instructed pt on  signs of leakage including machine alarming and screen showing that pressure is not at 125 mm/Hg or prescribed pressure; to seal leaks by painting with Skin Prep and applying more drape tape to dressing if necessary; that if unable to fix leak for 2 hours, to remove all tape and foam and cover wound with moist dressing, and to notify [facility] during business hours; to come to appointments 3x/week; to keep dressing D/I; that vac may be discontinued as wound fills in to depth <0.5 cm. pt verbalized understanding.       Professional Collaboration: Rajat GILL at bedside      Assessment:      Wound etiology: Surgical     Wound Progress: tracts with less depth, no measurements today.     Rationale for Treatment:  NPWT to absorb exudate, maintain moist wound environment, contraction, encourage granulation tissue    Patient tolerance/compliance: Pt very, very anxious    Complicating factors: Wound location    Need for ongoing Advanced Wound Care services:Patient requires skilled therapeutic wound care services for product selection, application of product, debridement, close monitoring with clinical assessment for expedite of wound healing.         Plan:      Treatment Plan and Recommendations:  Diagnosis/ICD10:   L03.314 (ICD-10-CM) - Cellulitis of groin   N49.2 (ICD-10-CM) - Scrotal infection        Procedures/CPT: NPWT <50 cm - 97040    Frequency: 3x/week - 60 minutes      Treatment Goals: STG 2 Weeks  LTG 4 Weeks   Granulation Tissue: 100% 100%   Decrease Necrotic Tissue to: 0% 0%   Wound Phase:  Proliferation Proliferation   Decrease Size by: 5% 10%   Periwound:  Intact Intact   Decrease tracts/undermining by: 5% 10%   Decrease Pain:  3/10 1/10       At the time of each visit a thorough assessment of the patient is completed to assure the  appropriateness of our plan of care.  The dressings or modalities may need to be adapted   from the original plan to address any significant changes in the wound environment.          Clinician Signature:_______________________________Date__________________      Physician Signature:______________________________Date:__________________

## 2018-04-11 ENCOUNTER — NON-PROVIDER VISIT (OUTPATIENT)
Dept: WOUND CARE | Facility: MEDICAL CENTER | Age: 63
End: 2018-04-11
Attending: HOSPITALIST
Payer: MEDICARE

## 2018-04-11 PROCEDURE — 97605 NEG PRS WND THER DME<=50SQCM: CPT

## 2018-04-11 NOTE — WOUND TEAM
"Advanced Wound Care  [facility]  [address]  [address]  [place] [address]  [phone] Fax: [phone]      Encounter Note  For Certification Period: 04/06/2018 - 06/26/2018  Start of Care: 04/06/2018          Referring Physician: Bettie Hanson M.D.  Primary Physician:  Marylin Barry M.D.      Consulting Physicians:         Wound(s): R scrotum  Pharmacy of Choice:        Subjective:        HPI: Pt is a 63 year old male who presents with a wound vac in place to R scrotum. Patient states that he has had a hydrocele in his scrotum \"for years\" and had been told that this condition usually resolves on its own. Pt recently also developed an inguinal hernia and decided to have hernia repair as well as hydrocele removal surgery at the same time on Feb 26, 2018. Patient had post op follow up with Urologist about a month after surgery. Urologist noted increased swelling to R scrotum, and decided to drain area and sent patient home with antibiotics. A few days later patient noted increased erythema and pain to area and presented to Urologist for check up. At this appointment it was felt that s/s infection were worsening and patient needed to be admitted for IV antibiotics. While admitted, patient had I&D of R scrotum with wound vac placement by Dr. Hi Mac. Referred to [facility] for management.       Pain:  Denies pain today. Pain only with walking and wound vac changes. Lidocaine into sponge with 10 minute dwell time. Needs frequent breaks with drape and sponge removal.           Past Medical History:  Past Medical History:   Diagnosis Date   • Hyperlipidemia    • Hypertension        Current Medications:  Current Outpatient Prescriptions:   •  linezolid (ZYVOX) 600 MG Tab, Take 1 Tab by mouth every 12 hours for 11 days., Disp: 20 Tab, Rfl: 0  •  losartan (COZAAR) 100 MG Tab, Take 1 Tab by mouth every day., Disp: 90 Tab, Rfl: 0    Allergies: No Known Allergies    Past Surgical History:   Past Surgical " History:   Procedure Laterality Date   • SCROTUM INCISION AND DRAINAGE Right 2018    Procedure: SCROTUM INCISION AND DRAINAGE with wound vac placement ;  Surgeon: Hi Mac M.D.;  Location: SURGERY [facility];  Service: Urology   • HERNIA REPAIR             Objective:      Tests and Measures: None today    Orthotic, protective, supportive devices: none    Fall Risk Assessment (oumou all that apply with an X):  Completed at initial eval on 2018          Wound Characteristics                                                    Location: R scrotum   Initial Evaluation  Date: 2018   Encounter Note: 2018   Tissue Type and %: 60% moist dark red, 40% white (testicle?) 60% moist dark red, 40% marbled white    Periwound: Intact intact   Drainage: Heavy sanguinous - Cannister changed 2018 Mod ss, cannister changed    Exposed structures Testicle? fascia   Wound Edges:   Open open   Odor: None none   S&S of Infection:   None, patient on abx None Pt on ABX   Edema: Local local   Sensation: Intact intact               Measurements: R scrotum Initial Evaluation  Date: 2018   Encounter Note 2018   Length (cm) 4.5 3.5   Width (cm) 3.0 2.7   Depth (cm) 2.3 1.9   Area (cm2) 13.5 cm2 9.45cm2   Tract/undermine Tracts:              1:00 - 2.0cm               5:00 - 3.1cm                7:00 - 1.6cm Undermining -   1:00 - 0.5  5:00 - 1.6  7:00 - 0.8        Procedures:   Liquid Lidocaine into sponge with 10 minute dwell time prior to sponge removal.      Debridement :  Non selective with NS gauze   Cleansed with:   NSS irrigation x2                                                                       Periwound protected with: No sting skin prep, drape to periwound and tracked up to abdomen RLQ iliac fossa   Primary dressin piece of black sponge to undermining, 1 piece to fill wound dead space,  one to bridge and for tract pad (3 pieces total)   Secondary Dressing: Sealed with drape, NPWT  resumed at 125 mmHg continuous with no leaks noted.   Other: Cannister changed 04/09/2018     Patient Education:Re- Instructed pt on  signs of leakage including machine alarming, how to seal leaks by painting with Skin Prep and applying more drape tape to dressing if necessary; that if unable to fix leak for 2 hours, to remove all tape and foam and cover wound with moist dressing, and to notify AWC during business hours; to come to appointments 3x/week; to keep dressing D/I; that vac may be discontinued as wound fills in to depth <0.5 cm. pt verbalized understanding.       Professional Collaboration: none      Assessment:      Wound etiology: Surgical     Wound Progress: tracts with less depth, wound significantly smaller per measurements today.     Rationale for Treatment:  NPWT to absorb exudate, maintain moist wound environment, contraction, encourage granulation tissue    Patient tolerance/compliance: Pt very, very anxious    Complicating factors: Wound location    Need for ongoing Advanced Wound Care services:Patient requires skilled therapeutic wound care services for product selection, application of product, debridement, close monitoring with clinical assessment for expedite of wound healing.         Plan:      Treatment Plan and Recommendations:  Diagnosis/ICD10:   L03.314 (ICD-10-CM) - Cellulitis of groin   N49.2 (ICD-10-CM) - Scrotal infection       Procedures/CPT: NPWT <50 cm - 24249    Frequency: 3x/week - 60 minutes      Treatment Goals: STG 2 Weeks  LTG 4 Weeks   Granulation Tissue: 100% 100%   Decrease Necrotic Tissue to: 0% 0%   Wound Phase:  Proliferation Proliferation   Decrease Size by: 5% 10%   Periwound:  Intact Intact   Decrease tracts/undermining by: 5% 10%   Decrease Pain:  3/10 1/10       At the time of each visit a thorough assessment of the patient is completed to assure the  appropriateness of our plan of care.  The dressings or modalities may need to be adapted   from the original plan  to address any significant changes in the wound environment.          Clinician Signature:_______________________________Date__________________      Physician Signature:______________________________Date:__________________

## 2018-04-12 NOTE — WOUND TEAM
Spoke with Dr. Mac, he would like wound picture emailed, and would also like to come to pt's appt at [facility] next Wednesday 04/18/2018 and see wound. Pt's appt time was changed by Belle to 3pm to accommodate Dr. Mac. She notified MD and pt. Wound picture from 04/11/2018 was emailed to MD.

## 2018-04-13 ENCOUNTER — NON-PROVIDER VISIT (OUTPATIENT)
Dept: WOUND CARE | Facility: MEDICAL CENTER | Age: 63
End: 2018-04-13
Attending: HOSPITALIST
Payer: MEDICARE

## 2018-04-13 PROCEDURE — 97605 NEG PRS WND THER DME<=50SQCM: CPT

## 2018-04-13 NOTE — WOUND TEAM
"Advanced Wound Care  [facility]  [address]  [address]  [place] [address]  [phone] Fax: [phone]      Encounter Note  For Certification Period: 04/06/2018 - 06/26/2018  Start of Care: 04/06/2018          Referring Physician: Bettie Hanson M.D.  Primary Physician:  Marylin Barry M.D.      Consulting Physicians:         Wound(s): R scrotum  Pharmacy of Choice:        Subjective:        HPI: Pt is a 63 year old male who presents with a wound vac in place to R scrotum. Patient states that he has had a hydrocele in his scrotum \"for years\" and had been told that this condition usually resolves on its own. Pt recently also developed an inguinal hernia and decided to have hernia repair as well as hydrocele removal surgery at the same time on Feb 26, 2018. Patient had post op follow up with Urologist about a month after surgery. Urologist noted increased swelling to R scrotum, and decided to drain area and sent patient home with antibiotics. A few days later patient noted increased erythema and pain to area and presented to Urologist for check up. At this appointment it was felt that s/s infection were worsening and patient needed to be admitted for IV antibiotics. While admitted, patient had I&D of R scrotum with wound vac placement by Dr. Hi Mac. Referred to [facility] for management.     Pain:  Denies pain today. Pain only with walking and wound vac changes. Lidocaine into sponge with 10 minute dwell time. Needs frequent breaks with drape and sponge removal.         Current Medications:  Current Outpatient Prescriptions:   •  linezolid (ZYVOX) 600 MG Tab, Take 1 Tab by mouth every 12 hours for 11 days., Disp: 20 Tab, Rfl: 0  •  losartan (COZAAR) 100 MG Tab, Take 1 Tab by mouth every day., Disp: 90 Tab, Rfl: 0    Allergies: No Known Allergies      Objective:      Tests and Measures: None today    Orthotic, protective, supportive devices: none    Fall Risk Assessment (oumou all that apply with " "an X):  Completed at initial eval on 2018          Wound Characteristics                                                    Location: R scrotum   Initial Evaluation  Date: 2018   Encounter Note: 2018   Tissue Type and %: 60% moist dark red, 40% white (testicle?) 60% moist red, 30% marbled white    Periwound: Intact intact   Drainage: Heavy sanguinous - Cannister changed 2018 Mod ss, cannister changed    Exposed structures Testicle? fascia   Wound Edges:   Open open   Odor: None none   S&S of Infection:   None, patient on abx None Pt on ABX   Edema: Local local   Sensation: Intact intact               Measurements: R scrotum Initial Evaluation  Date: 2018   Encounter Note 2018   Length (cm) 4.5 3.5   Width (cm) 3.0 2.7   Depth (cm) 2.3 1.9   Area (cm2) 13.5 cm2 9.45cm2   Tract/undermine Tracts:              1:00 - 2.0cm               5:00 - 3.1cm                7:00 - 1.6cm Undermining -   1:00 - 0.5  5:00 - 1.6  7:00 - 0.8        Procedures:   Liquid Lidocaine into sponge with 10 minute dwell time prior to sponge removal. Sponge removed by clinician, wound bed inspected, no sponges remained. Pt continued c/o pain, lidocaine gel 2% applied, dwell time ~5min.   Debridement :  Non selective with NS gauze   Cleansed with:   NSS irrigation x2                                                                       Periwound protected with: No sting skin prep, drape to periwound and tracked up to abdomen RLQ iliac fossa   Primary dressin piece of black sponge to undermining, 1 piece to fill wound dead space,  one to bridge and for tract pad (3 pieces total)   Secondary Dressing: Sealed with drape, NPWT resumed at 125 mmHg continuous with no leaks noted.   Other: Cannister changed 2018 per pt request     Patient Education: POC and wound progress discussed with pt. Dr. Mac to come to pt's AWC visit on 18. Pt states during his last visit to Dr. Mac, \"it sounded like he was " "suggesting not to pack so tight in the wound.\" Wound lightly packed with sponges. VAC restarted without leak, however initially the leak gauge showed green bar that kept \"jumping\" and pt seemed concerned. Explained to pt as long as the bar stays green it is ok. At the end of the visit green bar was not \"jumping\" anymore. Pt knowledgeable on leaking VAC and what to do if he can't fix it in 2 hours.     Professional Collaboration: none      Assessment:      Wound etiology: Surgical     Wound Progress: No significant change     Rationale for Treatment:  NPWT to absorb exudate, maintain moist wound environment, contraction, encourage granulation tissue    Patient tolerance/compliance: Pt very, very anxious    Complicating factors: Wound location    Need for ongoing Advanced Wound Care services:Patient requires skilled therapeutic wound care services for product selection, application of product, debridement, close monitoring with clinical assessment for expedite of wound healing.         Plan:      Treatment Plan and Recommendations:  Diagnosis/ICD10:   L03.314 (ICD-10-CM) - Cellulitis of groin   N49.2 (ICD-10-CM) - Scrotal infection       Procedures/CPT: NPWT <50 cm - 51443    Frequency: 3x/week - 60 minutes      Treatment Goals: STG 2 Weeks  LTG 4 Weeks   Granulation Tissue: 100% 100%   Decrease Necrotic Tissue to: 0% 0%   Wound Phase:  Proliferation Proliferation   Decrease Size by: 5% 10%   Periwound:  Intact Intact   Decrease tracts/undermining by: 5% 10%   Decrease Pain:  3/10 1/10       At the time of each visit a thorough assessment of the patient is completed to assure the  appropriateness of our plan of care.  The dressings or modalities may need to be adapted   from the original plan to address any significant changes in the wound environment.          Clinician Signature:_______________________________Date__________________      Physician Signature:______________________________Date:__________________    "

## 2018-04-16 ENCOUNTER — NON-PROVIDER VISIT (OUTPATIENT)
Dept: WOUND CARE | Facility: MEDICAL CENTER | Age: 63
End: 2018-04-16
Attending: HOSPITALIST
Payer: MEDICARE

## 2018-04-16 PROCEDURE — 97605 NEG PRS WND THER DME<=50SQCM: CPT

## 2018-04-16 NOTE — WOUND TEAM
"Advanced Wound Care  [facility]  [address]  [address]  [place] [address]  [phone] Fax: [phone]      Encounter Note  For Certification Period: 04/06/2018 - 06/26/2018  Start of Care: 04/06/2018      DR. MAC WILL COME TO VISIT ON 4/18 TO SEE WOUND. SEE BLAISE'S NOTE OF 4/11/2018    Referring Physician: Bettie Hanson M.D.  Primary Physician:  Marylin Barry M.D.      Consulting Physicians:         Wound(s): R scrotum  Pharmacy of Choice:        Subjective:        HPI: Pt is a 63 year old male who presents with a wound vac in place to R scrotum. Patient states that he has had a hydrocele in his scrotum \"for years\" and had been told that this condition usually resolves on its own. Pt recently also developed an inguinal hernia and decided to have hernia repair as well as hydrocele removal surgery at the same time on Feb 26, 2018. Patient had post op follow up with Urologist about a month after surgery. Urologist noted increased swelling to R scrotum, and decided to drain area and sent patient home with antibiotics. A few days later patient noted increased erythema and pain to area and presented to Urologist for check up. At this appointment it was felt that s/s infection were worsening and patient needed to be admitted for IV antibiotics. While admitted, patient had I&D of R scrotum with wound vac placement by Dr. Hi Mac. Referred to [facility] for management.     Pain:  Denies pain today. Pain only with walking and wound vac changes. Lidocaine into sponge with 10 minute dwell time. Needs frequent breaks with drape and sponge removal.         Current Medications:  Current Outpatient Prescriptions:   •  losartan (COZAAR) 100 MG Tab, Take 1 Tab by mouth every day., Disp: 90 Tab, Rfl: 0    Allergies: No Known Allergies      Objective:      Tests and Measures: None today    Orthotic, protective, supportive devices: none    Fall Risk Assessment (oumou all that apply with an X):  Completed at " initial eval on 04/06/2018          Wound Characteristics                                                    Location: R scrotum   Initial Evaluation  Date: 04/06/2018   Encounter Note: 4/13/2018 Encounter Note: 4/13/2018   Tissue Type and %: 60% moist dark red, 40% white (testicle?) 60% moist red, 30% marbled white  60% moist red, 30% marbled white    Periwound: Intact intact intact   Drainage: Heavy sanguinous - Cannister changed 04/06/2018 Mod ss, cannister changed  Min ss   Exposed structures Testicle? fascia fascia   Wound Edges:   Open Open open   Odor: None None none   S&S of Infection:   None, patient on abx None Pt on ABX none   Edema: Local Local local   Sensation: Intact intact intact               Measurements: R scrotum Initial Evaluation  Date: 04/06/2018   Encounter Note 04/11/2018   Length (cm) 4.5 3.5   Width (cm) 3.0 2.7   Depth (cm) 2.3 1.9   Area (cm2) 13.5 cm2 9.45cm2   Tract/undermine Tracts:              1:00 - 2.0cm               5:00 - 3.1cm                7:00 - 1.6cm Undermining -   1:00 - 0.5  5:00 - 1.6  7:00 - 0.8        Procedures:   Liquid Lidocaine into sponge with 10 minute dwell time prior to sponge removal. Sponge removed by clinician, wound bed inspected, no sponge retained in wound bed.   Debridement :  Non selective with NS gauze   Cleansed with:   NSS, gauze                                                                   Periwound protected with: No sting skin prep, drape to periwound and tracked up to abdomen RLQ iliac fossa   Primary dressing: Hydrogel to wound bed (to keep foam from sticking), 1 piece of black sponge to undermining and wound bed, tracked to RLQ of abdomen (! Piece)   Secondary Dressing: Sealed with drape, NPWT resumed at 125 mmHg continuous with no leaks noted.   Other: Cannister changed 04/13/2018 per pt request     Patient Education: Pt instructed on wound progress, pt knowledgeable on fixing leaks (given extra drape and No Sting) and to remove all  dressing and foam if cannot patch leak for two hours, to cover with moist dressing and contact AWC during business hours. , on s/s infection and when to go to ER, to keep dressing D/I and to return three times a week for appointments.   Professional Collaboration: none    DR. ROSE WILL COME TO SEE PT WOUND ON 4/18 AT 3 PM APPOINTMENT.  SEE BLAISE'S NOTE OF 4/11/2018  Assessment:      Wound etiology: Surgical     Wound Progress: no measurements today    Rationale for Treatment:  NPWT to absorb exudate, maintain moist wound environment, contraction, encourage granulation tissue    Patient tolerance/compliance: Pt calmer today but very anxious to be done with wound care.     Complicating factors: Wound location    Need for ongoing Advanced Wound Care services:Patient requires skilled therapeutic wound care services for product selection, application of product, debridement, close monitoring with clinical assessment for expedite of wound healing.         Plan:      Treatment Plan and Recommendations:  Diagnosis/ICD10:   L03.314 (ICD-10-CM) - Cellulitis of groin   N49.2 (ICD-10-CM) - Scrotal infection       Procedures/CPT: NPWT <50 cm - 50393    Frequency: 3x/week - 60 minutes      Treatment Goals: STG 2 Weeks  LTG 4 Weeks   Granulation Tissue: 100% 100%   Decrease Necrotic Tissue to: 0% 0%   Wound Phase:  Proliferation Proliferation   Decrease Size by: 5% 10%   Periwound:  Intact Intact   Decrease tracts/undermining by: 5% 10%   Decrease Pain:  3/10 1/10       At the time of each visit a thorough assessment of the patient is completed to assure the  appropriateness of our plan of care.  The dressings or modalities may need to be adapted   from the original plan to address any significant changes in the wound environment.          Clinician Signature:_______________________________Date__________________      Physician Signature:______________________________Date:__________________

## 2018-04-18 ENCOUNTER — NON-PROVIDER VISIT (OUTPATIENT)
Dept: WOUND CARE | Facility: MEDICAL CENTER | Age: 63
End: 2018-04-18
Attending: HOSPITALIST
Payer: MEDICARE

## 2018-04-18 PROCEDURE — 97605 NEG PRS WND THER DME<=50SQCM: CPT

## 2018-04-18 NOTE — WOUND TEAM
"Advanced Wound Care  [facility]  [address]  [address]  [place] [address]  [phone] Fax: [phone]      Encounter Note  For Certification Period: 04/06/2018 - 06/26/2018  Start of Care: 04/06/2018      Referring Physician: Bettie Hanson M.D.  Primary Physician:  Marylin Barry M.D.      Consulting Physicians:         Wound(s): R scrotum  Pharmacy of Choice:        Subjective:        HPI: Pt is a 63 year old male who presents with a wound vac in place to R scrotum. Patient states that he has had a hydrocele in his scrotum \"for years\" and had been told that this condition usually resolves on its own. Pt recently also developed an inguinal hernia and decided to have hernia repair as well as hydrocele removal surgery at the same time on Feb 26, 2018. Patient had post op follow up with Urologist about a month after surgery. Urologist noted increased swelling to R scrotum, and decided to drain area and sent patient home with antibiotics. A few days later patient noted increased erythema and pain to area and presented to Urologist for check up. At this appointment it was felt that s/s infection were worsening and patient needed to be admitted for IV antibiotics. While admitted, patient had I&D of R scrotum with wound vac placement by Dr. Hi Mac. Referred to [facility] for management.     Pain:  Denies pain today. Pain only with walking and wound vac changes. Lidocaine into sponge with 10 minute dwell time. Needs occasional breaks with drape and sponge removal.         Current Medications:  Current Outpatient Prescriptions:   •  losartan (COZAAR) 100 MG Tab, Take 1 Tab by mouth every day., Disp: 90 Tab, Rfl: 0    Allergies: No Known Allergies      Objective:      Tests and Measures: None today    Orthotic, protective, supportive devices: none    Fall Risk Assessment (oumou all that apply with an X):  Completed at initial eval on 04/06/2018          Wound Characteristics                            "                         Location: R scrotum   Initial Evaluation  Date: 04/06/2018   Encounter Note: 4/18/2018   Tissue Type and %: 60% moist dark red, 40% white (testicle?) 70% moist red granular, 30% marbled white    Periwound: Intact Intact   Drainage: Heavy sanguinous - Cannister changed 04/06/2018 Scant ss - Cannister changed by patient on 04/17/2018   Exposed structures Testicle? Fascia   Wound Edges:   Open Open   Odor: None None   S&S of Infection:   None, patient on abx None   Edema: Local Local   Sensation: Intact Intact               Measurements: R scrotum Initial Evaluation  Date: 04/06/2018   Encounter Note 04/11/2018 Encounter Date: 04/18/2018   Length (cm) 4.5 3.5 3.0   Width (cm) 3.0 2.7 1.5   Depth (cm) 2.3 1.9 1.2   Area (cm2) 13.5 cm2 9.45cm2 4.5 cm2    Tract/undermine Tracts:              1:00 - 2.0cm               5:00 - 3.1cm                7:00 - 1.6cm Undermining -   1:00 - 0.5  5:00 - 1.6  7:00 - 0.8 Tracts:  5:00 - 1.5 cm  7:00 - 0.7 cm        Procedures:   Liquid Lidocaine into sponge with 10 minute dwell time prior to sponge removal. Sponge removed by clinician, wound bed inspected, no sponge retained in wound bed.   Debridement :  Non selective with NS gauze   Cleansed with:   NSS, gauze                                                                   Periwound protected with: No sting skin prep, drape to periwound and tracked up to abdomen RLQ iliac fossa   Primary dressing: Hydrogel to wound bed (to keep foam from sticking), 1 piece of black sponge to undermining and wound bed, tracked to RLQ of abdomen (1 Piece)   Secondary Dressing: Sealed with drape, NPWT resumed at 125 mmHg continuous with no leaks noted.   Other: Cannister changed 04/17/2018 by pt (patient dropped wound vac off counter and lost suction.       Patient Education: Discussed POC and wound progress with patient and Dr. Mac, who popped in to appointment to assess wound progress, and visualize wound bed in person.  Patient nervous about no longer being on antibiotics. Patient requesting CBC to check for infection even though there are no s/s. Per Dr. Mac, patient can have that drawn at his office at next appointment if pt wishes. Patient anxious to remove wound vac, instructed patient that it may still be about 2 weeks before wound is at a stage where wound vac can be removed. Pt in agreement. Reviewed s/s infection and when to present to ED. Pt verbalizes understanding of all instruction.     Professional Collaboration: Dr. Mac (Urology) in to visualize wound      Assessment:      Wound etiology: Surgical     Wound Progress: Wound smaller per measurement    Rationale for Treatment:  Hydrogel to base of wound for ease of sponge removal, NPWT to absorb exudate, maintain moist wound environment, contraction, encourage granulation tissue    Patient tolerance/compliance: Pt tolerated treatment moderately well. Remains anxious to have wound vac removed.     Complicating factors: Wound location    Need for ongoing Advanced Wound Care services:Patient requires skilled therapeutic wound care services for product selection, application of product, debridement, close monitoring with clinical assessment to expedite of wound healing.         Plan:      Treatment Plan and Recommendations:  Diagnosis/ICD10:   L03.314 (ICD-10-CM) - Cellulitis of groin   N49.2 (ICD-10-CM) - Scrotal infection       Procedures/CPT: NPWT <50 cm - 87452    Frequency: 3x/week - 60 minutes      Treatment Goals: STG 2 Weeks  LTG 4 Weeks   Granulation Tissue: 100% 100%   Decrease Necrotic Tissue to: 0% 0%   Wound Phase:  Proliferation Proliferation   Decrease Size by: 5% 10%   Periwound:  Intact Intact   Decrease tracts/undermining by: 5% 10%   Decrease Pain:  3/10 1/10       At the time of each visit a thorough assessment of the patient is completed to assure the  appropriateness of our plan of care.  The dressings or modalities may need to be adapted   from the  original plan to address any significant changes in the wound environment.          Clinician Signature:_______________________________Date__________________      Physician Signature:______________________________Date:__________________

## 2018-04-20 ENCOUNTER — NON-PROVIDER VISIT (OUTPATIENT)
Dept: WOUND CARE | Facility: MEDICAL CENTER | Age: 63
End: 2018-04-20
Attending: HOSPITALIST
Payer: MEDICARE

## 2018-04-20 PROCEDURE — 97605 NEG PRS WND THER DME<=50SQCM: CPT

## 2018-04-20 PROCEDURE — 97597 DBRDMT OPN WND 1ST 20 CM/<: CPT

## 2018-04-20 NOTE — WOUND TEAM
"Advanced Wound Care  [facility]  [address]  [address]  [place] [address]  [phone] Fax: [phone]      Encounter Note  For Certification Period: 04/06/2018 - 06/26/2018  Start of Care: 04/06/2018      Referring Physician: Bettie Hanson M.D.  Primary Physician:  Marylin Barry M.D.      Consulting Physicians:         Wound(s): R scrotum  Pharmacy of Choice:        Subjective:        HPI: Pt is a 63 year old male who presents with a wound vac in place to R scrotum. Patient states that he has had a hydrocele in his scrotum \"for years\" and had been told that this condition usually resolves on its own. Pt recently also developed an inguinal hernia and decided to have hernia repair as well as hydrocele removal surgery at the same time on Feb 26, 2018. Patient had post op follow up with Urologist about a month after surgery. Urologist noted increased swelling to R scrotum, and decided to drain area and sent patient home with antibiotics. A few days later patient noted increased erythema and pain to area and presented to Urologist for check up. At this appointment it was felt that s/s infection were worsening and patient needed to be admitted for IV antibiotics. While admitted, patient had I&D of R scrotum with wound vac placement by Dr. Hi Mac. Referred to [facility] for management.     Pain:  Denies pain today. Pain only with walking and wound vac changes. Lidocaine into sponge with 10 minute dwell time. Needs occasional breaks with drape and sponge removal.         Current Medications:  Current Outpatient Prescriptions:   •  losartan (COZAAR) 100 MG Tab, Take 1 Tab by mouth every day., Disp: 90 Tab, Rfl: 0    Allergies: No Known Allergies      Objective:      Tests and Measures: None today    Orthotic, protective, supportive devices: none    Fall Risk Assessment (oumou all that apply with an X):  Completed at initial eval on 04/06/2018          Wound Characteristics                            "                         Location: R scrotum   Initial Evaluation  Date: 04/06/2018   Encounter Note: 4/20/2018   Tissue Type and %: 60% moist dark red, 40% white (testicle?) 90% moist red granular, 10% marbled white    Periwound: Intact Intact   Drainage: Heavy sanguinous - Cannister changed 04/06/2018 Scant ss - Cannister changed by patient on 04/17/2018   Exposed structures Testicle? Fascia   Wound Edges:   Open Open   Odor: None None   S&S of Infection:   None, patient on abx None   Edema: Local Local   Sensation: Intact Intact               Measurements: R scrotum Initial Evaluation  Date: 04/06/2018   Encounter Note 04/11/2018 Encounter Date: 04/18/2018   Length (cm) 4.5 3.5 3.0   Width (cm) 3.0 2.7 1.5   Depth (cm) 2.3 1.9 1.2   Area (cm2) 13.5 cm2 9.45cm2 4.5 cm2    Tract/undermine Tracts:              1:00 - 2.0cm               5:00 - 3.1cm                7:00 - 1.6cm Undermining -   1:00 - 0.5  5:00 - 1.6  7:00 - 0.8 Tracts:  5:00 - 1.5 cm  7:00 - 0.7 cm        Procedures:   Liquid Lidocaine into sponge with 10 minute dwell time prior to sponge removal. Sponge removed by clinician, wound bed inspected, no sponge retained in wound bed.   Debridement :  CSWD with scalpel to remove ~1.0cm2 of adherent yellow/white slough from wound bed   Cleansed with:   NSS, gauze                                                                   Periwound protected with: No sting skin prep, drape to periwound and tracked up to abdomen RLQ iliac fossa   Primary dressing: Hydrogel to wound bed (to keep foam from sticking), 1 piece of black sponge to undermining cut into triangle and wound bed, tracked to RLQ of abdomen (2 Piece)   Secondary Dressing: Sealed with drape, NPWT resumed at 125 mmHg continuous with no leaks noted. Used zipper method to help expedite wound closure   Other: Cannister changed 04/17/2018 by pt (patient dropped wound vac off counter and lost suction.       Patient Education: Patient very anxious and eager  for wound vac to be off.  Patient and RN agreed to try the zipper method with the NPWT to see if it will help granulation and hopeful get patient to his goal of removal of VAC.      04/18: Discussed POC and wound progress with patient and Dr. Mac, who popped in to appointment to assess wound progress, and visualize wound bed in person. Patient nervous about no longer being on antibiotics. Patient requesting CBC to check for infection even though there are no s/s. Per Dr. Mac, patient can have that drawn at his office at next appointment if pt wishes. Patient anxious to remove wound vac, instructed patient that it may still be about 2 weeks before wound is at a stage where wound vac can be removed. Pt in agreement. Reviewed s/s infection and when to present to ED. Pt verbalizes understanding of all instruction.     Professional Collaboration:       Assessment:      Wound etiology: Surgical     Wound Progress: Wound smaller per measurement    Rationale for Treatment:  Hydrogel to base of wound for ease of sponge removal, NPWT to absorb exudate, maintain moist wound environment, contraction, encourage granulation tissue    Patient tolerance/compliance: Pt tolerated treatment moderately well. Remains anxious to have wound vac removed.     Complicating factors: Wound location    Need for ongoing Advanced Wound Care services:Patient requires skilled therapeutic wound care services for product selection, application of product, debridement, close monitoring with clinical assessment to expedite of wound healing.         Plan:      Treatment Plan and Recommendations:  Diagnosis/ICD10:   L03.314 (ICD-10-CM) - Cellulitis of groin   N49.2 (ICD-10-CM) - Scrotal infection       Procedures/CPT: NPWT <50 cm - 03315    Frequency: 3x/week - 60 minutes      Treatment Goals: STG 2 Weeks  LTG 4 Weeks   Granulation Tissue: 100% 100%   Decrease Necrotic Tissue to: 0% 0%   Wound Phase:  Proliferation Proliferation   Decrease Size by: 5%  10%   Periwound:  Intact Intact   Decrease tracts/undermining by: 5% 10%   Decrease Pain:  3/10 1/10       At the time of each visit a thorough assessment of the patient is completed to assure the  appropriateness of our plan of care.  The dressings or modalities may need to be adapted   from the original plan to address any significant changes in the wound environment.          Clinician Signature:_______________________________Date__________________      Physician Signature:______________________________Date:__________________

## 2018-04-23 ENCOUNTER — NON-PROVIDER VISIT (OUTPATIENT)
Dept: WOUND CARE | Facility: MEDICAL CENTER | Age: 63
End: 2018-04-23
Attending: HOSPITALIST
Payer: MEDICARE

## 2018-04-23 PROCEDURE — 97597 DBRDMT OPN WND 1ST 20 CM/<: CPT

## 2018-04-23 NOTE — WOUND TEAM
"Advanced Wound Care  [facility]  [address]  [address]  [place] [address]  [phone] Fax: [phone]      Encounter Note  For Certification Period: 04/06/2018 - 06/26/2018  Start of Care: 04/06/2018      Referring Physician: Bettie Hanson M.D.  Primary Physician:  Marylin Barry M.D.      Consulting Physicians:         Wound(s): R scrotum  Pharmacy of Choice:        Subjective:        HPI: Pt is a 63 year old male who presents with a wound vac in place to R scrotum. Patient states that he has had a hydrocele in his scrotum \"for years\" and had been told that this condition usually resolves on its own. Pt recently also developed an inguinal hernia and decided to have hernia repair as well as hydrocele removal surgery at the same time on Feb 26, 2018. Patient had post op follow up with Urologist about a month after surgery. Urologist noted increased swelling to R scrotum, and decided to drain area and sent patient home with antibiotics. A few days later patient noted increased erythema and pain to area and presented to Urologist for check up. At this appointment it was felt that s/s infection were worsening and patient needed to be admitted for IV antibiotics. While admitted, patient had I&D of R scrotum with wound vac placement by Dr. Hi Mac. Referred to [facility] for management.     Pain:  Pt denies pain except with dressing changes.  Liquid lidocaine into sponge with 10 minute dwell time. Needs occasional breaks with drape and sponge removal.         Current Medications:  Current Outpatient Prescriptions:   •  losartan (COZAAR) 100 MG Tab, Take 1 Tab by mouth every day., Disp: 90 Tab, Rfl: 0    Allergies: No Known Allergies      Objective:      Tests and Measures: None today    Orthotic, protective, supportive devices: none    Fall Risk Assessment (oumou all that apply with an X):  Completed at initial eval on 04/06/2018        Wound Characteristics                                          "           Location: R scrotum   Initial Evaluation  Date: 04/06/2018   Encounter Note: 4/20/2018 Encounter Date: 04/23/18   Tissue Type and %: 60% moist dark red, 40% white (testicle?) 90% moist red granular, 10% marbled white  60% moist red granular; 40% marbled fibrous/tough white   Periwound: Intact Intact Intact, mild induration   Drainage: Heavy sanguinous - Cannister changed 04/06/2018 Scant ss - Cannister changed by patient on 04/17/2018 Min ss   Exposed structures Testicle? Fascia none   Wound Edges:   Open Open open   Odor: None None none   S&S of Infection:   None, patient on abx None none   Edema: Local Local local   Sensation: Intact Intact intact               Measurements: R scrotum Initial Evaluation  Date: 04/06/2018   Encounter Note 04/11/2018 Encounter Date: 04/18/2018   Length (cm) 4.5 3.5 3.0   Width (cm) 3.0 2.7 1.5   Depth (cm) 2.3 1.9 1.2   Area (cm2) 13.5 cm2 9.45cm2 4.5 cm2    Tract/undermine Tracts:              1:00 - 2.0cm               5:00 - 3.1cm                7:00 - 1.6cm Undermining -   1:00 - 0.5  5:00 - 1.6  7:00 - 0.8 Tracts:  5:00 - 1.5 cm  7:00 - 0.7 cm        Procedures:   Liquid Lidocaine into sponge with 10 minute dwell time prior to sponge removal. Sponge removed by clinician, wound bed inspected, no sponge retained in wound bed.  Adhesive remover used.   Debridement :  CSWD with scalpel to remove ~2cm2 of adherent yellow/white slough from wound bed   Cleansed with:   NS/gauze                                                                   Periwound protected with: No sting skin prep, drape to periwound and tracked up to abdomen RLQ iliac fossa   Primary dressing: Hydrogel to wound bed (to keep foam from sticking), 1 piece of black sponge to undermining cut into triangle and wound bed, tracked to RLQ of abdomen (2 Pieces black foam used)   Secondary Dressing: Sealed with drape, NPWT resumed at 125 mmHg continuous with no leaks noted.   Other: Cannister changed today.      Patient Education: Patient very anxious and eager for wound vac to be off.  Reviewed operation of vac and troubleshooting.    04/18: Discussed POC and wound progress with patient and Dr. Mac, who popped in to appointment to assess wound progress, and visualize wound bed in person. Patient nervous about no longer being on antibiotics. Patient requesting CBC to check for infection even though there are no s/s. Per Dr. Mac, patient can have that drawn at his office at next appointment if pt wishes. Patient anxious to remove wound vac, instructed patient that it may still be about 2 weeks before wound is at a stage where wound vac can be removed. Pt in agreement. Reviewed s/s infection and when to present to ED. Pt verbalizes understanding of all instruction.     Professional Collaboration: none today      Assessment:      Wound etiology: Surgical     Wound Progress: no measurement today.    Rationale for Treatment:  Hydrogel to base of wound for ease of sponge removal, NPWT to absorb exudate, maintain moist wound environment, contraction, encourage granulation tissue    Patient tolerance/compliance: Pt tolerated treatment moderately well. Remains anxious to have wound vac removed.     Complicating factors: Wound location    Need for ongoing Advanced Wound Care services:Patient requires skilled therapeutic wound care services for product selection, application of product, debridement, close monitoring with clinical assessment to expedite of wound healing.       Plan:      Treatment Plan and Recommendations:  Diagnosis/ICD10:   L03.314 (ICD-10-CM) - Cellulitis of groin   N49.2 (ICD-10-CM) - Scrotal infection       Procedures/CPT: cswd [id number]    Frequency: 3x/week - 60 minutes      Treatment Goals: STG 2 Weeks  LTG 4 Weeks   Granulation Tissue: 100% 100%   Decrease Necrotic Tissue to: 0% 0%   Wound Phase:  Proliferation Proliferation   Decrease Size by: 60% 85%   Periwound:  Intact Intact   Decrease tracts/undermining  by: 100% 100%   Decrease Pain:  3/10 1/10       At the time of each visit a thorough assessment of the patient is completed to assure the  appropriateness of our plan of care.  The dressings or modalities may need to be adapted   from the original plan to address any significant changes in the wound environment.          Clinician Signature:_______________________________Date__________________      Physician Signature:______________________________Date:__________________

## 2018-04-25 ENCOUNTER — NON-PROVIDER VISIT (OUTPATIENT)
Dept: WOUND CARE | Facility: MEDICAL CENTER | Age: 63
End: 2018-04-25
Attending: HOSPITALIST
Payer: MEDICARE

## 2018-04-25 PROCEDURE — 97597 DBRDMT OPN WND 1ST 20 CM/<: CPT

## 2018-04-25 NOTE — WOUND TEAM
"Advanced Wound Care  [facility]  [address]  [address]  [place] [address]  [phone] Fax: [phone]      Encounter Note  For Certification Period: 04/06/2018 - 06/26/2018  Start of Care: 04/06/2018      Referring Physician: Bettie Hanson M.D.  Primary Physician:  Marylin Barry M.D.      Consulting Physicians:         Wound(s): R scrotum  Pharmacy of Choice:        Subjective:        HPI: Pt is a 63 year old male who presents with a wound vac in place to R scrotum. Patient states that he has had a hydrocele in his scrotum \"for years\" and had been told that this condition usually resolves on its own. Pt recently also developed an inguinal hernia and decided to have hernia repair as well as hydrocele removal surgery at the same time on Feb 26, 2018. Patient had post op follow up with Urologist about a month after surgery. Urologist noted increased swelling to R scrotum, and decided to drain area and sent patient home with antibiotics. A few days later patient noted increased erythema and pain to area and presented to Urologist for check up. At this appointment it was felt that s/s infection were worsening and patient needed to be admitted for IV antibiotics. While admitted, patient had I&D of R scrotum with wound vac placement by Dr. Hi Mac. Referred to [facility] for management.     Pain:  Pt denies pain except with dressing changes.  Liquid lidocaine into sponge with 10 minute dwell time. Needs occasional breaks with drape and sponge removal.         Current Medications:  Current Outpatient Prescriptions:   •  losartan (COZAAR) 100 MG Tab, Take 1 Tab by mouth every day., Disp: 90 Tab, Rfl: 0    Allergies: No Known Allergies      Objective:      Tests and Measures: None today    Orthotic, protective, supportive devices: none    Fall Risk Assessment (oumou all that apply with an X):  Completed at initial eval on 04/06/2018        Wound Characteristics                                          "           Location: R scrotum   Initial Evaluation  Date: 04/06/2018   Encounter Date: 04/25/18   Tissue Type and %: 60% moist dark red, 40% white (testicle?) 70% moist red granular; 30% marbled fibrous/tough white   Periwound: Intact Intact, mild induration   Drainage: Heavy sanguinous - Cannister changed 04/06/2018 Scant ss   Exposed structures Testicle? None   Wound Edges:   Open Open   Odor: None None   S&S of Infection:   None, patient on abx None   Edema: Local Local   Sensation: Intact Intact               Measurements: R scrotum Initial Evaluation  Date: 04/06/2018   Encounter Date: 04/25/2018   Length (cm) 4.5 2.4   Width (cm) 3.0 1.0   Depth (cm) 2.3 0.7   Area (cm2) 13.5 cm2 2.4 cm2    Tract/undermine Tracts:              1:00 - 2.0cm               5:00 - 3.1cm                7:00 - 1.6cm None noted        Procedures:   Liquid Lidocaine into sponge with 10 minute dwell time prior to sponge removal. Sponge removed by clinician, wound bed inspected, no sponge retained in wound bed.  Adhesive remover used.   Debridement :  CSWD with curette to remove ~2cm2 of adherent yellow/white slough from wound bed   Cleansed with:   NS/gauze                                                                   Periwound protected with: No sting skin prep, drape to periwound and tracked up to abdomen RLQ iliac fossa   Primary dressing: Hydrogel to wound bed (to keep foam from sticking), 1 piece of black sponge to undermining cut into triangle and wound bed, 1 piece black foam tracked to RLQ of abdomen (2 Pieces black foam used)   Secondary Dressing: Sealed with drape, NPWT resumed at 125 mmHg continuous with no leaks noted.   Other: Cannister changed today.     Patient Education: Patient very anxious and eager for wound vac to be off.  Reviewed operation of vac and troubleshooting. Reviewed s/s infection and when to present to ED.     04/18: Discussed POC and wound progress with patient and Dr. Mac, who popped in to  appointment to assess wound progress, and visualize wound bed in person. Patient nervous about no longer being on antibiotics. Patient requesting CBC to check for infection even though there are no s/s. Per Dr. Mac, patient can have that drawn at his office at next appointment if pt wishes. Patient anxious to remove wound vac, instructed patient that it may still be about 2 weeks before wound is at a stage where wound vac can be removed. Pt in agreement. Reviewed s/s infection and when to present to ED. Pt verbalizes understanding of all instruction.     Professional Collaboration: none today      Assessment:      Wound etiology: Surgical     Wound Progress: Wound smaller per measurement, no tracts appreciated today.     Rationale for Treatment:  Hydrogel to base of wound for ease of sponge removal, NPWT to absorb exudate, maintain moist wound environment, contraction, encourage granulation tissue    Patient tolerance/compliance: Pt tolerated treatment moderately well. Remains anxious to have wound vac removed.     Complicating factors: Wound location    Need for ongoing Advanced Wound Care services:Patient requires skilled therapeutic wound care services for product selection, application of product, debridement, close monitoring with clinical assessment to expedite of wound healing.       Plan:      Treatment Plan and Recommendations:  Diagnosis/ICD10:   L03.314 (ICD-10-CM) - Cellulitis of groin   N49.2 (ICD-10-CM) - Scrotal infection       Procedures/CPT: CSWD >20 cm 54036    Frequency: 3x/week - 60 minutes      Treatment Goals: STG 2 Weeks  LTG 4 Weeks   Granulation Tissue: 100% 100%   Decrease Necrotic Tissue to: 0% 0%   Wound Phase:  Proliferation Proliferation   Decrease Size by: 60% 85%   Periwound:  Intact Intact   Decrease tracts/undermining by: 100% 100%   Decrease Pain:  3/10 1/10       At the time of each visit a thorough assessment of the patient is completed to assure the  appropriateness of our plan  of care.  The dressings or modalities may need to be adapted   from the original plan to address any significant changes in the wound environment.          Clinician Signature:_______________________________Date__________________      Physician Signature:______________________________Date:__________________

## 2018-04-27 ENCOUNTER — NON-PROVIDER VISIT (OUTPATIENT)
Dept: WOUND CARE | Facility: MEDICAL CENTER | Age: 63
End: 2018-04-27
Attending: HOSPITALIST
Payer: MEDICARE

## 2018-04-27 PROCEDURE — 97605 NEG PRS WND THER DME<=50SQCM: CPT

## 2018-04-27 NOTE — WOUND TEAM
"Advanced Wound Care  [facility]  [address]  [address]  [place] [address]  [phone] Fax: [phone]      Encounter Note  For Certification Period: 04/06/2018 - 06/26/2018  Start of Care: 04/06/2018      Referring Physician: Bettie Hanson M.D.  Primary Physician:  Marylin Barry M.D.      Consulting Physicians:         Wound(s): R scrotum  Pharmacy of Choice:        Subjective:        HPI: Pt is a 63 year old male who presents with a wound vac in place to R scrotum. Patient states that he has had a hydrocele in his scrotum \"for years\" and had been told that this condition usually resolves on its own. Pt recently also developed an inguinal hernia and decided to have hernia repair as well as hydrocele removal surgery at the same time on Feb 26, 2018. Patient had post op follow up with Urologist about a month after surgery. Urologist noted increased swelling to R scrotum, and decided to drain area and sent patient home with antibiotics. A few days later patient noted increased erythema and pain to area and presented to Urologist for check up. At this appointment it was felt that s/s infection were worsening and patient needed to be admitted for IV antibiotics. While admitted, patient had I&D of R scrotum with wound vac placement by Dr. Hi Mac. Referred to [facility] for management.     Pain:  Pt denies pain except with dressing changes.  Liquid lidocaine into sponge with 10 minute dwell time.     Current Medications:  Current Outpatient Prescriptions:   •  losartan (COZAAR) 100 MG Tab, Take 1 Tab by mouth every day., Disp: 90 Tab, Rfl: 0    Allergies: No Known Allergies      Objective:      Tests and Measures: None today    Orthotic, protective, supportive devices: none    Fall Risk Assessment (oumou all that apply with an X):  Completed at initial eval on 04/06/2018        Wound Characteristics                                                    Location: R scrotum   Initial Evaluation  Date: " 04/06/2018   Encounter Date: 04/27/18   Tissue Type and %: 60% moist dark red, 40% white (testicle?) 100% moist red granular   Periwound: Intact Intact   Drainage: Heavy sanguinous - Cannister changed 04/06/2018 Scant ss   Exposed structures Testicle? None   Wound Edges:   Open Open   Odor: None None   S&S of Infection:   None, patient on abx None   Edema: Local Local   Sensation: Intact Intact               Measurements: R scrotum Initial Evaluation  Date: 04/06/2018   Encounter Date: 04/25/2018   Length (cm) 4.5 2.4   Width (cm) 3.0 1.0   Depth (cm) 2.3 0.7   Area (cm2) 13.5 cm2 2.4 cm2    Tract/undermine Tracts:              1:00 - 2.0cm               5:00 - 3.1cm                7:00 - 1.6cm None noted        Procedures:   Liquid Lidocaine into sponge with 10 minute dwell time prior to sponge removal. Sponge removed by clinician, wound bed inspected, no sponge retained in wound bed.  Adhesive remover used.   Debridement :  Non-selective with moist gauze   Cleansed with:   NS/gauze                                                                   Periwound protected with: skin prep, drape to periwound and tracked up to abdomen RLQ   Primary dressing: Medihoney gel, 1 piece of black sponge to wick wound bed, 1 piece black foam under tract pad (2 Pieces black foam used)   Secondary Dressing: Sealed with drape, NPWT resumed at 125 mmHg continuous with no leaks noted.   Other:      Patient Education: Patient very anxious and eager for wound vac to be off.  Reviewed operation of vac and troubleshooting. Reviewed s/s infection and when to present to ED, as well as how to remove dressing & replace with damp gauze if needed for unfixable leak.    04/18: Discussed POC and wound progress with patient and Dr. Mac, who popped in to appointment to assess wound progress, and visualize wound bed in person. Patient nervous about no longer being on antibiotics. Patient requesting CBC to check for infection even though there are  no s/s. Per Dr. Mac, patient can have that drawn at his office at next appointment if pt wishes. Patient anxious to remove wound vac, instructed patient that it may still be about 2 weeks before wound is at a stage where wound vac can be removed. Pt in agreement. Reviewed s/s infection and when to present to ED. Pt verbalizes understanding of all instruction.     Professional Collaboration: none today      Assessment:      Wound etiology: Surgical     Wound Progress: Wound smaller per measurement, no tracts appreciated today.     Rationale for Treatment:  Gel to base of wound for ease of sponge removal & autolytic debridement, NPWT to absorb exudate, maintain moist wound environment, contraction, encourage granulation tissue    Patient tolerance/compliance: Pt tolerated treatment moderately well. Remains anxious to have wound vac removed.     Complicating factors: Wound location    Need for ongoing Advanced Wound Care services:Patient requires skilled therapeutic wound care services for product selection, application of product, debridement, close monitoring with clinical assessment to expedite of wound healing.       Plan:      Treatment Plan and Recommendations:  Diagnosis/ICD10:   L03.314 (ICD-10-CM) - Cellulitis of groin   N49.2 (ICD-10-CM) - Scrotal infection       Procedures/CPT: CSWD >20 cm 52358    Frequency: 3x/week - 60 minutes      Treatment Goals: STG 2 Weeks  LTG 4 Weeks   Granulation Tissue: 100% 100%   Decrease Necrotic Tissue to: 0% 0%   Wound Phase:  Proliferation Proliferation   Decrease Size by: 60% 85%   Periwound:  Intact Intact   Decrease tracts/undermining by: 100% 100%   Decrease Pain:  3/10 1/10       At the time of each visit a thorough assessment of the patient is completed to assure the  appropriateness of our plan of care.  The dressings or modalities may need to be adapted   from the original plan to address any significant changes in the wound environment.          Clinician  Signature:_______________________________Date__________________      Physician Signature:______________________________Date:__________________

## 2018-04-30 ENCOUNTER — NON-PROVIDER VISIT (OUTPATIENT)
Dept: WOUND CARE | Facility: MEDICAL CENTER | Age: 63
End: 2018-04-30
Attending: HOSPITALIST
Payer: MEDICARE

## 2018-04-30 PROCEDURE — 97605 NEG PRS WND THER DME<=50SQCM: CPT

## 2018-04-30 NOTE — WOUND TEAM
"Advanced Wound Care  [facility]  [address]  [address]  [place] [address]  [phone] Fax: [phone]      Encounter Note  For Certification Period: 04/06/2018 - 06/26/2018  Start of Care: 04/06/2018      Referring Physician: Bettie Hanson M.D.  Primary Physician:  Marylin Barry M.D.      Consulting Physicians:         Wound(s): R scrotum  Pharmacy of Choice:        Subjective:        HPI: Pt is a 63 year old male who presents with a wound vac in place to R scrotum. Patient states that he has had a hydrocele in his scrotum \"for years\" and had been told that this condition usually resolves on its own. Pt recently also developed an inguinal hernia and decided to have hernia repair as well as hydrocele removal surgery at the same time on Feb 26, 2018. Patient had post op follow up with Urologist about a month after surgery. Urologist noted increased swelling to R scrotum, and decided to drain area and sent patient home with antibiotics. A few days later patient noted increased erythema and pain to area and presented to Urologist for check up. At this appointment it was felt that s/s infection were worsening and patient needed to be admitted for IV antibiotics. While admitted, patient had I&D of R scrotum with wound vac placement by Dr. Hi Mac. Referred to [facility] for management.     Pain:  Pt denies pain today.  Topical lidocaine 2% applied prior to treatment with 5 minute dwell time.     Current Medications:  Current Outpatient Prescriptions:   •  losartan (COZAAR) 100 MG Tab, Take 1 Tab by mouth every day., Disp: 90 Tab, Rfl: 0    Allergies: No Known Allergies      Objective:      Tests and Measures: None today    Orthotic, protective, supportive devices: none    Fall Risk Assessment (oumou all that apply with an X):  Completed at initial eval on 04/06/2018        Wound Characteristics                                                    Location: R scrotum   Initial Evaluation  Date: " 2018   Encounter Date: 18   Tissue Type and %: 60% moist dark red, 40% white (testicle?) 80% moist red, 20% marbled fibrous pink   Periwound: Intact Intact, mild erythema   Drainage: Heavy sanguinous - Cannister changed 2018 Scant ss   Exposed structures Testicle? None   Wound Edges:   Open Open   Odor: None None   S&S of Infection:   None, patient on abx None (mild erythema, but no increase in pain, no increase in edema, no warmth, no odor, pt feeling good)   Edema: Local Local   Sensation: Intact Intact               Measurements: R scrotum Initial Evaluation  Date: 2018   Encounter Date: 2018   Length (cm) 4.5 2.4   Width (cm) 3.0 1.0   Depth (cm) 2.3 0.7   Area (cm2) 13.5 cm2 2.4 cm2    Tract/undermine Tracts:              1:00 - 2.0cm               5:00 - 3.1cm                7:00 - 1.6cm None noted        Procedures:   Sponge removed by clinician, wound bed inspected, no sponge retained in wound bed.  Adhesive remover used.   Debridement :  None today   Cleansed with:   NS/gauze                                                                   Periwound protected with: skin prep, drape to periwound and tracked up to abdomen RLQ   Primary dressin piece of black sponge to wick wound bed, 1 piece black foam under tract pad (2 Pieces black foam used)   Secondary Dressing: Sealed with drape, NPWT resumed at 125 mmHg continuous with no leaks noted.   Other:      Patient Education: Patient very anxious and eager for wound vac to be off.  We discussed the possibility of removing it later this week, pros and cons of removing vs continuing with vac thoroughly discussed.  Pt with many questions and plenty of time allowed for answering all.    Professional Collaboration: none today      Assessment:      Wound etiology: Surgical     Wound Progress: no measurement today.  Visibly smaller than last week.    Rationale for Treatment:  NPWT to absorb exudate, maintain moist wound environment,  contraction, encourage granulation tissue    Patient tolerance/compliance: Pt tolerated treatment moderately well. Remains anxious to have wound vac removed.     Complicating factors: Wound location    Need for ongoing Advanced Wound Care services:Patient requires skilled therapeutic wound care services for product selection, application of product, debridement, close monitoring with clinical assessment to expedite of wound healing.       Plan:      Treatment Plan and Recommendations:  Diagnosis/ICD10:   L03.314 (ICD-10-CM) - Cellulitis of groin   N49.2 (ICD-10-CM) - Scrotal infection       Procedures/CPT: npwt<50cm 96105  Frequency: 3x/week - 60 minutes      Treatment Goals: STG 2 Weeks  LTG 4 Weeks   Granulation Tissue: 100% resolved   Decrease Necrotic Tissue to: 0% 0%   Wound Phase:  Proliferation maturation   Decrease Size by: 75% 100%   Periwound:  Intact Intact   Decrease tracts/undermining by: 100% 100%   Decrease Pain:  3/10 1/10       At the time of each visit a thorough assessment of the patient is completed to assure the  appropriateness of our plan of care.  The dressings or modalities may need to be adapted   from the original plan to address any significant changes in the wound environment.          Clinician Signature:_______________________________Date__________________      Physician Signature:______________________________Date:__________________

## 2018-05-02 ENCOUNTER — NON-PROVIDER VISIT (OUTPATIENT)
Dept: WOUND CARE | Facility: MEDICAL CENTER | Age: 63
End: 2018-05-02
Attending: HOSPITALIST
Payer: MEDICARE

## 2018-05-02 PROCEDURE — 97605 NEG PRS WND THER DME<=50SQCM: CPT

## 2018-05-02 NOTE — WOUND TEAM
"Advanced Wound Care  [facility]  [address]  [address]  [place] [address]  [phone] Fax: [phone]      Encounter Note  For Certification Period: 04/06/2018 - 06/26/2018  Start of Care: 04/06/2018      Referring Physician: Bettie Hanson M.D.  Primary Physician:  Marylin Barry M.D.      Consulting Physicians:         Wound(s): R scrotum  Pharmacy of Choice:        Subjective:        HPI: Pt is a 63 year old male who presents with a wound vac in place to R scrotum. Patient states that he has had a hydrocele in his scrotum \"for years\" and had been told that this condition usually resolves on its own. Pt recently also developed an inguinal hernia and decided to have hernia repair as well as hydrocele removal surgery at the same time on Feb 26, 2018. Patient had post op follow up with Urologist about a month after surgery. Urologist noted increased swelling to R scrotum, and decided to drain area and sent patient home with antibiotics. A few days later patient noted increased erythema and pain to area and presented to Urologist for check up. At this appointment it was felt that s/s infection were worsening and patient needed to be admitted for IV antibiotics. While admitted, patient had I&D of R scrotum with wound vac placement by Dr. Hi Mac. Referred to [facility] for management.     Pain:  Pt denies pain today.  Topical lidocaine 2% applied prior to treatment with 5 minute dwell time.     Current Medications:  Current Outpatient Prescriptions:   •  losartan (COZAAR) 100 MG Tab, Take 1 Tab by mouth every day., Disp: 90 Tab, Rfl: 0    Allergies: No Known Allergies      Objective:      Tests and Measures: None today    Orthotic, protective, supportive devices: none    Fall Risk Assessment (oumou all that apply with an X):  Completed at initial eval on 04/06/2018        Wound Characteristics                                                    Location: R scrotum   Initial Evaluation  Date: " 2018   Encounter Date: 18   Tissue Type and %: 60% moist dark red, 40% white (testicle?) 95% moist red, 5% adherent yellow   Periwound: Intact Intact, mild erythema (appears to be mild irritation from drape)   Drainage: Heavy sanguinous - Cannister changed 2018 Scant ss   Exposed structures Testicle? None   Wound Edges:   Open Open   Odor: None None   S&S of Infection:   None, patient on abx None    Edema: Local Local   Sensation: Intact Intact               Measurements: R scrotum Initial Evaluation  Date: 2018   Encounter Date: 2018   Length (cm) 4.5 1.9   Width (cm) 3.0 1.0   Depth (cm) 2.3 0.5   Area (cm2) 13.5 cm2 1.9 cm2    Tract/undermine Tracts:              1:00 - 2.0cm               5:00 - 3.1cm                7:00 - 1.6cm None noted        Procedures:   Sponge removed by clinician, wound bed inspected, no sponge retained in wound bed.  Adhesive remover used.   Debridement :  None today   Cleansed with:   NS/gauze                                                                   Periwound protected with: skin prep, drape to periwound and tracked up to abdomen RLQ   Primary dressin piece of black sponge to wick wound bed, 1 piece black foam under tract pad (2 Pieces black foam used)   Secondary Dressing: Sealed with drape, NPWT resumed at 125 mmHg continuous with no leaks noted.   Other:      Patient Education: Patient very anxious and eager for wound vac to be off.  We discussed the possibility of removing it on Friday vs 1 more week -  pros and cons of removing vs continuing with vac thoroughly discussed again.  Pt with good understanding    Professional Collaboration: none today      Assessment:      Wound etiology: Surgical     Wound Progress: smaller per measurements    Rationale for Treatment:  NPWT to absorb exudate, maintain moist wound environment, contraction, encourage granulation tissue    Patient tolerance/compliance: Pt tolerated treatment moderately well.  Remains anxious to have wound vac removed.     Complicating factors: Wound location    Need for ongoing Advanced Wound Care services:Patient requires skilled therapeutic wound care services for product selection, application of product, debridement, close monitoring with clinical assessment to expedite of wound healing.       Plan:      Treatment Plan and Recommendations:  Diagnosis/ICD10:   L03.314 (ICD-10-CM) - Cellulitis of groin   N49.2 (ICD-10-CM) - Scrotal infection       Procedures/CPT: npwt<50cm 32039  Frequency: 3x/week - 60 minutes      Treatment Goals: STG 2 Weeks  LTG 4 Weeks   Granulation Tissue: 100% resolved   Decrease Necrotic Tissue to: 0% 0%   Wound Phase:  Proliferation maturation   Decrease Size by: 75% 100%   Periwound:  Intact Intact   Decrease tracts/undermining by: 100% 100%   Decrease Pain:  3/10 1/10       At the time of each visit a thorough assessment of the patient is completed to assure the  appropriateness of our plan of care.  The dressings or modalities may need to be adapted   from the original plan to address any significant changes in the wound environment.          Clinician Signature:_______________________________Date__________________      Physician Signature:______________________________Date:__________________

## 2018-05-04 ENCOUNTER — NON-PROVIDER VISIT (OUTPATIENT)
Dept: WOUND CARE | Facility: MEDICAL CENTER | Age: 63
End: 2018-05-04
Attending: HOSPITALIST
Payer: MEDICARE

## 2018-05-04 PROCEDURE — 97602 WOUND(S) CARE NON-SELECTIVE: CPT

## 2018-05-04 PROCEDURE — 99213 OFFICE O/P EST LOW 20 MIN: CPT

## 2018-05-04 NOTE — WOUND TEAM
"Advanced Wound Care  [facility]  [address]  [address]  [place] [address]  [phone] Fax: [phone]      Encounter Note  For Certification Period: 04/06/2018 - 06/26/2018  Start of Care: 04/06/2018      Referring Physician: Bettie Hanson M.D.  Primary Physician:  Marylin Barry M.D.      Consulting Physicians:         Wound(s): R scrotum  Pharmacy of Choice:        Subjective:        HPI: Pt is a 63 year old male who presents with a wound vac in place to R scrotum. Patient states that he has had a hydrocele in his scrotum \"for years\" and had been told that this condition usually resolves on its own. Pt recently also developed an inguinal hernia and decided to have hernia repair as well as hydrocele removal surgery at the same time on Feb 26, 2018. Patient had post op follow up with Urologist about a month after surgery. Urologist noted increased swelling to R scrotum, and decided to drain area and sent patient home with antibiotics. A few days later patient noted increased erythema and pain to area and presented to Urologist for check up. At this appointment it was felt that s/s infection were worsening and patient needed to be admitted for IV antibiotics. While admitted, patient had I&D of R scrotum with wound vac placement by Dr. Hi Mac. Referred to [facility] for management.     Pain:  Pt denies pain today.      Current Medications:  Current Outpatient Prescriptions:   •  losartan (COZAAR) 100 MG Tab, Take 1 Tab by mouth every day., Disp: 90 Tab, Rfl: 0    Allergies: No Known Allergies      Objective:      Tests and Measures: None today    Orthotic, protective, supportive devices: none    Fall Risk Assessment (oumou all that apply with an X):  Completed at initial eval on 04/06/2018        Wound Characteristics                                                    Location: R scrotum   Initial Evaluation  Date: 04/06/2018   Encounter Date: 05/04/2018   Tissue Type and %: 60% moist dark " red, 40% white (testicle?) 95% moist red, 5% adherent yellow   Periwound: Intact Intact, mild drape irritation   Drainage: Heavy sanguinous - Cannister changed 04/06/2018 Scant ss   Exposed structures Testicle? None   Wound Edges:   Open Open   Odor: None None   S&S of Infection:   None, patient on abx None    Edema: Local Local   Sensation: Intact Intact               Measurements: R scrotum Initial Evaluation  Date: 04/06/2018   Encounter Date: 05/02/2018   Length (cm) 4.5 1.9   Width (cm) 3.0 1.0   Depth (cm) 2.3 0.5   Area (cm2) 13.5 cm2 1.9 cm2    Tract/undermine Tracts:              1:00 - 2.0cm               5:00 - 3.1cm                7:00 - 1.6cm None noted       *Vac break started 05/04/2018    Liquid Lidocaine into sponge tubing with ~10 minute dwell time prior to sponge removal.    Procedures:   Sponge removed by clinician, wound bed inspected, no sponge retained in wound bed.  Adhesive remover used.   Debridement :  None today   Cleansed with:   NS/gauze                                                                   Periwound protected with: Skin prep   Primary dressing:  Anna   Secondary Dressing: Non adhesive foam, tegaderm   Other:      Patient Education: POC and rationale for dressing selection and for starting vac break. Instructed patient to bring wound vac with him to next appointment in case it is decided that it will be reapplied at the next visit. Reviewed s/s infection and when to present to the ED.  Pt with good understanding    Professional Collaboration: None today      Assessment:      Wound etiology: Surgical     Wound Progress: Wound appears to continue to contract. Wound vac break started today.     Rationale for Treatment: Anna to combat chronic inflammation and provide collagen scaffolding to promote granulation. Non adhesive foam for absorption, tegaderm for water resistance.     Patient tolerance/compliance: Pt tolerated treatment moderately well. Remains anxious to have  wound vac removed.     Complicating factors: Wound location    Need for ongoing Advanced Wound Care services:Patient requires skilled therapeutic wound care services for product selection, application of product, debridement, close monitoring with clinical assessment to expedite of wound healing.       Plan:      Treatment Plan and Recommendations:  Diagnosis/ICD10:   L03.314 (ICD-10-CM) - Cellulitis of groin   N49.2 (ICD-10-CM) - Scrotal infection       Procedures/CPT: Non selective debridement - 69903    Frequency: 3x/week - 60 minutes.      Treatment Goals: STG 2 Weeks  LTG 4 Weeks   Granulation Tissue: 100% Resolved   Decrease Necrotic Tissue to: 0% 0%   Wound Phase:  Proliferation Maturation   Decrease Size by: 75% 100%   Periwound:  Intact Intact   Decrease tracts/undermining by: 100% 100%   Decrease Pain:  3/10 1/10       At the time of each visit a thorough assessment of the patient is completed to assure the  appropriateness of our plan of care.  The dressings or modalities may need to be adapted   from the original plan to address any significant changes in the wound environment.          Clinician Signature:_______________________________Date__________________      Physician Signature:______________________________Date:__________________

## 2018-05-07 ENCOUNTER — NON-PROVIDER VISIT (OUTPATIENT)
Dept: WOUND CARE | Facility: MEDICAL CENTER | Age: 63
End: 2018-05-07
Attending: HOSPITALIST
Payer: MEDICARE

## 2018-05-07 PROCEDURE — 99213 OFFICE O/P EST LOW 20 MIN: CPT

## 2018-05-07 PROCEDURE — 97602 WOUND(S) CARE NON-SELECTIVE: CPT

## 2018-05-07 NOTE — WOUND TEAM
"Advanced Wound Care  [facility]  [address]  [address]  [place] [address]  [phone] Fax: [phone]      Encounter Note  For Certification Period: 04/06/2018 - 06/26/2018  Start of Care: 04/06/2018      Referring Physician: Bettie Hanson M.D.  Primary Physician:  Marylin Barry M.D.      Consulting Physicians:         Wound(s): R scrotum  Pharmacy of Choice:        Subjective:        HPI: Pt is a 63 year old male who presents with a wound vac in place to R scrotum. Patient states that he has had a hydrocele in his scrotum \"for years\" and had been told that this condition usually resolves on its own. Pt recently also developed an inguinal hernia and decided to have hernia repair as well as hydrocele removal surgery at the same time on Feb 26, 2018. Patient had post op follow up with Urologist about a month after surgery. Urologist noted increased swelling to R scrotum, and decided to drain area and sent patient home with antibiotics. A few days later patient noted increased erythema and pain to area and presented to Urologist for check up. At this appointment it was felt that s/s infection were worsening and patient needed to be admitted for IV antibiotics. While admitted, patient had I&D of R scrotum with wound vac placement by Dr. Hi Mac. Referred to [facility] for management.     Pain:  Pt denies pain today.      Current Medications:  Current Outpatient Prescriptions:   •  losartan (COZAAR) 100 MG Tab, Take 1 Tab by mouth every day., Disp: 90 Tab, Rfl: 0    Allergies: No Known Allergies      Objective:      Tests and Measures: None today    Orthotic, protective, supportive devices: none    Fall Risk Assessment (oumou all that apply with an X):  Completed at initial eval on 04/06/2018        Wound Characteristics                                                    Location: R scrotum   Initial Evaluation  Date: 04/06/2018   Encounter Date: 05/07/2018   Tissue Type and %: 60% moist dark " red, 40% white (testicle?) 75% moist red, 25% adherent yellow   Periwound: Intact Intact, mild drape irritation   Drainage: Heavy sanguinous - Cannister changed 04/06/2018 Mod ss   Exposed structures Testicle? None   Wound Edges:   Open Open   Odor: None None   S&S of Infection:   None, patient on abx Mild erythema and edema bri wound - probable residual irritation from VAC drape    Edema: Local Local   Sensation: Intact Intact               Measurements: R scrotum Initial Evaluation  Date: 04/06/2018   Encounter Date: 05/02/2018   Length (cm) 4.5 1.9   Width (cm) 3.0 1.0   Depth (cm) 2.3 0.5   Area (cm2) 13.5 cm2 1.9 cm2    Tract/undermine Tracts:              1:00 - 2.0cm               5:00 - 3.1cm                7:00 - 1.6cm None noted       *Vac break started 05/04/2018       Procedures:                 Debridement :  None today   Cleansed with:   NS/gauze                                                                   Periwound protected with: Skin prep, zinc barrier paste   Primary dressing:  AqAg with a dab of medihoney   Secondary Dressing: adhesive foam, tegaderm   Other:      Patient Education: POC and rationale for dressing selection. Instructed patient to bring wound vac with him to next appointment in case it is decided that it will be reapplied at the next visit. Reviewed s/s infection and when to present to the ED.  Pt with good understanding    Professional Collaboration: None today      Assessment:      Wound etiology: Surgical     Wound Progress: Wound appears to continue to contract. Increased adherent yellow tissue since stopping VAC    Rationale for Treatment: AqAg to manage bioburden, absorb exudate, and maintain moist wound environment without laterally wicking exudate therefore reducing bri-wound maceration; Medihoney gel to provide moist wound environment, and facilitate autolytic debridement; adhesive foam for absorption, tegaderm for water resistance.     Patient tolerance/compliance:  Pt tolerated treatment moderately well. Remains anxious to have wound vac removed.     Complicating factors: Wound location    Need for ongoing Advanced Wound Care services:Patient requires skilled therapeutic wound care services for product selection, application of product, debridement, close monitoring with clinical assessment to expedite of wound healing.       Plan:      Treatment Plan and Recommendations:  Diagnosis/ICD10:   L03.314 (ICD-10-CM) - Cellulitis of groin   N49.2 (ICD-10-CM) - Scrotal infection       Procedures/CPT: Non selective debridement - 57941    Frequency: 3x/week - 60 minutes.      Treatment Goals: STG 2 Weeks  LTG 4 Weeks   Granulation Tissue: 100% Resolved   Decrease Necrotic Tissue to: 0% 0%   Wound Phase:  Proliferation Maturation   Decrease Size by: 75% 100%   Periwound:  Intact Intact   Decrease tracts/undermining by: 100% 100%   Decrease Pain:  3/10 1/10       At the time of each visit a thorough assessment of the patient is completed to assure the  appropriateness of our plan of care.  The dressings or modalities may need to be adapted   from the original plan to address any significant changes in the wound environment.          Clinician Signature:_______________________________Date__________________      Physician Signature:______________________________Date:__________________

## 2018-05-09 ENCOUNTER — NON-PROVIDER VISIT (OUTPATIENT)
Dept: WOUND CARE | Facility: MEDICAL CENTER | Age: 63
End: 2018-05-09
Attending: HOSPITALIST
Payer: MEDICARE

## 2018-05-09 PROCEDURE — 97602 WOUND(S) CARE NON-SELECTIVE: CPT

## 2018-05-09 NOTE — WOUND TEAM
"Advanced Wound Care  [facility]  [address]  [address]  [place] [address]  [phone] Fax: [phone]      Encounter Note  For Certification Period: 04/06/2018 - 06/26/2018  Start of Care: 04/06/2018      Referring Physician: Bettie Hanson M.D.  Primary Physician:  Marylin Barry M.D.      Consulting Physicians:         Wound(s): R scrotum  Pharmacy of Choice:        Subjective:        HPI: Pt is a 63 year old male who presents with a wound vac in place to R scrotum. Patient states that he has had a hydrocele in his scrotum \"for years\" and had been told that this condition usually resolves on its own. Pt recently also developed an inguinal hernia and decided to have hernia repair as well as hydrocele removal surgery at the same time on Feb 26, 2018. Patient had post op follow up with Urologist about a month after surgery. Urologist noted increased swelling to R scrotum, and decided to drain area and sent patient home with antibiotics. A few days later patient noted increased erythema and pain to area and presented to Urologist for check up. At this appointment it was felt that s/s infection were worsening and patient needed to be admitted for IV antibiotics. While admitted, patient had I&D of R scrotum with wound vac placement by Dr. Hi Mac. Referred to [facility] for management.     Pain:  Pt denies pain today.      Current Medications:  Current Outpatient Prescriptions:   •  losartan (COZAAR) 100 MG Tab, Take 1 Tab by mouth every day., Disp: 90 Tab, Rfl: 0    Allergies: No Known Allergies      Objective:      Tests and Measures: None today    Orthotic, protective, supportive devices: none    Fall Risk Assessment (oumou all that apply with an X):  Completed at initial eval on 04/06/2018        Wound Characteristics                                                    Location: R scrotum   Initial Evaluation  Date: 04/06/2018   Encounter Date: 05/09/2018   Tissue Type and %: 60% moist dark " red, 40% white (testicle?) 100% moist pink   Periwound: Intact Intact, mild tape irritation   Drainage: Heavy sanguinous - Cannister changed 04/06/2018 Minimal serosanguinous    Exposed structures Testicle? None   Wound Edges:   Open Open   Odor: None None   S&S of Infection:   None, patient on abx None   Edema: Local None   Sensation: Intact Intact               Measurements: R scrotum Initial Evaluation  Date: 04/06/2018   Encounter Date: 05/09/2018   Length (cm) 4.5 1.5   Width (cm) 3.0 0.7   Depth (cm) 2.3 0.4   Area (cm2) 13.5 cm2 1.05cm2    Tract/undermine Tracts:              1:00 - 2.0cm               5:00 - 3.1cm                7:00 - 1.6cm None       VAC discontinued 05/09       Procedures:                 Debridement :  Non-selective blunt debridement with NS moisten gauze and cotton tip applicator   Cleansed with:   NS and gauze                                                                  Periwound protected with: Skin prep   Primary dressing:  Aquacel Ag   Secondary Dressing: adhesive foam, tegaderm   Other:      Patient Education: Wound VAC discontinued 05/09, [employer] notified Ref#[id number]    Professional Collaboration: None today      Assessment:      Wound etiology: Surgical     Wound Progress: wound is anna, wound bed is granular and closing.    Rationale for Treatment: AqAg to manage bioburden, absorb exudate, and maintain moist wound environment without laterally wicking exudate therefore reducing bri-wound maceration; Medihoney gel to provide moist wound environment, and facilitate autolytic debridement; adhesive foam for absorption, tegaderm for water resistance.     Patient tolerance/compliance: Pt tolerated treatment moderately well. Remains anxious to have wound vac removed.     Complicating factors: Wound location    Need for ongoing Advanced Wound Care services:Patient requires skilled therapeutic wound care services for product selection, application of product, debridement, close  monitoring with clinical assessment to expedite of wound healing.       Plan:      Treatment Plan and Recommendations:  Diagnosis/ICD10:   L03.314 (ICD-10-CM) - Cellulitis of groin   N49.2 (ICD-10-CM) - Scrotal infection       Procedures/CPT: Non selective debridement - 05526    Frequency: 3x/week - 60 minutes.      Treatment Goals: STG 2 Weeks  LTG 4 Weeks   Granulation Tissue: 100% Resolved   Decrease Necrotic Tissue to: 0% 0%   Wound Phase:  Proliferation Maturation   Decrease Size by: 75% 100%   Periwound:  Intact Intact   Decrease tracts/undermining by: 100% 100%   Decrease Pain:  3/10 1/10       At the time of each visit a thorough assessment of the patient is completed to assure the  appropriateness of our plan of care.  The dressings or modalities may need to be adapted   from the original plan to address any significant changes in the wound environment.          Clinician Signature:_______________________________Date__________________      Physician Signature:______________________________Date:__________________

## 2018-05-11 ENCOUNTER — NON-PROVIDER VISIT (OUTPATIENT)
Dept: WOUND CARE | Facility: MEDICAL CENTER | Age: 63
End: 2018-05-11
Attending: HOSPITALIST
Payer: MEDICARE

## 2018-05-11 PROCEDURE — 97602 WOUND(S) CARE NON-SELECTIVE: CPT

## 2018-05-11 NOTE — WOUND TEAM
"Advanced Wound Care  [facility]  [address]  [address]  [place] [address]  [phone] Fax: [phone]      Encounter Note  For Certification Period: 04/06/2018 - 06/26/2018  Start of Care: 04/06/2018      Referring Physician: Bettie Hanson M.D.  Primary Physician:  Marylin Barry M.D.      Consulting Physicians:         Wound(s): R scrotum  Pharmacy of Choice:        Subjective:        HPI: Pt is a 63 year old male who presents with a wound vac in place to R scrotum. Patient states that he has had a hydrocele in his scrotum \"for years\" and had been told that this condition usually resolves on its own. Pt recently also developed an inguinal hernia and decided to have hernia repair as well as hydrocele removal surgery at the same time on Feb 26, 2018. Patient had post op follow up with Urologist about a month after surgery. Urologist noted increased swelling to R scrotum, and decided to drain area and sent patient home with antibiotics. A few days later patient noted increased erythema and pain to area and presented to Urologist for check up. At this appointment it was felt that s/s infection were worsening and patient needed to be admitted for IV antibiotics. While admitted, patient had I&D of R scrotum with wound vac placement by Dr. Hi Mac. Referred to [facility] for management.     Pain:  Pt denies pain today.      Current Medications:  Current Outpatient Prescriptions:   •  losartan (COZAAR) 100 MG Tab, Take 1 Tab by mouth every day., Disp: 90 Tab, Rfl: 0    Allergies: No Known Allergies      Objective:      Tests and Measures: None today    Orthotic, protective, supportive devices: none    Fall Risk Assessment (oumou all that apply with an X):  Completed at initial eval on 04/06/2018        Wound Characteristics                                                    Location: R scrotum   Initial Evaluation  Date: 04/06/2018   Encounter Date: 05/11/2018   Tissue Type and %: 60% moist dark " red, 40% white (testicle?) 100% moist pink   Periwound: Intact Intact   Drainage: Heavy sanguinous - Cannister changed 04/06/2018 Minimal serosanguinous    Exposed structures Testicle? None   Wound Edges:   Open Open   Odor: None None   S&S of Infection:   None, patient on abx None   Edema: Local None   Sensation: Intact Intact               Measurements: R scrotum Initial Evaluation  Date: 04/06/2018   Encounter Date: 05/09/2018   Length (cm) 4.5 1.5   Width (cm) 3.0 0.7   Depth (cm) 2.3 0.4   Area (cm2) 13.5 cm2 1.05cm2    Tract/undermine Tracts:              1:00 - 2.0cm               5:00 - 3.1cm                7:00 - 1.6cm None       VAC discontinued 05/09   Procedures:                 Debridement :  Non-selective blunt debridement with NS moisten gauze and cotton tip applicator   Cleansed with:   NS and gauze                                                                  Periwound protected with: Skin prep   Primary dressing:  Aquacel Ag   Secondary Dressing: adhesive foam, tegaderm   Other:      Patient Education: Discussed POC and rationale for dressing selection. Pt pleased with wound progress. Pt states that he will be leaving [facility] on 5/17 for 6 days on a road trip. Discussed 7 day dressing options and how to change dressing if it becomes soiled, soaked, etc. Reviewed s/s infection and when to present to ED. Pt verbalizes understanding to all instructions.     Professional Collaboration: None today      Assessment:      Wound etiology: Surgical     Wound Progress: Wound appears to continue to contract.     Rationale for Treatment: AqAg to manage bioburden, absorb exudate, and maintain moist wound environment without laterally wicking exudate therefore reducing bri-wound maceration; Medihoney gel to provide moist wound environment, and facilitate autolytic debridement; adhesive foam for absorption, tegaderm for water resistance.     Patient tolerance/compliance: Pt tolerated treatment moderately well.  Remains anxious to have wound vac removed.     Complicating factors: Wound location    Need for ongoing Advanced Wound Care services:Patient requires skilled therapeutic wound care services for product selection, application of product, debridement, close monitoring with clinical assessment to expedite of wound healing.       Plan:      Treatment Plan and Recommendations:  Diagnosis/ICD10:   L03.314 (ICD-10-CM) - Cellulitis of groin   N49.2 (ICD-10-CM) - Scrotal infection       Procedures/CPT: Non selective debridement - 36086    Frequency: 2x/week - 30 minutes.      Treatment Goals: STG 2 Weeks  LTG 4 Weeks   Granulation Tissue: 100% Resolved   Decrease Necrotic Tissue to: 0% 0%   Wound Phase:  Proliferation Maturation   Decrease Size by: 75% 100%   Periwound:  Intact Intact   Decrease tracts/undermining by: 100% 100%   Decrease Pain:  3/10 1/10       At the time of each visit a thorough assessment of the patient is completed to assure the  appropriateness of our plan of care.  The dressings or modalities may need to be adapted   from the original plan to address any significant changes in the wound environment.          Clinician Signature:_______________________________Date__________________      Physician Signature:______________________________Date:__________________

## 2018-05-14 ENCOUNTER — NON-PROVIDER VISIT (OUTPATIENT)
Dept: WOUND CARE | Facility: MEDICAL CENTER | Age: 63
End: 2018-05-14
Attending: HOSPITALIST
Payer: MEDICARE

## 2018-05-14 PROCEDURE — 97602 WOUND(S) CARE NON-SELECTIVE: CPT

## 2018-05-16 ENCOUNTER — NON-PROVIDER VISIT (OUTPATIENT)
Dept: WOUND CARE | Facility: MEDICAL CENTER | Age: 63
End: 2018-05-16
Attending: HOSPITALIST
Payer: MEDICARE

## 2018-05-16 PROCEDURE — 97602 WOUND(S) CARE NON-SELECTIVE: CPT

## 2018-05-16 NOTE — WOUND TEAM
"Advanced Wound Care  [facility]  [address]  [address]  [place] [address]  [phone] Fax: [phone]      Encounter Note  For Certification Period: 04/06/2018 - 06/26/2018  Start of Care: 04/06/2018      Referring Physician: Bettie Hanson M.D.  Primary Physician:  Marylin Barry M.D.      Consulting Physicians:         Wound(s): Right scrotum  Pharmacy of Choice:        Subjective:        HPI: Pt is a 63 year old male who presents with a wound vac in place to R scrotum. Patient states that he has had a hydrocele in his scrotum \"for years\" and had been told that this condition usually resolves on its own. Pt recently also developed an inguinal hernia and decided to have hernia repair as well as hydrocele removal surgery at the same time on Feb 26, 2018. Patient had post op follow up with Urologist about a month after surgery. Urologist noted increased swelling to R scrotum, and decided to drain area and sent patient home with antibiotics. A few days later patient noted increased erythema and pain to area and presented to Urologist for check up. At this appointment it was felt that s/s infection were worsening and patient needed to be admitted for IV antibiotics. While admitted, patient had I&D of R scrotum with wound vac placement by Dr. Hi Mac. Referred to [facility] for management.     Pain: Patient denies pain at wound site today.     Current Medications:  Current Outpatient Prescriptions:   •  losartan (COZAAR) 100 MG Tab, Take 1 Tab by mouth every day., Disp: 90 Tab, Rfl: 0    Allergies: No Known Allergies      Objective:      Tests and Measures: None today    Orthotic, protective, supportive devices: none    Fall Risk Assessment (oumou all that apply with an X):  Completed at initial eval on 04/06/2018        Wound Characteristics                                                    Location:   Right scrotum   Initial Evaluation  Date: 04/06/2018   Encounter Date:  5/16/2018   Tissue " Type and %: 60% moist dark red, 40% white (testicle?) 100% pink moist tissue   Periwound: Intact Intact   Drainage: Heavy sanguinous - Cannister changed 04/06/2018 Scant serosanguinous   Exposed structures Testicle? None   Wound Edges:   Open Open   Odor: None None   S&S of Infection:   None, patient on abx None   Edema: Local None   Sensation: Intact Intact               Measurements: Right scrotum Initial Evaluation  Date: 04/06/2018   Encounter Date:  5/16/2018   Length (cm) 4.5 0.1   Width (cm) 3.0 0.1   Depth (cm) 2.3 <0.1   Area (cm2) 13.5 cm2 0.01 cm2   Tract/undermine Tracts:              1:00 - 2.0cm               5:00 - 3.1cm                7:00 - 1.6cm None       VAC discontinued 05/09/2018     Procedures:                 Debridement: Nonselective with NS, gauze, and cotton tipped applicator.   Cleansed with: NS                                                                  Periwound protected with: Skin prep   Primary dressing: Aquacel Ag   Secondary Dressing: Non-adhesive foam, tegaderm.   Other:      Patient Education: Wound progress and plan of care reviewed. Wound is nearly resolved. Patient advised to keep dressing clean/dry/intact, but to replace the dressing with a bandage if it becomes soaked or falls off. Patient verbalized understanding of instructions.    Professional Collaboration: None today.      Assessment:      Wound etiology: Surgical     Wound Progress: Wound measures smaller.     Rationale for Treatment: Aquacel Ag to manage bioburden, absorb exudate, and maintain moist wound environment without laterally wicking exudate therefore reducing bri-wound maceration. Non-adhesive foam to absorb drainage, tegaderm for water resistance.     Patient tolerance/compliance: Patient tolerated treatment well.    Complicating factors: Wound location.    Need for ongoing Advanced Wound Care services:Patient requires skilled therapeutic wound care services for product selection, application of  product, debridement, close monitoring with clinical assessment to expedite of wound healing.       Plan:      Treatment Plan and Recommendations:  Diagnosis/ICD10:   L03.314 (ICD-10-CM) - Cellulitis of groin   N49.2 (ICD-10-CM) - Scrotal infection       Procedures/CPT: Non selective debridement - 94098    Frequency: 2x/week - 30 minutes.      Treatment Goals: STG 2 Weeks  LTG 4 Weeks   Granulation Tissue: Resolved Resolved   Decrease Necrotic Tissue to:     Wound Phase:  Maturation Maturation   Decrease Size by:     Periwound:      Decrease tracts/undermining by:     Decrease Pain:          At the time of each visit a thorough assessment of the patient is completed to assure the  appropriateness of our plan of care.  The dressings or modalities may need to be adapted   from the original plan to address any significant changes in the wound environment.

## 2018-05-18 ENCOUNTER — APPOINTMENT (OUTPATIENT)
Dept: WOUND CARE | Facility: MEDICAL CENTER | Age: 63
End: 2018-05-18
Attending: HOSPITALIST
Payer: MEDICARE

## 2018-05-21 ENCOUNTER — APPOINTMENT (OUTPATIENT)
Dept: WOUND CARE | Facility: MEDICAL CENTER | Age: 63
End: 2018-05-21
Attending: HOSPITALIST
Payer: MEDICARE

## 2018-05-22 ENCOUNTER — NON-PROVIDER VISIT (OUTPATIENT)
Dept: WOUND CARE | Facility: MEDICAL CENTER | Age: 63
End: 2018-05-22
Attending: HOSPITALIST
Payer: MEDICARE

## 2018-05-22 PROCEDURE — 97602 WOUND(S) CARE NON-SELECTIVE: CPT

## 2018-05-22 NOTE — WOUND TEAM
"Advanced Wound Care  [facility]  [address]  [address]  [place] [address]  [phone] Fax: [phone]      Encounter Note  For Certification Period: 04/06/2018 - 06/26/2018  Start of Care: 04/06/2018      Referring Physician: Bettie Hanson M.D.  Primary Physician:  Marylin Barry M.D.      Consulting Physicians:         Wound(s): Right scrotum  Pharmacy of Choice:        Subjective:        HPI: Pt is a 63 year old male who presents with a wound vac in place to R scrotum. Patient states that he has had a hydrocele in his scrotum \"for years\" and had been told that this condition usually resolves on its own. Pt recently also developed an inguinal hernia and decided to have hernia repair as well as hydrocele removal surgery at the same time on Feb 26, 2018. Patient had post op follow up with Urologist about a month after surgery. Urologist noted increased swelling to R scrotum, and decided to drain area and sent patient home with antibiotics. A few days later patient noted increased erythema and pain to area and presented to Urologist for check up. At this appointment it was felt that s/s infection were worsening and patient needed to be admitted for IV antibiotics. While admitted, patient had I&D of R scrotum with wound vac placement by Dr. Hi Mac. Referred to [facility] for management.     Pain: Patient denies pain at wound site today.     Current Medications:  Current Outpatient Prescriptions:   •  losartan (COZAAR) 100 MG Tab, Take 1 Tab by mouth every day., Disp: 90 Tab, Rfl: 0    Allergies: No Known Allergies      Objective:      Tests and Measures: None today    Orthotic, protective, supportive devices: none    Fall Risk Assessment (oumou all that apply with an X):  Completed at initial eval on 04/06/2018        Wound Characteristics                                                    Location:   Right scrotum   Initial Evaluation  Date: 04/06/2018   Encounter Date:  5/22/2018   Tissue " Type and %: 60% moist dark red, 40% white (testicle?) 100% epithelium   Periwound: Intact Intact   Drainage: Heavy sanguinous - Cannister changed 04/06/2018 None   Exposed structures Testicle? None   Wound Edges:   Open Closed   Odor: None None   S&S of Infection:   None, patient on abx None   Edema: Local None   Sensation: Intact Intact               Measurements: Right scrotum Initial Evaluation  Date: 04/06/2018   Encounter Date:  5/22/2018   Length (cm) 4.5 Resolved   Width (cm) 3.0    Depth (cm) 2.3    Area (cm2) 13.5 cm2    Tract/undermine Tracts:              1:00 - 2.0cm               5:00 - 3.1cm                7:00 - 1.6cm         Procedures:                 Debridement: Nonselective with NS, gauze to remove dried skin flakes   Cleansed with: NS                                                                  Periwound protected with:    Primary dressing: MICAELA   Secondary Dressing:    Other:      Patient Education: Wound resolved. Educated pt new epithelium still fragile and need gentle care. Discussed skin check in two weeks. Pt agreeable and verbalized understanding to all.    Professional Collaboration: None today.      Assessment:      Wound etiology: Surgical     Wound Progress: Wound resolved    Rationale for Treatment: Aquacel Ag to manage bioburden, absorb exudate, and maintain moist wound environment without laterally wicking exudate therefore reducing bri-wound maceration. Non-adhesive foam to absorb drainage, tegaderm for water resistance.     Patient tolerance/compliance: Patient tolerated treatment well.    Complicating factors: Wound location.    Need for ongoing Advanced Wound Care services:Patient requires skilled therapeutic wound care services for product selection, application of product, debridement, close monitoring with clinical assessment to expedite of wound healing.       Plan:      Treatment Plan and Recommendations:  Diagnosis/ICD10:   L03.314 (ICD-10-CM) - Cellulitis of groin    N49.2 (ICD-10-CM) - Scrotal infection       Procedures/CPT: Non selective debridement - 96591    Frequency: skin check in two weeks      Treatment Goals: STG 2 Weeks  LTG 4 Weeks   Granulation Tissue: Resolved Resolved   Decrease Necrotic Tissue to:     Wound Phase:  Maturation Maturation   Decrease Size by:     Periwound:      Decrease tracts/undermining by:     Decrease Pain:          At the time of each visit a thorough assessment of the patient is completed to assure the  appropriateness of our plan of care.  The dressings or modalities may need to be adapted   from the original plan to address any significant changes in the wound environment.

## 2018-05-23 ENCOUNTER — APPOINTMENT (OUTPATIENT)
Dept: WOUND CARE | Facility: MEDICAL CENTER | Age: 63
End: 2018-05-23
Attending: HOSPITALIST
Payer: MEDICARE

## 2018-05-25 ENCOUNTER — APPOINTMENT (OUTPATIENT)
Dept: WOUND CARE | Facility: MEDICAL CENTER | Age: 63
End: 2018-05-25
Attending: HOSPITALIST
Payer: MEDICARE

## 2018-05-28 ENCOUNTER — APPOINTMENT (OUTPATIENT)
Dept: WOUND CARE | Facility: MEDICAL CENTER | Age: 63
End: 2018-05-28
Attending: HOSPITALIST
Payer: MEDICARE

## 2018-05-30 ENCOUNTER — APPOINTMENT (OUTPATIENT)
Dept: WOUND CARE | Facility: MEDICAL CENTER | Age: 63
End: 2018-05-30
Attending: HOSPITALIST
Payer: MEDICARE

## 2018-06-05 ENCOUNTER — NON-PROVIDER VISIT (OUTPATIENT)
Dept: WOUND CARE | Facility: MEDICAL CENTER | Age: 63
End: 2018-06-05
Attending: HOSPITALIST
Payer: MEDICARE

## 2018-06-05 PROCEDURE — 99211 OFF/OP EST MAY X REQ PHY/QHP: CPT

## 2018-07-06 DIAGNOSIS — I10 ESSENTIAL (PRIMARY) HYPERTENSION: ICD-10-CM

## 2018-07-06 NOTE — TELEPHONE ENCOUNTER
Was the patient seen in the last year in this department? No- last visit 6/2/17    Does patient have an active prescription for medications requested? No     Received Request Via: Pharmacy

## 2018-07-08 RX ORDER — LOSARTAN POTASSIUM 100 MG/1
100 TABLET ORAL DAILY
Qty: 90 TAB | Refills: 0 | Status: SHIPPED | OUTPATIENT
Start: 2018-07-08 | End: 2018-07-31 | Stop reason: SDUPTHER

## 2018-07-31 ENCOUNTER — OFFICE VISIT (OUTPATIENT)
Dept: MEDICAL GROUP | Facility: MEDICAL CENTER | Age: 63
End: 2018-07-31
Payer: MEDICARE

## 2018-07-31 DIAGNOSIS — I10 ESSENTIAL (PRIMARY) HYPERTENSION: ICD-10-CM

## 2018-07-31 DIAGNOSIS — S46.212A RUPTURE OF LEFT DISTAL BICEPS TENDON, INITIAL ENCOUNTER: ICD-10-CM

## 2018-07-31 DIAGNOSIS — E78.2 MULTIPLE-TYPE HYPERLIPIDEMIA: ICD-10-CM

## 2018-07-31 PROCEDURE — 99214 OFFICE O/P EST MOD 30 MIN: CPT | Performed by: FAMILY MEDICINE

## 2018-07-31 RX ORDER — LOSARTAN POTASSIUM 100 MG/1
100 TABLET ORAL DAILY
Qty: 90 TAB | Refills: 3 | Status: SHIPPED | OUTPATIENT
Start: 2018-07-31 | End: 2019-09-11 | Stop reason: SDUPTHER

## 2018-07-31 NOTE — PATIENT INSTRUCTIONS
Biceps Tendon Disruption (Distal)  The distal biceps tendon is a strong cord of tissue that connects the biceps muscle, on the front of the upper arm, to a bone (radius) in the elbow. A distal biceps tendon disruption can interfere with the ability to turn the hand palm-up (supination) and bend (flex) the elbow. This is an uncommon injury that may include one or both of the following:  · A complete tear (rupture) in the tendon, causing the tendon to completely separate from the radius. When this happens, the biceps muscle pulls up (retracts) toward the shoulder.  · A partial tear in the tendon that causes the tendon to partially separate from the radius. This is less common than a tendon rupture.  This condition is commonly treated with surgery, and recovery may take 4-8 months. Your health care provider will decide when it is safe for you to return to contact sports.  What are the causes?  This condition is usually caused by suddenly straightening the elbow while the biceps muscle is tightened (contracted). This could happen, for example, while lifting or carrying a heavy object that suddenly falls or shifts position.  What increases the risk?  The following factors may increase your risk of developing this condition:  · Being a man who is 30-50 years old.  · Smoking.  · Using steroids.  What are the signs or symptoms?  Symptoms of this condition may include:  · Feeling a “pop” in the elbow at the time of injury.  · Pain, swelling, and bruising in the front of the elbow.  · Weakness in the arm when doing certain movements, such as:  ¨ Lifting or carrying objects.  ¨ Rotating the forearm, such as when you turn a screwdriver.  ¨ Bending the elbow.  · A bulge in the upper arm. You may feel this in the front of the arm, the inside of the arm, or both.  · Limited range of motion of the elbow and forearm.  How is this diagnosed?  This condition is diagnosed based on your symptoms, your medical history, and a physical  exam. Your health care provider may test your range of motion by having you do arm movements. You may have tests, including:  · X-rays.  · Ultrasound. This uses sound waves to make an image of the affected area.  · MRI.  How is this treated?  Treatment for this condition usually includes one or more of the following:  · Resting from sports and intense physical activity.  · Physical therapy.  · Surgery to reattach your tendon to your radius. This is the most common treatment method. Your elbow will be kept in place (immobilized) with a cast immediately after surgery.  · A splint or brace to immobilize your elbow. If you had surgery and you received a cast after surgery, you will get a splint or brace to replace your cast when you start physical therapy.  In some cases, no treatment may be needed for this condition.  Follow these instructions at home:  If you have a splint or brace:  · Wear the splint or brace as told by your health care provider. Remove it only as told by your health care provider.  · Loosen the splint or brace if your fingers tingle, become numb, or turn cold and blue.  · Do not let your splint or brace get wet if it is not waterproof.  ¨ If you have a splint or brace, do not take baths, swim, or use a hot tub until your health care provider approves. Ask your health care provider if you can take showers. You may only be allowed to take sponge baths for bathing.  ¨ If your splint or brace is not waterproof, cover it with a watertight covering when you take a bath or a shower.  · Keep the splint or brace clean.  If you have a cast:  · Do not stick anything inside the cast to scratch your skin. Doing that increases your risk of infection.  · Check the skin around the cast every day. Tell your health care provider about any concerns.  · You may put lotion on dry skin around the edges of the cast. Do not put lotion on the skin underneath the cast.  · Keep the cast clean.  · If the cast is not  waterproof:  ¨ Do not let it get wet.  ¨ Cover it with a watertight covering when you take a bath or a shower.  Managing pain, stiffness, and swelling  · If directed, put ice on the injured area:  ¨ Put ice in a plastic bag.  ¨ Place a towel between your skin and the bag.  ¨ Leave the ice on for 20 minutes, 2-3 times a day.  · If directed, apply heat to the affected area before you exercise or as often as told by your health care provider. Use the heat source that your health care provider recommends, such as a moist heat pack or a heating pad.  ¨ Place a towel between your skin and the heat source.  ¨ Leave the heat on for 20-30 minutes.  ¨ Remove the heat if your skin turns bright red. This is especially important if you are unable to feel pain, heat, or cold. You may have a greater risk of getting burned.  · Move your fingers often to avoid stiffness and to lessen swelling.  · Raise (elevate) the injured area above the level of your heart while you are sitting or lying down.  Driving  · Do not drive or operate heavy machinery while taking prescription pain medicine.  · Ask your health care provider when it is safe to drive if you have a cast, brace, splint, or sling on your arm.  Activity  · Return to your normal activities as told by your health care provider. Ask your health care provider what activities are safe for you.  · Avoid activities that cause pain or make your condition worse.  · Do not participate in contact sports until your health care provider approves.  · Do not lift or carry anything with your injured arm until your health care provider approves.  · Do exercises as told by your health care provider.  Safety  · Do not use the affected limb to support your body weight until your health care provider says that you can.  General instructions  · Take over-the-counter and prescription medicines only as told by your health care provider.  · If you have a cast or splint, do not put pressure on any part  of the cast or splint until it is fully hardened. This may take several hours.  · Keep all follow-up visits as told by your health care provider. This is important.  Contact a health care provider if:  · Your cast, splint, or brace causes pain or numbness.  Get help right away if:  · You develop severe pain.  · You develop numbness or tingling in your hand.  · Your hand feels unusually cold.  · Your fingernails turn a dark color, such as blue or gray.  This information is not intended to replace advice given to you by your health care provider. Make sure you discuss any questions you have with your health care provider.  Document Released: 12/18/2006 Document Revised: 08/24/2017 Document Reviewed: 09/11/2016  Elsevier Interactive Patient Education © 2017 [employer]

## 2018-08-02 VITALS
SYSTOLIC BLOOD PRESSURE: 128 MMHG | HEIGHT: 64 IN | DIASTOLIC BLOOD PRESSURE: 80 MMHG | WEIGHT: 152 LBS | RESPIRATION RATE: 20 BRPM | OXYGEN SATURATION: 98 % | TEMPERATURE: 97.9 F | HEART RATE: 71 BPM | BODY MASS INDEX: 25.95 KG/M2

## 2018-08-02 NOTE — ASSESSMENT & PLAN NOTE
Stable. Currently taking losartan 100 mg as directed.   He is not taking baby aspirin daily.   He is monitoring BP at home.   Denies symptoms low BP: light-headed, tunnel-vision, unusual fatigue.   Denies symptoms high BP:pounding headache, visual changes, palpitations, flushed face.   Denies medicine side effects: unusual fatigue, slow heartbeat, foot/leg swelling, cough.

## 2018-08-02 NOTE — PROGRESS NOTES
Subjective:     Chief Complaint   Patient presents with   • Blood Pressure Problem   • Nodule     Bruise & Nodule on L Arm       Chaparro Rosenberg is a 63 y.o. male here today for evaluation and management of:    Rupture of left distal biceps tendon  Patient was mountain biking 2 weeks ago when he turned the hand does quickly and felt a pop in his left upper arm.  He developed some bruising in that area over the next week.  He has some tenderness in that area.    Multiple-type hyperlipidemia  Patient has a history of hyperlipidemia.  He had been managing it with diet and exercise but was unable to exercise for approximately 3 months following his inguinal hernia and hydrocele repair.  This area got infected and he was on a wound VAC for about 6 weeks.  He has gotten back into his exercise program and is eating a low-fat diet.    Essential (primary) hypertension  Stable. Currently taking losartan 100 mg as directed.   He is not taking baby aspirin daily.   He is monitoring BP at home.   Denies symptoms low BP: light-headed, tunnel-vision, unusual fatigue.   Denies symptoms high BP:pounding headache, visual changes, palpitations, flushed face.   Denies medicine side effects: unusual fatigue, slow heartbeat, foot/leg swelling, cough.         No Known Allergies    Current medicines (including changes today)  Current Outpatient Prescriptions   Medication Sig Dispense Refill   • losartan (COZAAR) 100 MG Tab Take 1 Tab by mouth every day. 90 Tab 3     No current facility-administered medications for this visit.        He  has a past medical history of Hyperlipidemia and Hypertension.    Patient Active Problem List    Diagnosis Date Noted   • Cellulitis 03/29/2018     Priority: High   • Rupture of left distal biceps tendon 07/31/2018   • BPH with obstruction/lower urinary tract symptoms 06/02/2017   • Spermatocele 06/02/2017   • Essential (primary) hypertension 03/23/2016   • Multiple-type hyperlipidemia 03/23/2016       ROS  "  No fever or chills.  No nausea or vomiting.  No chest pain or palpitations.  No cough or SOB.  No pain with urination or hematuria.  No black or bloody stools.       Objective:     Blood pressure 128/80, pulse 71, temperature 36.6 °C (97.9 °F), resp. rate 20, height 1.626 m (5' 4\"), weight 68.9 kg (152 lb), SpO2 98 %. Body mass index is 26.09 kg/m².   Physical Exam:  Well developed, well nourished.  Alert, oriented in no acute distress.  Eye contact is good, speech goal directed, affect calm  Eyes: conjunctiva non-injected, sclera non-icteric.  Neck Supple.  No adenopathy or masses in the neck or supraclavicular regions. No thyromegaly  Lungs: clear to auscultation bilaterally with good excursion. No wheezes or rhonchi  CV: regular rate and rhythm. No murmur  Left upper arm with rupture of the distal portion of the biceps tendon with bunching of the biceps in the upper arm on flexion.  Ecchymosis is present          Assessment and Plan:   The following treatment plan was discussed    1. Essential (primary) hypertension  Continue losartan 100 mg daily  - losartan (COZAAR) 100 MG Tab; Take 1 Tab by mouth every day.  Dispense: 90 Tab; Refill: 3    2. Multiple-type hyperlipidemia  Continue low-fat diet and increase exercise.  Recheck in 1 year    3. Rupture of left distal biceps tendon, initial encounter  Refer for evaluation to orthopedics urgently.  Discussed the need for surgical repair given his active lifestyle.  - REFERRAL TO ORTHOPEDICS    Any change or worsening of signs or symptoms, patient encouraged to follow-up or report to the emergency room for further evaluation. Patient understands and agrees.    Followup: Return in about 1 year (around 7/31/2019).           "

## 2018-08-02 NOTE — ASSESSMENT & PLAN NOTE
Patient has a history of hyperlipidemia.  He had been managing it with diet and exercise but was unable to exercise for approximately 3 months following his inguinal hernia and hydrocele repair.  This area got infected and he was on a wound VAC for about 6 weeks.  He has gotten back into his exercise program and is eating a low-fat diet.

## 2018-08-02 NOTE — ASSESSMENT & PLAN NOTE
Patient was mountain biking 2 weeks ago when he turned the hand does quickly and felt a pop in his left upper arm.  He developed some bruising in that area over the next week.  He has some tenderness in that area.

## 2019-05-08 ENCOUNTER — OFFICE VISIT (OUTPATIENT)
Dept: MEDICAL GROUP | Facility: LAB | Age: 64
End: 2019-05-08
Payer: MEDICARE

## 2019-05-08 VITALS
WEIGHT: 151.68 LBS | SYSTOLIC BLOOD PRESSURE: 124 MMHG | HEART RATE: 78 BPM | TEMPERATURE: 98.7 F | DIASTOLIC BLOOD PRESSURE: 80 MMHG | OXYGEN SATURATION: 99 % | BODY MASS INDEX: 25.89 KG/M2 | HEIGHT: 64 IN

## 2019-05-08 DIAGNOSIS — E78.2 MULTIPLE-TYPE HYPERLIPIDEMIA: ICD-10-CM

## 2019-05-08 DIAGNOSIS — Z13.6 SCREENING FOR AAA (ABDOMINAL AORTIC ANEURYSM): ICD-10-CM

## 2019-05-08 DIAGNOSIS — I10 ESSENTIAL (PRIMARY) HYPERTENSION: ICD-10-CM

## 2019-05-08 DIAGNOSIS — Z87.891 HISTORY OF TOBACCO ABUSE: ICD-10-CM

## 2019-05-08 DIAGNOSIS — Z82.3 FAMILY HISTORY OF STROKE: ICD-10-CM

## 2019-05-08 DIAGNOSIS — R55 SYNCOPE, UNSPECIFIED SYNCOPE TYPE: ICD-10-CM

## 2019-05-08 PROCEDURE — 99214 OFFICE O/P EST MOD 30 MIN: CPT | Performed by: FAMILY MEDICINE

## 2019-05-08 ASSESSMENT — PATIENT HEALTH QUESTIONNAIRE - PHQ9: CLINICAL INTERPRETATION OF PHQ2 SCORE: 0

## 2019-05-20 NOTE — ASSESSMENT & PLAN NOTE
Stable. Currently taking losartan 100 mg as directed.   He is not taking baby aspirin daily.   He is not monitoring BP at home.   Denies symptoms low BP: light-headed, tunnel-vision, unusual fatigue, except for the one episode of syncope  Denies symptoms high BP:pounding headache, visual changes, palpitations, flushed face.   Denies medicine side effects: unusual fatigue, slow heartbeat, foot/leg swelling, cough.

## 2019-05-20 NOTE — ASSESSMENT & PLAN NOTE
Patient had a syncopal episode on 4/20/19.  He had had 2 alcoholic drinks and went into the kitchen to make dinner.  He reports he had no warning fell to the floor.  He was seen at the [facility] emergency room and CT scan and EKG were normal.  He had been exercising more regularly and his blood pressure was a little low when he was seen.  This is the first time he has had a syncopal episode.  He denies any headaches.

## 2019-05-20 NOTE — ASSESSMENT & PLAN NOTE
Patient has been watching his diet and is exercising more.  He would like to stay off a statin if possible.

## 2019-06-06 ENCOUNTER — HOSPITAL ENCOUNTER (OUTPATIENT)
Dept: RADIOLOGY | Facility: MEDICAL CENTER | Age: 64
End: 2019-06-06
Attending: FAMILY MEDICINE
Payer: MEDICARE

## 2019-06-06 DIAGNOSIS — Z87.891 HISTORY OF TOBACCO ABUSE: ICD-10-CM

## 2019-06-06 DIAGNOSIS — Z13.6 SCREENING FOR AAA (ABDOMINAL AORTIC ANEURYSM): ICD-10-CM

## 2019-06-06 DIAGNOSIS — R55 SYNCOPE, UNSPECIFIED SYNCOPE TYPE: ICD-10-CM

## 2019-06-06 PROCEDURE — 76775 US EXAM ABDO BACK WALL LIM: CPT

## 2019-06-06 PROCEDURE — 93880 EXTRACRANIAL BILAT STUDY: CPT | Mod: 26 | Performed by: SURGERY

## 2019-06-06 PROCEDURE — 93880 EXTRACRANIAL BILAT STUDY: CPT

## 2019-07-10 ENCOUNTER — HOSPITAL ENCOUNTER (OUTPATIENT)
Dept: RADIOLOGY | Facility: MEDICAL CENTER | Age: 64
End: 2019-07-10
Attending: UROLOGY | Admitting: UROLOGY
Payer: MEDICARE

## 2019-07-10 DIAGNOSIS — Z01.812 PRE-OPERATIVE LABORATORY EXAMINATION: ICD-10-CM

## 2019-07-10 DIAGNOSIS — Z01.811 PRE-OPERATIVE RESPIRATORY EXAMINATION: ICD-10-CM

## 2019-07-10 DIAGNOSIS — Z01.810 PRE-OPERATIVE CARDIOVASCULAR EXAMINATION: ICD-10-CM

## 2019-07-10 LAB
ALBUMIN SERPL BCP-MCNC: 4.4 G/DL (ref 3.2–4.9)
ALBUMIN/GLOB SERPL: 1.6 G/DL
ALP SERPL-CCNC: 64 U/L (ref 30–99)
ALT SERPL-CCNC: 19 U/L (ref 2–50)
ANION GAP SERPL CALC-SCNC: 9 MMOL/L (ref 0–11.9)
APPEARANCE UR: CLEAR
AST SERPL-CCNC: 20 U/L (ref 12–45)
BASOPHILS # BLD AUTO: 0.5 % (ref 0–1.8)
BASOPHILS # BLD: 0.05 K/UL (ref 0–0.12)
BILIRUB SERPL-MCNC: 0.4 MG/DL (ref 0.1–1.5)
BILIRUB UR QL STRIP.AUTO: NEGATIVE
BUN SERPL-MCNC: 23 MG/DL (ref 8–22)
CALCIUM SERPL-MCNC: 9.8 MG/DL (ref 8.5–10.5)
CHLORIDE SERPL-SCNC: 101 MMOL/L (ref 96–112)
CO2 SERPL-SCNC: 24 MMOL/L (ref 20–33)
COLOR UR: YELLOW
CREAT SERPL-MCNC: 1.05 MG/DL (ref 0.5–1.4)
EKG IMPRESSION: NORMAL
EOSINOPHIL # BLD AUTO: 0.02 K/UL (ref 0–0.51)
EOSINOPHIL NFR BLD: 0.2 % (ref 0–6.9)
ERYTHROCYTE [DISTWIDTH] IN BLOOD BY AUTOMATED COUNT: 45 FL (ref 35.9–50)
GLOBULIN SER CALC-MCNC: 2.7 G/DL (ref 1.9–3.5)
GLUCOSE SERPL-MCNC: 105 MG/DL (ref 65–99)
GLUCOSE UR STRIP.AUTO-MCNC: NEGATIVE MG/DL
HCT VFR BLD AUTO: 47.7 % (ref 42–52)
HGB BLD-MCNC: 15.7 G/DL (ref 14–18)
IMM GRANULOCYTES # BLD AUTO: 0.03 K/UL (ref 0–0.11)
IMM GRANULOCYTES NFR BLD AUTO: 0.3 % (ref 0–0.9)
KETONES UR STRIP.AUTO-MCNC: NEGATIVE MG/DL
LEUKOCYTE ESTERASE UR QL STRIP.AUTO: NEGATIVE
LYMPHOCYTES # BLD AUTO: 4.26 K/UL (ref 1–4.8)
LYMPHOCYTES NFR BLD: 40.6 % (ref 22–41)
MCH RBC QN AUTO: 30.7 PG (ref 27–33)
MCHC RBC AUTO-ENTMCNC: 32.9 G/DL (ref 33.7–35.3)
MCV RBC AUTO: 93.2 FL (ref 81.4–97.8)
MICRO URNS: NORMAL
MONOCYTES # BLD AUTO: 0.73 K/UL (ref 0–0.85)
MONOCYTES NFR BLD AUTO: 7 % (ref 0–13.4)
NEUTROPHILS # BLD AUTO: 5.4 K/UL (ref 1.82–7.42)
NEUTROPHILS NFR BLD: 51.4 % (ref 44–72)
NITRITE UR QL STRIP.AUTO: NEGATIVE
NRBC # BLD AUTO: 0 K/UL
NRBC BLD-RTO: 0 /100 WBC
PH UR STRIP.AUTO: 7.5 [PH]
PLATELET # BLD AUTO: 225 K/UL (ref 164–446)
PMV BLD AUTO: 11.1 FL (ref 9–12.9)
POTASSIUM SERPL-SCNC: 4 MMOL/L (ref 3.6–5.5)
PROT SERPL-MCNC: 7.1 G/DL (ref 6–8.2)
PROT UR QL STRIP: NEGATIVE MG/DL
RBC # BLD AUTO: 5.12 M/UL (ref 4.7–6.1)
RBC UR QL AUTO: NEGATIVE
SODIUM SERPL-SCNC: 134 MMOL/L (ref 135–145)
SP GR UR STRIP.AUTO: 1.01
UROBILINOGEN UR STRIP.AUTO-MCNC: 0.2 MG/DL
WBC # BLD AUTO: 10.5 K/UL (ref 4.8–10.8)

## 2019-07-10 PROCEDURE — 36415 COLL VENOUS BLD VENIPUNCTURE: CPT

## 2019-07-10 PROCEDURE — 87086 URINE CULTURE/COLONY COUNT: CPT

## 2019-07-10 PROCEDURE — 85025 COMPLETE CBC W/AUTO DIFF WBC: CPT

## 2019-07-10 PROCEDURE — 93010 ELECTROCARDIOGRAM REPORT: CPT | Performed by: INTERNAL MEDICINE

## 2019-07-10 PROCEDURE — 80053 COMPREHEN METABOLIC PANEL: CPT

## 2019-07-10 PROCEDURE — 93005 ELECTROCARDIOGRAM TRACING: CPT

## 2019-07-10 PROCEDURE — 71045 X-RAY EXAM CHEST 1 VIEW: CPT

## 2019-07-10 PROCEDURE — 81003 URINALYSIS AUTO W/O SCOPE: CPT

## 2019-07-12 LAB
BACTERIA UR CULT: NORMAL
SIGNIFICANT IND 70042: NORMAL
SITE SITE: NORMAL
SOURCE SOURCE: NORMAL

## 2019-07-24 ENCOUNTER — ANESTHESIA (OUTPATIENT)
Dept: SURGERY | Facility: MEDICAL CENTER | Age: 64
End: 2019-07-24
Payer: MEDICARE

## 2019-07-24 ENCOUNTER — HOSPITAL ENCOUNTER (OUTPATIENT)
Facility: MEDICAL CENTER | Age: 64
End: 2019-07-24
Attending: UROLOGY | Admitting: UROLOGY
Payer: MEDICARE

## 2019-07-24 ENCOUNTER — ANESTHESIA EVENT (OUTPATIENT)
Dept: SURGERY | Facility: MEDICAL CENTER | Age: 64
End: 2019-07-24
Payer: MEDICARE

## 2019-07-24 VITALS
WEIGHT: 151.68 LBS | BODY MASS INDEX: 25.89 KG/M2 | OXYGEN SATURATION: 98 % | RESPIRATION RATE: 18 BRPM | TEMPERATURE: 96.4 F | HEART RATE: 53 BPM | HEIGHT: 64 IN

## 2019-07-24 LAB — PATHOLOGY CONSULT NOTE: NORMAL

## 2019-07-24 PROCEDURE — 160046 HCHG PACU - 1ST 60 MINS PHASE II: Performed by: UROLOGY

## 2019-07-24 PROCEDURE — 700102 HCHG RX REV CODE 250 W/ 637 OVERRIDE(OP): Performed by: ANESTHESIOLOGY

## 2019-07-24 PROCEDURE — 700105 HCHG RX REV CODE 258: Performed by: UROLOGY

## 2019-07-24 PROCEDURE — 88302 TISSUE EXAM BY PATHOLOGIST: CPT

## 2019-07-24 PROCEDURE — 160009 HCHG ANES TIME/MIN: Performed by: UROLOGY

## 2019-07-24 PROCEDURE — 160036 HCHG PACU - EA ADDL 30 MINS PHASE I: Performed by: UROLOGY

## 2019-07-24 PROCEDURE — 160025 RECOVERY II MINUTES (STATS): Performed by: UROLOGY

## 2019-07-24 PROCEDURE — 700101 HCHG RX REV CODE 250: Performed by: ANESTHESIOLOGY

## 2019-07-24 PROCEDURE — 700111 HCHG RX REV CODE 636 W/ 250 OVERRIDE (IP): Performed by: ANESTHESIOLOGY

## 2019-07-24 PROCEDURE — 501838 HCHG SUTURE GENERAL: Performed by: UROLOGY

## 2019-07-24 PROCEDURE — 700111 HCHG RX REV CODE 636 W/ 250 OVERRIDE (IP): Performed by: UROLOGY

## 2019-07-24 PROCEDURE — 160035 HCHG PACU - 1ST 60 MINS PHASE I: Performed by: UROLOGY

## 2019-07-24 PROCEDURE — 160039 HCHG SURGERY MINUTES - EA ADDL 1 MIN LEVEL 3: Performed by: UROLOGY

## 2019-07-24 PROCEDURE — 160002 HCHG RECOVERY MINUTES (STAT): Performed by: UROLOGY

## 2019-07-24 PROCEDURE — A6403 STERILE GAUZE>16 <= 48 SQ IN: HCPCS | Performed by: UROLOGY

## 2019-07-24 PROCEDURE — 500383 HCHG DRAIN, PENROSE 3/8X12: Performed by: UROLOGY

## 2019-07-24 PROCEDURE — 160028 HCHG SURGERY MINUTES - 1ST 30 MINS LEVEL 3: Performed by: UROLOGY

## 2019-07-24 PROCEDURE — 160048 HCHG OR STATISTICAL LEVEL 1-5: Performed by: UROLOGY

## 2019-07-24 PROCEDURE — A9270 NON-COVERED ITEM OR SERVICE: HCPCS | Performed by: ANESTHESIOLOGY

## 2019-07-24 RX ORDER — ONDANSETRON 2 MG/ML
INJECTION INTRAMUSCULAR; INTRAVENOUS PRN
Status: DISCONTINUED | OUTPATIENT
Start: 2019-07-24 | End: 2019-07-24 | Stop reason: SURG

## 2019-07-24 RX ORDER — DEXAMETHASONE SODIUM PHOSPHATE 4 MG/ML
INJECTION, SOLUTION INTRA-ARTICULAR; INTRALESIONAL; INTRAMUSCULAR; INTRAVENOUS; SOFT TISSUE PRN
Status: DISCONTINUED | OUTPATIENT
Start: 2019-07-24 | End: 2019-07-24 | Stop reason: SURG

## 2019-07-24 RX ORDER — SODIUM CHLORIDE, SODIUM LACTATE, POTASSIUM CHLORIDE, CALCIUM CHLORIDE 600; 310; 30; 20 MG/100ML; MG/100ML; MG/100ML; MG/100ML
INJECTION, SOLUTION INTRAVENOUS CONTINUOUS
Status: DISCONTINUED | OUTPATIENT
Start: 2019-07-24 | End: 2019-07-24 | Stop reason: HOSPADM

## 2019-07-24 RX ORDER — CELECOXIB 200 MG/1
400 CAPSULE ORAL ONCE
Status: COMPLETED | OUTPATIENT
Start: 2019-07-24 | End: 2019-07-24

## 2019-07-24 RX ORDER — HYDROMORPHONE HYDROCHLORIDE 1 MG/ML
0.4 INJECTION, SOLUTION INTRAMUSCULAR; INTRAVENOUS; SUBCUTANEOUS
Status: DISCONTINUED | OUTPATIENT
Start: 2019-07-24 | End: 2019-07-24 | Stop reason: HOSPADM

## 2019-07-24 RX ORDER — HYDROMORPHONE HYDROCHLORIDE 1 MG/ML
0.2 INJECTION, SOLUTION INTRAMUSCULAR; INTRAVENOUS; SUBCUTANEOUS
Status: DISCONTINUED | OUTPATIENT
Start: 2019-07-24 | End: 2019-07-24 | Stop reason: HOSPADM

## 2019-07-24 RX ORDER — LABETALOL HYDROCHLORIDE 5 MG/ML
5 INJECTION, SOLUTION INTRAVENOUS
Status: DISCONTINUED | OUTPATIENT
Start: 2019-07-24 | End: 2019-07-24 | Stop reason: HOSPADM

## 2019-07-24 RX ORDER — HYDRALAZINE HYDROCHLORIDE 20 MG/ML
5 INJECTION INTRAMUSCULAR; INTRAVENOUS
Status: DISCONTINUED | OUTPATIENT
Start: 2019-07-24 | End: 2019-07-24 | Stop reason: HOSPADM

## 2019-07-24 RX ORDER — OXYCODONE HCL 5 MG/5 ML
5 SOLUTION, ORAL ORAL
Status: DISCONTINUED | OUTPATIENT
Start: 2019-07-24 | End: 2019-07-24 | Stop reason: HOSPADM

## 2019-07-24 RX ORDER — LORAZEPAM 2 MG/ML
0.5 INJECTION INTRAMUSCULAR
Status: DISCONTINUED | OUTPATIENT
Start: 2019-07-24 | End: 2019-07-24 | Stop reason: HOSPADM

## 2019-07-24 RX ORDER — DIPHENHYDRAMINE HYDROCHLORIDE 50 MG/ML
12.5 INJECTION INTRAMUSCULAR; INTRAVENOUS
Status: DISCONTINUED | OUTPATIENT
Start: 2019-07-24 | End: 2019-07-24 | Stop reason: HOSPADM

## 2019-07-24 RX ORDER — BUPIVACAINE HYDROCHLORIDE 2.5 MG/ML
INJECTION, SOLUTION EPIDURAL; INFILTRATION; INTRACAUDAL
Status: DISCONTINUED | OUTPATIENT
Start: 2019-07-24 | End: 2019-07-24 | Stop reason: HOSPADM

## 2019-07-24 RX ORDER — HYDROMORPHONE HYDROCHLORIDE 1 MG/ML
0.1 INJECTION, SOLUTION INTRAMUSCULAR; INTRAVENOUS; SUBCUTANEOUS
Status: DISCONTINUED | OUTPATIENT
Start: 2019-07-24 | End: 2019-07-24 | Stop reason: HOSPADM

## 2019-07-24 RX ORDER — OXYCODONE HCL 5 MG/5 ML
10 SOLUTION, ORAL ORAL
Status: DISCONTINUED | OUTPATIENT
Start: 2019-07-24 | End: 2019-07-24 | Stop reason: HOSPADM

## 2019-07-24 RX ORDER — COLLODION, FLEXIBLE
LIQUID (ML) MISCELLANEOUS
Status: DISCONTINUED | OUTPATIENT
Start: 2019-07-24 | End: 2019-07-24 | Stop reason: HOSPADM

## 2019-07-24 RX ORDER — MEPERIDINE HYDROCHLORIDE 25 MG/ML
25 INJECTION INTRAMUSCULAR; INTRAVENOUS; SUBCUTANEOUS
Status: DISCONTINUED | OUTPATIENT
Start: 2019-07-24 | End: 2019-07-24 | Stop reason: HOSPADM

## 2019-07-24 RX ORDER — CEFAZOLIN SODIUM 1 G/3ML
INJECTION, POWDER, FOR SOLUTION INTRAMUSCULAR; INTRAVENOUS PRN
Status: DISCONTINUED | OUTPATIENT
Start: 2019-07-24 | End: 2019-07-24 | Stop reason: SURG

## 2019-07-24 RX ORDER — ACETAMINOPHEN 500 MG
1000 TABLET ORAL ONCE
Status: COMPLETED | OUTPATIENT
Start: 2019-07-24 | End: 2019-07-24

## 2019-07-24 RX ORDER — HALOPERIDOL 5 MG/ML
1 INJECTION INTRAMUSCULAR
Status: DISCONTINUED | OUTPATIENT
Start: 2019-07-24 | End: 2019-07-24 | Stop reason: HOSPADM

## 2019-07-24 RX ORDER — ONDANSETRON 2 MG/ML
4 INJECTION INTRAMUSCULAR; INTRAVENOUS
Status: DISCONTINUED | OUTPATIENT
Start: 2019-07-24 | End: 2019-07-24 | Stop reason: HOSPADM

## 2019-07-24 RX ADMIN — MIDAZOLAM HYDROCHLORIDE 2 MG: 1 INJECTION, SOLUTION INTRAMUSCULAR; INTRAVENOUS at 10:55

## 2019-07-24 RX ADMIN — CELECOXIB 400 MG: 200 CAPSULE ORAL at 09:51

## 2019-07-24 RX ADMIN — ROCURONIUM BROMIDE 50 MG: 10 INJECTION, SOLUTION INTRAVENOUS at 11:00

## 2019-07-24 RX ADMIN — ATROPINE SULFATE 0.4 MG: 0.4 INJECTION, SOLUTION INTRAMUSCULAR; INTRAVENOUS; SUBCUTANEOUS at 11:19

## 2019-07-24 RX ADMIN — SODIUM CHLORIDE, POTASSIUM CHLORIDE, SODIUM LACTATE AND CALCIUM CHLORIDE: 600; 310; 30; 20 INJECTION, SOLUTION INTRAVENOUS at 09:51

## 2019-07-24 RX ADMIN — ACETAMINOPHEN 1000 MG: 500 TABLET ORAL at 09:51

## 2019-07-24 RX ADMIN — FENTANYL CITRATE 100 MCG: 50 INJECTION, SOLUTION INTRAMUSCULAR; INTRAVENOUS at 11:00

## 2019-07-24 RX ADMIN — CEFAZOLIN 2 G: 330 INJECTION, POWDER, FOR SOLUTION INTRAMUSCULAR; INTRAVENOUS at 10:55

## 2019-07-24 RX ADMIN — LIDOCAINE HYDROCHLORIDE 60 MG: 20 INJECTION, SOLUTION INTRAVENOUS at 11:00

## 2019-07-24 RX ADMIN — PROPOFOL 140 MG: 10 INJECTION, EMULSION INTRAVENOUS at 11:00

## 2019-07-24 RX ADMIN — ONDANSETRON 4 MG: 2 INJECTION INTRAMUSCULAR; INTRAVENOUS at 11:12

## 2019-07-24 RX ADMIN — EPHEDRINE SULFATE 10 MG: 50 INJECTION INTRAMUSCULAR; INTRAVENOUS; SUBCUTANEOUS at 11:07

## 2019-07-24 RX ADMIN — SODIUM CHLORIDE, POTASSIUM CHLORIDE, SODIUM LACTATE AND CALCIUM CHLORIDE: 600; 310; 30; 20 INJECTION, SOLUTION INTRAVENOUS at 10:55

## 2019-07-24 RX ADMIN — DEXAMETHASONE SODIUM PHOSPHATE 8 MG: 4 INJECTION, SOLUTION INTRA-ARTICULAR; INTRALESIONAL; INTRAMUSCULAR; INTRAVENOUS; SOFT TISSUE at 11:12

## 2019-07-24 RX ADMIN — EPHEDRINE SULFATE 10 MG: 50 INJECTION INTRAMUSCULAR; INTRAVENOUS; SUBCUTANEOUS at 11:35

## 2019-07-24 RX ADMIN — SUGAMMADEX 200 MG: 100 INJECTION, SOLUTION INTRAVENOUS at 12:05

## 2019-07-24 ASSESSMENT — PAIN SCALES - GENERAL: PAIN_LEVEL: 1

## 2019-07-24 NOTE — OR SURGEON
Immediate Post OP Note    PreOp Diagnosis: Left Hydrocele    PostOp Diagnosis: As above    Procedure(s):  LEFT HYDROCELECTOMY, ADULT - Wound Class: Clean    Surgeon(s):  Hi Mac M.D.    Anesthesiologist/Type of Anesthesia:  Anesthesiologist: Mayank Landa M.D./General LMA  Surgical Staff:  Circulator: Nadja Restrepo R.N.  Relief Circulator: hTuy Brunner R.N.  Relief Scrub: Km Pruitt  Scrub Person: Dany Hernandez    Specimens removed if any:  ID Type Source Tests Collected by Time Destination   A : Left Hydrocele Sac Other Other PATHOLOGY SPECIMEN Hi Mac M.D. 7/24/2019 11:34 AM        Estimated Blood Loss: N/A    Findings: Inflammation of the tunica albiginea    Complications: None  Drains: 1/2 inch penrose to dependent portion of the left scrotum        7/24/2019 12:10 PM Hi Mac M.D.

## 2019-07-24 NOTE — ANESTHESIA QCDR
2019 [facility] Clinical Data Registry (for Quality Improvement)     Postoperative nausea/vomiting risk protocol (Adult = 18 yrs and Pediatric 3-17 yrs)- (430 and 463)  General inhalation anesthetic (NOT TIVA) with PONV risk factors: Yes  Provision of anti-emetic therapy with at least 2 different classes of agents: Yes   Patient DID NOT receive anti-emetic therapy and reason is documented in Medical Record:  N/A    Multimodal Pain Management- (AQI59)  Patient undergoing Elective Surgery (i.e. Outpatient, or ASC, or Prescheduled Surgery prior to Hospital Admission): Yes  Use of Multimodal Pain Management, two or more drugs and/or interventions, NOT including systemic opioids: Yes   Exception: Documented allergy to multiple classes of analgesics:  N/A    PACU assessment of acute postoperative pain prior to Anesthesia Care End- Applies to Patients Age = 18- (ABG7)  Initial PACU pain score is which of the following: < 7/10  Patient unable to report pain score: N/A    Post-anesthetic transfer of care checklist/protocol to PACU/ICU- (426 and 427)  Upon conclusion of case, patient transferred to which of the following locations: PACU/Non-ICU  Use of transfer checklist/protocol: Yes  Exclusion: Service Performed in Patient Hospital Room (and thus did not require transfer): N/A    PACU Reintubation- (AQI31)  General anesthesia requiring endotracheal intubation (ETT) along with subsequent extubation in OR or PACU: Yes  Required reintubation in the PACU: No   Extubation was a planned trial documented in the medical record prior to removal of the original airway device:  N/A    Unplanned admission to ICU related to anesthesia service up through end of PACU care- (MD51)  Unplanned admission to ICU (not initially anticipated at anesthesia start time): No

## 2019-07-24 NOTE — OR NURSING
Wife arrived at 1500 - dc instructions reviewed, questions answered.  Pt stable for discharge.  Home with all belongings.

## 2019-07-24 NOTE — PROGRESS NOTES
Med rec updated and complete  Allergies reviewed  Pt reports no vitamins or OTC's in over 2 weeks or longer.  Pt reports no antibiotics in the last 2 weeks

## 2019-07-24 NOTE — ANESTHESIA PREPROCEDURE EVALUATION
Relevant Problems   (+) Essential (primary) hypertension       Physical Exam    Airway   Mallampati: II  TM distance: >3 FB  Neck ROM: full       Cardiovascular - normal exam  Rhythm: regular  Rate: normal  (-) murmur     Dental - normal exam         Pulmonary - normal exam  Breath sounds clear to auscultation     Abdominal    Neurological - normal exam                 Anesthesia Plan    ASA 2       Plan - general       Airway plan will be ETT        Induction: intravenous    Postoperative Plan: Postoperative administration of opioids is intended.    Pertinent diagnostic labs and testing reviewed    Informed Consent:    Anesthetic plan and risks discussed with patient.    Use of blood products discussed with: patient whom consented to blood products.

## 2019-07-24 NOTE — ANESTHESIA PROCEDURE NOTES
Airway  Date/Time: 7/24/2019 11:17 AM  Performed by: TARA RUIZ  Authorized by: TARA RUIZ     Location:  OR  Urgency:  Elective  Indications for Airway Management:  Anesthesia  Spontaneous Ventilation: absent    Sedation Level:  Deep  Preoxygenated: Yes    Final Airway Type:  Supraglottic airway  Final Supraglottic Airway:  Standard LMA  SGA Size:  4  Number of Attempts at Approach:  1

## 2019-07-24 NOTE — ANESTHESIA POSTPROCEDURE EVALUATION
Patient: Chaparro Rosenberg    Procedure Summary     Date:  07/24/19 Room / Location:  [place] [address] / SURGERY [facility]    Anesthesia Start:  1055 Anesthesia Stop:  1227    Procedure:  HYDROCELECTOMY, ADULT (Left Scrotum) Diagnosis:  (LEFT HYDROCELE )    Surgeon:  Hi Mac M.D. Responsible Provider:  Mayank Landa M.D.    Anesthesia Type:  general ASA Status:  2          Final Anesthesia Type: general  Last vitals  BP   NIBP: 121/71    Temp   36.2 °C (97.1 °F)    Pulse   Pulse: (!) 56, Heart Rate (Monitored): (!) 58   Resp   16    SpO2   100 %      Anesthesia Post Evaluation    Patient location during evaluation: PACU  Patient participation: complete - patient participated  Level of consciousness: awake and alert  Pain score: 1    Airway patency: patent  Anesthetic complications: no  Cardiovascular status: hemodynamically stable  Respiratory status: acceptable  Hydration status: euvolemic    PONV: none           Nurse Pain Score: 0 (NPRS)

## 2019-07-24 NOTE — OP REPORT
DATE OF SERVICE:  07/24/2019    PREOPERATIVE DIAGNOSIS:  Left symptomatic hydrocele.    OPERATION PERFORMED:  Left hydrocelectomy with excision of hydrocele sac.    SURGEON:  Hi Mac MD    ANESTHESIA:  General laryngeal mask.    ANESTHESIOLOGIST:  Mayank Landa MD    POSTOPERATIVE DIAGNOSIS:  Left symptomatic hydrocele.    COMPLICATIONS:  None.    DRAINS:  Half-inch Penrose drain in the dependent portion of the left   hemiscrotum to gravity.    SPECIMENS:  Hydrocele sac to pathology for permanent section.    INDICATIONS:  The patient is a pleasant 64-year-old gentleman with a history   of prior right hydrocele.  His postoperative course was complicated with   infection and he subsequently has gone on to develop a symptomatic left   hydrocele, which is increasing in size and causing him discomfort during his   bicycle rides.  Prior to surgery, I had a lengthy discussion regarding the   risks and benefits of the procedure including, but not limited to the risk of   wound infection, risk of abscess formation, the risk of injury to the vas   deferens, risk of injury to the blood supply to the testicle.  He is also   aware of the potential risk of recurrence in 2% of cases and the perioperative   risk of deep vein thrombosis, pulmonary embolism, aspiration pneumonia, heart   attack, stroke, and death.  Informed consent was given to me by the patient   to proceed and he is marked on his left thigh with my initials, the letters   DH, for proper site surgery.    DESCRIPTION OF PROCEDURE IN DETAIL:  After informed consent was obtained, the   patient was brought to the operating room and placed in supine.  Bilateral   sequential compression devices were in place and operational.  General   laryngeal mask anesthetic administered in balanced fashion and the operative   area was shaved, Betadine prepped, and draped in the usual sterile fashion.    A surgical time-out was called.  All members of the operative team  agree as   the patient's name, procedure to be performed without objections, attention   was directed to the procedure.    I performed the procedure with loupe magnification.  I made a transverse   incision in the mid hemiscrotum on the left side with a 15 blade scalpel.    Dissection was carried sharply through the external and internal spermatic   fascia.  The tunica vaginalis identified, has a bluish color.  The complete   hydrocele was delivered from the wound.  Bleeding points were cauterized.  At   this point in time, the hydrocele sac is incised approximately 300 mL of   straw-colored fluid was removed.  The testicle surface itself on the tunica   albuginea has some evidence of inflammation.  I performed a resection of the   entire redundant tunica vaginalis.  Cautery was used circumferentially and   then a running locking suture of 4-0 Vicryl was placed around the edge of the   tunica vaginalis circumferentially.  At this point in time, because of the   inflammation of the tunica albuginea, I elected to leave a drain.  A   quarter-inch Penrose was tunneled through the dependent portion of the   hemiscrotum and secured with two 4-0 nylon sutures.  At this point in time,   the wound was copiously irrigated.  Bleeding points were cauterized and the   subcutaneous tissues were infiltrated with 0.25% Marcaine plain, approximately   7 mL were used.  Testicle was reintroduced into the left hemiscrotum and the   internal and external spermatic fascia reapproximated with a running locking   3-0 Vicryl and the skin was reapproximated with a running locking 3-0 chromic   suture.  At the end of the case, Collodium is placed on the wound.  A fluff   dressing and athletic supporter were applied.  He tolerated the procedure well   without complication, was awakened in the operating room, and transferred to   recovery room where he arrived in stable condition.       ____________________________________     Hi Mac,  MD DEGROOT / SHAKILA    DD:  07/24/2019 12:47:56  DT:  07/24/2019 13:05:03    D#:  [id number]  Job#:  [id number]

## 2019-07-24 NOTE — DISCHARGE INSTRUCTIONS
ACTIVITY: Rest and take it easy for the first 24 hours.  A responsible adult is recommended to remain with you during that time.  It is normal to feel sleepy.  We encourage you to not do anything that requires balance, judgment or coordination.    MILD FLU-LIKE SYMPTOMS ARE NORMAL. YOU MAY EXPERIENCE GENERALIZED MUSCLE ACHES, THROAT IRRITATION, HEADACHE AND/OR SOME NAUSEA.    FOR 24 HOURS DO NOT:  Drive, operate machinery or run household appliances.  Drink beer or alcoholic beverages.   Make important decisions or sign legal documents.    SPECIAL INSTRUCTIONS:    Diet: Clear liquid advance to regular as tolerated.    Follow-up for wound check on August 1st with Dr. Mac at 0845 AM.    Follow-up on 7/26 with office for penrose drain removal. Patient will be called by office for time    Ice to scrotum 20 minutes on/ 20 minutes off for 2-3 days while awake.    No lifting over 15 pounds for 2 weeks.    Keep wounds clean and dry for 72 hours then OK to shower.    DIET: To avoid nausea, slowly advance diet as tolerated, avoiding spicy or greasy foods for the first day.  Add more substantial food to your diet according to your physician's instructions.  Babies can be fed formula or breast milk as soon as they are hungry.  INCREASE FLUIDS AND FIBER TO AVOID CONSTIPATION.    SURGICAL DRESSING/BATHING: See above.    FOLLOW-UP APPOINTMENT:  A follow-up appointment should be arranged with your doctor on 7/26 for penrose drain removal. The office will call to schedule a time.    You should CALL YOUR PHYSICIAN if you develop:  Fever greater than 101 degrees F.  Pain not relieved by medication, or persistent nausea or vomiting.  Excessive bleeding (blood soaking through dressing) or unexpected drainage from the wound.  Extreme redness or swelling around the incision site, drainage of pus or foul smelling drainage.  Inability to urinate or empty your bladder within 8 hours.  Problems with breathing or chest pain.    You should  call [phone] if you develop problems with breathing or chest pain.  If you are unable to contact your doctor or surgical center, you should go to the nearest emergency room or urgent care center.  Physician's telephone #: Dr. Mac [phone]    If any questions arise, call your doctor.  If your doctor is not available, please feel free to call the Surgical Center at [phone].  The Center is open Monday through Friday from 7AM to 7PM.  You can also call the HEALTH HOTLINE open 24 hours/day, 7 days/week and speak to a nurse at [phone], or toll free at [phone].    A registered nurse may call you a few days after your surgery to see how you are doing after your procedure.    MEDICATIONS: Resume taking daily medication.  Take prescribed pain medication with food.  If no medication is prescribed, you may take non-aspirin pain medication if needed.  PAIN MEDICATION CAN BE VERY CONSTIPATING.  Take a stool softener or laxative such as senokot, pericolace, or milk of magnesia if needed.    Prescription given for NORCO (pain).  Last pain medication given at 9:51am (tylenol).    If your physician has prescribed pain medication that includes Acetaminophen (Tylenol), do not take additional Acetaminophen (Tylenol) while taking the prescribed medication.    Depression / Suicide Risk    As you are discharged from this [facility] facility, it is important to learn how to keep safe from harming yourself.    Recognize the warning signs:  · Abrupt changes in personality, positive or negative- including increase in energy   · Giving away possessions  · Change in eating patterns- significant weight changes-  positive or negative  · Change in sleeping patterns- unable to sleep or sleeping all the time   · Unwillingness or inability to communicate  · Depression  · Unusual sadness, discouragement and loneliness  · Talk of wanting to die  · Neglect of personal appearance   · Rebelliousness- reckless behavior  · Withdrawal  from people/activities they love  · Confusion- inability to concentrate     If you or a loved one observes any of these behaviors or has concerns about self-harm, here's what you can do:  · Talk about it- your feelings and reasons for harming yourself  · Remove any means that you might use to hurt yourself (examples: pills, rope, extension cords, firearm)  · Get professional help from the community (Mental Health, Substance Abuse, psychological counseling)  · Do not be alone:Call your Safe Contact- someone whom you trust who will be there for you.  · Call your local CRISIS HOTLINE 240-3781 or 770-167-4169  · Call your local Children's Mobile Crisis Response Team [place] (147) 196-9616 or www.ezTaxi  · Call the toll free National Suicide Prevention Hotlines   · National Suicide Prevention Lifeline 628-845-XXCF (1887)  · National Hope Line Network 800-SUICIDE (846-3749)

## 2019-07-24 NOTE — ANESTHESIA TIME REPORT
Anesthesia Start and Stop Event Times     Date Time Event    7/24/2019 1055 Anesthesia Start     1227 Anesthesia Stop        Responsible Staff  07/24/19    Name Role Begin End    Mayank Landa M.D. Anesth 1055 1227        Preop Diagnosis (Free Text):  Pre-op Diagnosis     LEFT HYDROCELE         Preop Diagnosis (Codes):  Diagnosis Information     Diagnosis Code(s):         Post op Diagnosis  Hydrocele      Premium Reason  Non-Premium    Comments:

## 2019-07-24 NOTE — OR NURSING
Rec pt from PACU at 1342 A&O, denies pain.  Scrotal dressing has scant amount of serosang drainage.  Penrose drain present.  Assisted OOB to recliner.  VSS.  Pt dressed and ambulated to BR and stated he emptied his bladder.  Returned to room and oriented to room.  Call light within reach.  Wife not at hospital, on her way.

## 2019-09-11 DIAGNOSIS — I10 ESSENTIAL (PRIMARY) HYPERTENSION: ICD-10-CM

## 2019-09-11 RX ORDER — LOSARTAN POTASSIUM 100 MG/1
TABLET ORAL
Qty: 90 TAB | Refills: 1 | Status: SHIPPED | OUTPATIENT
Start: 2019-09-11 | End: 2020-03-09

## 2019-09-11 NOTE — TELEPHONE ENCOUNTER
Was the patient seen in the last year in this department? Yes  5/8/19  Does patient have an active prescription for medications requested? No     Received Request Via: Pharmacy

## 2020-06-08 DIAGNOSIS — I10 ESSENTIAL (PRIMARY) HYPERTENSION: ICD-10-CM

## 2020-06-08 RX ORDER — LOSARTAN POTASSIUM 100 MG/1
TABLET ORAL
Qty: 90 TAB | Refills: 0 | Status: SHIPPED | OUTPATIENT
Start: 2020-06-08 | End: 2020-09-10 | Stop reason: SDUPTHER

## 2020-06-08 NOTE — TELEPHONE ENCOUNTER
Received request via: Pharmacy    Was the patient seen in the last year in this department? No     Does the patient have an active prescription (recently filled or refills available) for medication(s) requested? No     LOSARTAN POTASSIUM 100MG TABS

## 2020-09-04 ENCOUNTER — TELEPHONE (OUTPATIENT)
Dept: MEDICAL GROUP | Facility: LAB | Age: 65
End: 2020-09-04

## 2020-09-16 ENCOUNTER — TELEMEDICINE (OUTPATIENT)
Dept: MEDICAL GROUP | Facility: LAB | Age: 65
End: 2020-09-16
Payer: MEDICARE

## 2020-09-16 VITALS — HEIGHT: 64 IN | BODY MASS INDEX: 26.98 KG/M2 | WEIGHT: 158 LBS

## 2020-09-16 DIAGNOSIS — I10 ESSENTIAL (PRIMARY) HYPERTENSION: ICD-10-CM

## 2020-09-16 DIAGNOSIS — E78.2 MULTIPLE-TYPE HYPERLIPIDEMIA: ICD-10-CM

## 2020-09-16 DIAGNOSIS — R73.01 ELEVATED FASTING GLUCOSE: ICD-10-CM

## 2020-09-16 PROCEDURE — 99213 OFFICE O/P EST LOW 20 MIN: CPT | Mod: 95,CR | Performed by: FAMILY MEDICINE

## 2020-09-16 RX ORDER — LOSARTAN POTASSIUM 100 MG/1
100 TABLET ORAL
Qty: 90 TAB | Refills: 3 | Status: SHIPPED | OUTPATIENT
Start: 2020-09-16 | End: 2020-10-07

## 2020-09-16 ASSESSMENT — FIBROSIS 4 INDEX: FIB4 SCORE: 1.37

## 2020-09-16 NOTE — PROGRESS NOTES
Virtual Visit: Established Patient   This visit was conducted via Zoom using secure and encrypted videoconferencing technology. The patient was in a private location in the state of [place].    The patient's identity was confirmed and verbal consent was obtained for this virtual visit.    Subjective:   CC:   Chief Complaint   Patient presents with   • Medication Refill     LOV 5/2019       Chaparro Rosenberg is a 65 y.o. male presenting for evaluation and management of:    Essential (primary) hypertension  Stable. Currently taking losartan 100 mg as directed.   He is not taking baby aspirin daily.   He is not monitoring BP at home.   Denies symptoms low BP: light-headed, tunnel-vision, unusual fatigue.   Denies symptoms high BP:pounding headache, visual changes, palpitations, flushed face.   Denies medicine side effects: unusual fatigue, slow heartbeat, foot/leg swelling, cough.            ROS   Denies any recent fevers or chills. No nausea or vomiting. No chest pains or shortness of breath.     No Known Allergies    Current medicines (including changes today)  Current Outpatient Medications   Medication Sig Dispense Refill   • losartan (COZAAR) 100 MG Tab Take 1 Tab by mouth every day. 90 Tab 3     No current facility-administered medications for this visit.        Patient Active Problem List    Diagnosis Date Noted   • Cellulitis 03/29/2018     Priority: High   • Elevated fasting glucose 09/16/2020   • Syncope 05/08/2019   • Family history of stroke 05/08/2019   • History of tobacco abuse 05/08/2019   • Rupture of left distal biceps tendon 07/31/2018   • BPH with obstruction/lower urinary tract symptoms 06/02/2017   • Spermatocele 06/02/2017   • Essential (primary) hypertension 03/23/2016   • Multiple-type hyperlipidemia 03/23/2016       Family History   Problem Relation Age of Onset   • Arthritis Mother    • Stroke Mother    • Hypertension Father        He  has a past medical history of Hyperlipidemia and  "Hypertension.  He  has a past surgical history that includes hernia repair; scrotum incision and drainage (Right, 4/2/2018); tonsillectomy; other abdominal surgery; hydrocelectomy adult (Right, 04/2018); and hydrocelectomy adult (Left, 7/24/2019).       Objective:   Ht 1.626 m (5' 4\")   Wt 71.7 kg (158 lb)   BMI 27.12 kg/m²     Physical Exam:  Constitutional: Alert, no distress, well-groomed.  Skin: No rashes in visible areas.  Eye: Round. Conjunctiva clear, lids normal. No icterus.   ENMT: Lips pink without lesions, good dentition, moist mucous membranes. Phonation normal.  Neck: No masses, no thyromegaly. Moves freely without pain.  Respiratory: Unlabored respiratory effort, no cough or audible wheeze  Psych: Alert and oriented x3, normal affect and mood.       Assessment and Plan:   The following treatment plan was discussed:   1. Essential (primary) hypertension  This is a chronic medical condition that is currently stable   Continue losartan 100 mg daily.  Patient does have a blood pressure cuff at home and have asked him to check his blood pressure once a week.  Continue regular exercise program.  Dash eating plan  - Comp Metabolic Panel; Future  - losartan (COZAAR) 100 MG Tab; Take 1 Tab by mouth every day.  Dispense: 90 Tab; Refill: 3    2. Multiple-type hyperlipidemia  Check labs.  Await results  - Lipid Profile; Future  - TSH; Future    3. Elevated fasting glucose  Labs.  Await results  - Comp Metabolic Panel; Future  - HEMOGLOBIN A1C; Future        Follow-up: Return in about 1 year (around 9/16/2021).           "

## 2020-09-16 NOTE — ASSESSMENT & PLAN NOTE
Stable. Currently taking losartan 100 mg as directed.   He is not taking baby aspirin daily.   He is not monitoring BP at home.   Denies symptoms low BP: light-headed, tunnel-vision, unusual fatigue.   Denies symptoms high BP:pounding headache, visual changes, palpitations, flushed face.   Denies medicine side effects: unusual fatigue, slow heartbeat, foot/leg swelling, cough.

## 2020-10-07 DIAGNOSIS — I10 ESSENTIAL (PRIMARY) HYPERTENSION: ICD-10-CM

## 2020-10-07 RX ORDER — LOSARTAN POTASSIUM 100 MG/1
TABLET ORAL
Qty: 30 TAB | Refills: 4 | Status: SHIPPED | OUTPATIENT
Start: 2020-10-07 | End: 2021-09-27

## 2020-10-07 NOTE — TELEPHONE ENCOUNTER
Received request via: Pharmacy  30 tabs  Was the patient seen in the last year in this department? Yes  9/16/20  Does the patient have an active prescription (recently filled or refills available) for medication(s) requested? No

## 2021-02-12 ENCOUNTER — HOSPITAL ENCOUNTER (OUTPATIENT)
Dept: LAB | Facility: MEDICAL CENTER | Age: 66
End: 2021-02-12
Attending: UROLOGY
Payer: MEDICARE

## 2021-02-12 PROCEDURE — 84153 ASSAY OF PSA TOTAL: CPT | Mod: GA

## 2021-02-12 PROCEDURE — 36415 COLL VENOUS BLD VENIPUNCTURE: CPT | Mod: GA

## 2021-02-13 LAB — PSA SERPL-MCNC: 1.22 NG/ML (ref 0–4)

## 2021-03-03 ENCOUNTER — HOSPITAL ENCOUNTER (OUTPATIENT)
Dept: LAB | Facility: MEDICAL CENTER | Age: 66
End: 2021-03-03
Attending: UROLOGY
Payer: MEDICARE

## 2021-04-09 ENCOUNTER — HOSPITAL ENCOUNTER (OUTPATIENT)
Dept: LAB | Facility: MEDICAL CENTER | Age: 66
End: 2021-04-09
Attending: UROLOGY
Payer: MEDICARE

## 2021-04-09 LAB — MAGNESIUM SERPL-MCNC: 2.3 MG/DL (ref 1.5–2.5)

## 2021-04-09 PROCEDURE — 84630 ASSAY OF ZINC: CPT

## 2021-04-09 PROCEDURE — 36415 COLL VENOUS BLD VENIPUNCTURE: CPT

## 2021-04-09 PROCEDURE — 82306 VITAMIN D 25 HYDROXY: CPT

## 2021-04-09 PROCEDURE — 83735 ASSAY OF MAGNESIUM: CPT | Mod: GA

## 2021-04-12 LAB — 25(OH)D3 SERPL-MCNC: 31 NG/ML (ref 30–80)

## 2021-04-13 LAB — ZINC SERPL-MCNC: 122 UG/DL (ref 60–120)

## 2021-05-12 ENCOUNTER — OFFICE VISIT (OUTPATIENT)
Dept: MEDICAL GROUP | Facility: LAB | Age: 66
End: 2021-05-12
Payer: MEDICARE

## 2021-05-12 VITALS
DIASTOLIC BLOOD PRESSURE: 70 MMHG | RESPIRATION RATE: 16 BRPM | HEART RATE: 64 BPM | OXYGEN SATURATION: 96 % | WEIGHT: 164 LBS | HEIGHT: 69 IN | SYSTOLIC BLOOD PRESSURE: 120 MMHG | BODY MASS INDEX: 24.29 KG/M2 | TEMPERATURE: 98.1 F

## 2021-05-12 DIAGNOSIS — M54.16 LUMBAR RADICULOPATHY: ICD-10-CM

## 2021-05-12 DIAGNOSIS — M54.31 SCIATICA OF RIGHT SIDE: ICD-10-CM

## 2021-05-12 PROCEDURE — 99214 OFFICE O/P EST MOD 30 MIN: CPT | Performed by: FAMILY MEDICINE

## 2021-05-12 RX ORDER — DICLOFENAC SODIUM 75 MG/1
75 TABLET, DELAYED RELEASE ORAL 2 TIMES DAILY
Qty: 30 TABLET | Refills: 0 | Status: SHIPPED | OUTPATIENT
Start: 2021-05-12

## 2021-05-12 RX ORDER — HYDROCODONE BITARTRATE AND ACETAMINOPHEN 5; 325 MG/1; MG/1
1 TABLET ORAL EVERY 4 HOURS PRN
Qty: 20 TABLET | Refills: 0 | Status: SHIPPED | OUTPATIENT
Start: 2021-05-12 | End: 2021-05-19

## 2021-05-12 RX ORDER — BACLOFEN 10 MG/1
10 TABLET ORAL 3 TIMES DAILY PRN
Qty: 20 TABLET | Refills: 0 | Status: SHIPPED | OUTPATIENT
Start: 2021-05-12 | End: 2021-05-18 | Stop reason: SDUPTHER

## 2021-05-12 ASSESSMENT — FIBROSIS 4 INDEX: FIB4 SCORE: 1.39

## 2021-05-12 NOTE — PATIENT INSTRUCTIONS
Sciatica Rehab  Ask your health care provider which exercises are safe for you. Do exercises exactly as told by your health care provider and adjust them as directed. It is normal to feel mild stretching, pulling, tightness, or discomfort as you do these exercises. Stop right away if you feel sudden pain or your pain gets worse. Do not begin these exercises until told by your health care provider.  Stretching and range-of-motion exercises  These exercises warm up your muscles and joints and improve the movement and flexibility of your hips and back. These exercises also help to relieve pain, numbness, and tingling.  Sciatic nerve glide  1. Sit in a chair with your head facing down toward your chest. Place your hands behind your back. Let your shoulders slump forward.  2. Slowly straighten one of your legs while you tilt your head back as if you are looking toward the ceiling. Only straighten your leg as far as you can without making your symptoms worse.  3. Hold this position for __________ seconds.  4. Slowly return to the starting position.  5. Repeat with your other leg.  Repeat __________ times. Complete this exercise __________ times a day.  Knee to chest with hip adduction and internal rotation    1. Lie on your back on a firm surface with both legs straight.  2. Bend one of your knees and move it up toward your chest until you feel a gentle stretch in your lower back and buttock. Then, move your knee toward the shoulder that is on the opposite side from your leg. This is hip adduction and internal rotation.  ? Hold your leg in this position by holding on to the front of your knee.  3. Hold this position for __________ seconds.  4. Slowly return to the starting position.  5. Repeat with your other leg.  Repeat __________ times. Complete this exercise __________ times a day.  Prone extension on elbows    1. Lie on your abdomen on a firm surface. A bed may be too soft for this exercise.  2. Prop yourself up on  your elbows.  3. Use your arms to help lift your chest up until you feel a gentle stretch in your abdomen and your lower back.  ? This will place some of your body weight on your elbows. If this is uncomfortable, try stacking pillows under your chest.  ? Your hips should stay down, against the surface that you are lying on. Keep your hip and back muscles relaxed.  4. Hold this position for __________ seconds.  5. Slowly relax your upper body and return to the starting position.  Repeat __________ times. Complete this exercise __________ times a day.  Strengthening exercises  These exercises build strength and endurance in your back. Endurance is the ability to use your muscles for a long time, even after they get tired.  Pelvic tilt  This exercise strengthens the muscles that lie deep in the abdomen.  1. Lie on your back on a firm surface. Bend your knees and keep your feet flat on the floor.  2. Tense your abdominal muscles. Tip your pelvis up toward the ceiling and flatten your lower back into the floor.  ? To help with this exercise, you may place a small towel under your lower back and try to push your back into the towel.  3. Hold this position for __________ seconds.  4. Let your muscles relax completely before you repeat this exercise.  Repeat __________ times. Complete this exercise __________ times a day.  Alternating arm and leg raises    1. Get on your hands and knees on a firm surface. If you are on a hard floor, you may want to use padding, such as an exercise mat, to cushion your knees.  2. Line up your arms and legs. Your hands should be directly below your shoulders, and your knees should be directly below your hips.  3. Lift your left leg behind you. At the same time, raise your right arm and straighten it in front of you.  ? Do not lift your leg higher than your hip.  ? Do not lift your arm higher than your shoulder.  ? Keep your abdominal and back muscles tight.  ? Keep your hips facing the  ground.  ? Do not arch your back.  ? Keep your balance carefully, and do not hold your breath.  4. Hold this position for __________ seconds.  5. Slowly return to the starting position.  6. Repeat with your right leg and your left arm.  Repeat __________ times. Complete this exercise __________ times a day.  Posture and body mechanics  Good posture and healthy body mechanics can help to relieve stress in your body's tissues and joints. Body mechanics refers to the movements and positions of your body while you do your daily activities. Posture is part of body mechanics. Good posture means:  · Your spine is in its natural S-curve position (neutral).  · Your shoulders are pulled back slightly.  · Your head is not tipped forward.  Follow these guidelines to improve your posture and body mechanics in your everyday activities.  Standing    · When standing, keep your spine neutral and your feet about hip width apart. Keep a slight bend in your knees. Your ears, shoulders, and hips should line up.  · When you do a task in which you  one place for a long time, place one foot up on a stable object that is 2-4 inches (5-10 cm) high, such as a footstool. This helps keep your spine neutral.  Sitting    · When sitting, keep your spine neutral and keep your feet flat on the floor. Use a footrest, if necessary, and keep your thighs parallel to the floor. Avoid rounding your shoulders, and avoid tilting your head forward.  · When working at a desk or a computer, keep your desk at a height where your hands are slightly lower than your elbows. Slide your chair under your desk so you are close enough to maintain good posture.  · When working at a computer, place your monitor at a height where you are looking straight ahead and you do not have to tilt your head forward or downward to look at the screen.  Resting  · When lying down and resting, avoid positions that are most painful for you.  · If you have pain with activities  such as sitting, bending, stooping, or squatting, lie in a position in which your body does not bend very much. For example, avoid curling up on your side with your arms and knees near your chest (fetal position).  · If you have pain with activities such as standing for a long time or reaching with your arms, lie with your spine in a neutral position and bend your knees slightly. Try the following positions:  ? Lying on your side with a pillow between your knees.  ? Lying on your back with a pillow under your knees.  Lifting    · When lifting objects, keep your feet at least shoulder width apart and tighten your abdominal muscles.  · Bend your knees and hips and keep your spine neutral. It is important to lift using the strength of your legs, not your back. Do not lock your knees straight out.  · Always ask for help to lift heavy or awkward objects.  This information is not intended to replace advice given to you by your health care provider. Make sure you discuss any questions you have with your health care provider.  Document Released: 12/18/2006 Document Revised: 04/10/2020 Document Reviewed: 01/09/2020  Elsevier Patient Education © 2020 [employer]  Sciatica    Sciatica is pain, numbness, weakness, or tingling along the path of the sciatic nerve. The sciatic nerve starts in the lower back and runs down the back of each leg. The nerve controls the muscles in the lower leg and in the back of the knee. It also provides feeling (sensation) to the back of the thigh, the lower leg, and the sole of the foot. Sciatica is a symptom of another medical condition that pinches or puts pressure on the sciatic nerve.  Sciatica most often only affects one side of the body. Sciatica usually goes away on its own or with treatment. In some cases, sciatica may come back (recur).  What are the causes?  This condition is caused by pressure on the sciatic nerve or pinching of the nerve. This may be the result of:  · A disk in  between the bones of the spine bulging out too far (herniated disk).  · Age-related changes in the spinal disks.  · A pain disorder that affects a muscle in the buttock.  · Extra bone growth near the sciatic nerve.  · A break (fracture) of the pelvis.  · Pregnancy.  · Tumor. This is rare.  What increases the risk?  The following factors may make you more likely to develop this condition:  · Playing sports that place pressure or stress on the spine.  · Having poor strength and flexibility.  · A history of back injury or surgery.  · Sitting for long periods of time.  · Doing activities that involve repetitive bending or lifting.  · Obesity.  What are the signs or symptoms?  Symptoms can vary from mild to very severe, and they may include:  · Any of these problems in the lower back, leg, hip, or buttock:  ? Mild tingling, numbness, or dull aches.  ? Burning sensations.  ? Sharp pains.  · Numbness in the back of the calf or the sole of the foot.  · Leg weakness.  · Severe back pain that makes movement difficult.  Symptoms may get worse when you cough, sneeze, or laugh, or when you sit or stand for long periods of time.  How is this diagnosed?  This condition may be diagnosed based on:  · Your symptoms and medical history.  · A physical exam.  · Blood tests.  · Imaging tests, such as:  ? X-rays.  ? MRI.  ? CT scan.  How is this treated?  In many cases, this condition improves on its own without treatment. However, treatment may include:  · Reducing or modifying physical activity.  · Exercising and stretching.  · Icing and applying heat to the affected area.  · Medicines that help to:  ? Relieve pain and swelling.  ? Relax your muscles.  · Injections of medicines that help to relieve pain, irritation, and inflammation around the sciatic nerve (steroids).  · Surgery.  Follow these instructions at home:  Medicines  · Take over-the-counter and prescription medicines only as told by your health care provider.  · Ask your  health care provider if the medicine prescribed to you:  ? Requires you to avoid driving or using heavy machinery.  ? Can cause constipation. You may need to take these actions to prevent or treat constipation:  § Drink enough fluid to keep your urine pale yellow.  § Take over-the-counter or prescription medicines.  § Eat foods that are high in fiber, such as beans, whole grains, and fresh fruits and vegetables.  § Limit foods that are high in fat and processed sugars, such as fried or sweet foods.  Managing pain         · If directed, put ice on the affected area.  ? Put ice in a plastic bag.  ? Place a towel between your skin and the bag.  ? Leave the ice on for 20 minutes, 2-3 times a day.  · If directed, apply heat to the affected area. Use the heat source that your health care provider recommends, such as a moist heat pack or a heating pad.  ? Place a towel between your skin and the heat source.  ? Leave the heat on for 20-30 minutes.  ? Remove the heat if your skin turns bright red. This is especially important if you are unable to feel pain, heat, or cold. You may have a greater risk of getting burned.  Activity    · Return to your normal activities as told by your health care provider. Ask your health care provider what activities are safe for you.  · Avoid activities that make your symptoms worse.  · Take brief periods of rest throughout the day.  ? When you rest for longer periods, mix in some mild activity or stretching between periods of rest. This will help to prevent stiffness and pain.  ? Avoid sitting for long periods of time without moving. Get up and move around at least one time each hour.  · Exercise and stretch regularly, as told by your health care provider.  · Do not lift anything that is heavier than 10 lb (4.5 kg) while you have symptoms of sciatica. When you do not have symptoms, you should still avoid heavy lifting, especially repetitive heavy lifting.  · When you lift objects, always use  proper lifting technique, which includes:  ? Bending your knees.  ? Keeping the load close to your body.  ? Avoiding twisting.  General instructions  · Maintain a healthy weight. Excess weight puts extra stress on your back.  · Wear supportive, comfortable shoes. Avoid wearing high heels.  · Avoid sleeping on a mattress that is too soft or too hard. A mattress that is firm enough to support your back when you sleep may help to reduce your pain.  · Keep all follow-up visits as told by your health care provider. This is important.  Contact a health care provider if:  · You have pain that:  ? Wakes you up when you are sleeping.  ? Gets worse when you lie down.  ? Is worse than you have experienced in the past.  ? Lasts longer than 4 weeks.  · You have an unexplained weight loss.  Get help right away if:  · You are not able to control when you urinate or have bowel movements (incontinence).  · You have:  ? Weakness in your lower back, pelvis, buttocks, or legs that gets worse.  ? Redness or swelling of your back.  ? A burning sensation when you urinate.  Summary  · Sciatica is pain, numbness, weakness, or tingling along the path of the sciatic nerve.  · This condition is caused by pressure on the sciatic nerve or pinching of the nerve.  · Sciatica can cause pain, numbness, or tingling in the lower back, legs, hips, and buttocks.  · Treatment often includes rest, exercise, medicines, and applying ice or heat.  This information is not intended to replace advice given to you by your health care provider. Make sure you discuss any questions you have with your health care provider.  Document Released: 12/12/2002 Document Revised: 01/06/2020 Document Reviewed: 01/06/2020  Elsevier Patient Education © 2020 [employer]

## 2021-05-18 ENCOUNTER — PATIENT MESSAGE (OUTPATIENT)
Dept: MEDICAL GROUP | Facility: LAB | Age: 66
End: 2021-05-18

## 2021-05-18 DIAGNOSIS — M54.31 SCIATICA OF RIGHT SIDE: ICD-10-CM

## 2021-05-18 DIAGNOSIS — M54.16 LUMBAR RADICULOPATHY: ICD-10-CM

## 2021-05-18 RX ORDER — BACLOFEN 10 MG/1
10 TABLET ORAL 3 TIMES DAILY PRN
Qty: 20 TABLET | Refills: 0 | Status: SHIPPED | OUTPATIENT
Start: 2021-05-18

## 2021-05-18 NOTE — PATIENT COMMUNICATION
I only have 4 muscle relaxant pills left.  They seem to be helping but I am going out of town for a few days and want to make sure I am comfortable and continue making progress while I am gone.  My MRI is not until 5/27.  Can I get a refill?  We leave Thursday morning.    Received request via: Pharmacy    Was the patient seen in the last year in this department? Yes  5/12/2021  Does the patient have an active prescription (recently filled or refills available) for medication(s) requested? No

## 2021-05-18 NOTE — TELEPHONE ENCOUNTER
----- Message from Chaparro Rosenberg sent at 5/18/2021  8:49 AM PDT -----  Regarding: Prescription Question  Contact: [phone]  Jose M Coulter-  I only have 4 muscle relaxant pills left.  They seem to be helping but I am going out of town for a few days and want to make sure I am comfortable and continue making progress while I am gone.  My MRI is not until 5/27.  Can I get a refill?  We leave Thursday morning.  --Franklin Rosenberg

## 2021-05-22 NOTE — PROGRESS NOTES
"Subjective:     Chief Complaint   Patient presents with   • Back Pain       Chaparro Rosenberg is a 66 y.o. male here today for evaluation and management of:    Lumbar radiculopathy  Back Pain: Location/quality of pain: right low back pain  Radiation? yes - down right leg to the calf  Course of symptoms: No initiating  Event,  Has been seeing the chiropractor and that has helped short term but it is affecting his sleep    Initial precipitant of symptoms: none. Time of day when symptoms are worst: nighttime. Exacerbating factors identifiable by patient: sitting, walking, recumbency.  Treatments so far initiated by patient: chiropractor and ibuprofen    Previous back problems: none. Previous workup: none. Previous treatment: none    CURRENT LIMITATIONS OF FUNCTION:  Sitting, standing, walking:   Lifting weights:  Activities limited to this degree since moderate.     No \"RED FLAGS’ FOR FRACTURE: (Recent major trauma, Minor trauma or strenuous lifting in older or potentially osteoporotic patient, History of chronic corticosteroid therapy)  No \"RED FLAGS\" FOR NEOPLASM OR INFECTION: (Age over 50 or under 20, History of cancer, ester. breast, lung, prostate, Recent fever/chills, Recent unexplained weight loss,  Recent bacterial infection, current IV drug use, Immunosuppression (from meds, HIV, etc.), Pain worse when supine or at night)  Age only  No \"RED FLAGS’ FOR CAUDA EQUINA SYNDROME: (Saddle anesthesia, urine retention, fecal incontinence, Severe or progressive LE neurologic deficit)             No Known Allergies    Current medicines (including changes today)  Current Outpatient Medications   Medication Sig Dispense Refill   • diclofenac DR (VOLTAREN) 75 MG Tablet Delayed Response Take 1 tablet by mouth 2 times a day. 30 tablet 0   • baclofen (LIORESAL) 10 MG Tab Take 1 tablet by mouth 3 times a day as needed (spasm). 20 tablet 0   • losartan (COZAAR) 100 MG Tab TAKE ONE TABLET BY MOUTH EVERY DAY 30 Tab 4     No " "current facility-administered medications for this visit.       He  has a past medical history of Hyperlipidemia and Hypertension.    Patient Active Problem List    Diagnosis Date Noted   • Cellulitis 03/29/2018     Priority: High   • Sciatica of right side 05/12/2021   • Lumbar radiculopathy 05/12/2021   • Elevated fasting glucose 09/16/2020   • Syncope 05/08/2019   • Family history of stroke 05/08/2019   • History of tobacco abuse 05/08/2019   • Rupture of left distal biceps tendon 07/31/2018   • BPH with obstruction/lower urinary tract symptoms 06/02/2017   • Spermatocele 06/02/2017   • Essential (primary) hypertension 03/23/2016   • Multiple-type hyperlipidemia 03/23/2016       ROS   No fever or chills.  No nausea or vomiting.  No chest pain or palpitations.  No cough or SOB.  No pain with urination or hematuria.  No black or bloody stools.       Objective:     /70 (BP Location: Right arm, Patient Position: Sitting, BP Cuff Size: Adult)   Pulse 64   Temp 36.7 °C (98.1 °F) (Temporal)   Resp 16   Ht 1.753 m (5' 9\")   Wt 74.4 kg (164 lb)   SpO2 96%  Body mass index is 24.22 kg/m².   Physical Exam:  Well developed, well nourished.  Alert, oriented in moderate acute distress.  Eye contact is good, speech goal directed, affect calm  Eyes: conjunctiva non-injected, sclera non-icteric.  Neck Supple.  No adenopathy or masses in the neck or supraclavicular regions. No thyromegaly  Lungs: clear to auscultation bilaterally with good excursion. No wheezes or rhonchi  CV: regular rate and rhythm. No murmur  SPINE: No significant spinal curvature on forward bend. Mild tenderness in paraspinous muscles lumbar spine without current spasm. SLT negative. DTR 2+ patella, 1+ achilles bilaterally. Strength 5/5 proximal and distal LE.  No pain with stress of SI. Full hip ROM. Poor hamstring flexibility. No symptoms with axial loading.       Assessment and Plan:   The following treatment plan was discussed    1. Sciatica of " right side  Diclofenac and baclofen given  Low opioid risk and opioid consent signed.  Norco for severe pain short term.  Exercises given.  Refer ti PT,  If not improving patient will schedule an MRI  - MR-LUMBAR SPINE-W/O; Future  - diclofenac DR (VOLTAREN) 75 MG Tablet Delayed Response; Take 1 tablet by mouth 2 times a day.  Dispense: 30 tablet; Refill: 0  - HYDROcodone-acetaminophen (NORCO) 5-325 MG Tab per tablet; Take 1 tablet by mouth every four hours as needed (severe pain) for up to 7 days.  Dispense: 20 tablet; Refill: 0  - Consent for Opiate Prescription    2. Lumbar radiculopathy  Diclofenac and baclofen given  Low opioid risk and opioid consent signed.  Norco for severe pain short term.  Exercises given.  Refer ti PT,  If not improving patient will schedule an MRI  - MR-LUMBAR SPINE-W/O; Future  - diclofenac DR (VOLTAREN) 75 MG Tablet Delayed Response; Take 1 tablet by mouth 2 times a day.  Dispense: 30 tablet; Refill: 0  - HYDROcodone-acetaminophen (NORCO) 5-325 MG Tab per tablet; Take 1 tablet by mouth every four hours as needed (severe pain) for up to 7 days.  Dispense: 20 tablet; Refill: 0  - Consent for Opiate Prescription    Any change or worsening of signs or symptoms, patient encouraged to follow-up or report to the emergency room for further evaluation. Patient understands and agrees.    Followup: Return if symptoms worsen or fail to improve.

## 2021-05-22 NOTE — ASSESSMENT & PLAN NOTE
"Back Pain: Location/quality of pain: right low back pain  Radiation? yes - down right leg to the calf  Course of symptoms: No initiating  Event,  Has been seeing the chiropractor and that has helped short term but it is affecting his sleep    Initial precipitant of symptoms: none. Time of day when symptoms are worst: nighttime. Exacerbating factors identifiable by patient: sitting, walking, recumbency.  Treatments so far initiated by patient: chiropractor and ibuprofen    Previous back problems: none. Previous workup: none. Previous treatment: none    CURRENT LIMITATIONS OF FUNCTION:  Sitting, standing, walking:   Lifting weights:  Activities limited to this degree since moderate.     No \"RED FLAGS’ FOR FRACTURE: (Recent major trauma, Minor trauma or strenuous lifting in older or potentially osteoporotic patient, History of chronic corticosteroid therapy)  No \"RED FLAGS\" FOR NEOPLASM OR INFECTION: (Age over 50 or under 20, History of cancer, ester. breast, lung, prostate, Recent fever/chills, Recent unexplained weight loss,  Recent bacterial infection, current IV drug use, Immunosuppression (from meds, HIV, etc.), Pain worse when supine or at night)  Age only  No \"RED FLAGS’ FOR CAUDA EQUINA SYNDROME: (Saddle anesthesia, urine retention, fecal incontinence, Severe or progressive LE neurologic deficit)        "

## 2021-05-27 ENCOUNTER — APPOINTMENT (OUTPATIENT)
Dept: RADIOLOGY | Facility: MEDICAL CENTER | Age: 66
End: 2021-05-27
Attending: FAMILY MEDICINE
Payer: MEDICARE

## 2021-05-28 ENCOUNTER — PATIENT MESSAGE (OUTPATIENT)
Dept: MEDICAL GROUP | Facility: LAB | Age: 66
End: 2021-05-28

## 2021-05-28 ENCOUNTER — HOSPITAL ENCOUNTER (OUTPATIENT)
Dept: RADIOLOGY | Facility: MEDICAL CENTER | Age: 66
End: 2021-05-28
Attending: FAMILY MEDICINE
Payer: MEDICARE

## 2021-05-28 DIAGNOSIS — M54.31 SCIATICA OF RIGHT SIDE: ICD-10-CM

## 2021-05-28 DIAGNOSIS — M54.16 LUMBAR RADICULOPATHY: ICD-10-CM

## 2021-05-28 PROCEDURE — 72148 MRI LUMBAR SPINE W/O DYE: CPT | Mod: MF

## 2021-05-28 NOTE — PROGRESS NOTES
Called patient to discuss MRI results. Discussed PT and PM&R referrals. Patient states that pain has been getting better with use of muscle relaxers and stretching. Patient will call to schedule appointments for referrals.

## 2021-05-28 NOTE — PATIENT COMMUNICATION
"I advised pt of Bettie's message on the MRI results.  He is still asking to speak to someone that can answer \"technical questions\".    "

## 2021-06-09 ENCOUNTER — TELEPHONE (OUTPATIENT)
Dept: MEDICAL GROUP | Facility: LAB | Age: 66
End: 2021-06-09

## 2021-06-09 DIAGNOSIS — M54.16 LUMBAR RADICULOPATHY: ICD-10-CM

## 2021-06-09 DIAGNOSIS — M54.31 SCIATICA OF RIGHT SIDE: ICD-10-CM

## 2021-06-09 DIAGNOSIS — M51.36 DDD (DEGENERATIVE DISC DISEASE), LUMBAR: ICD-10-CM

## 2021-06-09 NOTE — TELEPHONE ENCOUNTER
Patient complains of persistent low back pain.  He had an exacerbation of his back pain on 5/31/2021.  He was seen at [facility] and was given what he describes as an epidural.  We are attempting to get records now.  He had some improvement immediately but then did a lot of gardening and moving around of patio furniture and the pain increased.  He would like to see a spine specialist.  Previous MRI on 5/28/2021 showed:  T12-L1: There is annular disk bulge and bilateral facet degeneration without significant central canal or neural foraminal narrowing.     L1-2: There is annular disk bulge and bilateral facet degeneration without significant central canal or neural foraminal narrowing.     L2-3: There is annular disk bulge and bilateral facet degeneration without significant central canal or neural foraminal narrowing.     L3-4: There is annular disk bulge and bilateral facet degeneration without significant central canal or neural foraminal narrowing.     L4-5: There is annular disc bulge and bilateral facet degeneration. No central canal narrowing. There is mild bilateral neuroforaminal narrowing.     L5-S1: There is annular disc bulge and bilateral facet degeneration. No central canal narrowing. There is mild bilateral neural foraminal narrowing.     IMPRESSION:     Multilevel degenerative disc disease and facet degeneration. No significant central canal narrowing. There is mild bilateral neural foraminal narrowing at L4-5 and L5-S1.    Discussed treatment options with patient.  We will refer him to Dr. Idalia Tom, PMR for further evaluation and treatment.  Additionally we will work on getting records from [facility].  Marylin Barry M.D.

## 2022-05-23 ENCOUNTER — HOSPITAL ENCOUNTER (OUTPATIENT)
Dept: LAB | Facility: MEDICAL CENTER | Age: 67
End: 2022-05-23
Attending: UROLOGY
Payer: MEDICARE

## 2022-05-23 LAB
MAGNESIUM SERPL-MCNC: 2.3 MG/DL (ref 1.5–2.5)
PSA SERPL-MCNC: 1.21 NG/ML (ref 0–4)

## 2022-05-23 PROCEDURE — 84630 ASSAY OF ZINC: CPT

## 2022-05-23 PROCEDURE — 83735 ASSAY OF MAGNESIUM: CPT | Mod: GA

## 2022-05-23 PROCEDURE — 36415 COLL VENOUS BLD VENIPUNCTURE: CPT | Mod: GA

## 2022-05-23 PROCEDURE — 84153 ASSAY OF PSA TOTAL: CPT | Mod: GA

## 2022-05-23 PROCEDURE — 82306 VITAMIN D 25 HYDROXY: CPT

## 2022-05-26 LAB — 25(OH)D3 SERPL-MCNC: 28 NG/ML (ref 30–80)

## 2022-05-27 LAB — ZINC SERPL-MCNC: 98.4 UG/DL (ref 60–120)

## 2022-06-22 DIAGNOSIS — I10 ESSENTIAL (PRIMARY) HYPERTENSION: ICD-10-CM

## 2022-06-22 RX ORDER — LOSARTAN POTASSIUM 100 MG/1
TABLET ORAL
Qty: 90 TABLET | Refills: 0 | Status: SHIPPED | OUTPATIENT
Start: 2022-06-22 | End: 2022-09-26

## 2022-06-22 NOTE — TELEPHONE ENCOUNTER
Received request via: Pharmacy    Was the patient seen in the last year in this department? No  LOV 05/12/2021  Does the patient have an active prescription (recently filled or refills available) for medication(s) requested? No

## 2022-09-07 NOTE — TELEPHONE ENCOUNTER
73 year old  1948  Primary Care Physician:Pcp Not In System     CC: MDT Implantable Loop Recorder, Atrial Flutter and Atrial Fibrillation      Summary: f/u scheduled to re-establish care for OAC management (PCP was doing it but now transferred to PCP outside of system). Was PRN f/u after ILR explanted. AF/FLT s/p ablation 2016, no recurrence, but hx of Bilat PEs and TIAs which prompted switch for Eliquis to Coumadin. Consult Heme for management?? EF normal 1/2022. Neg NMST 2016. Coumadin    Current medications:  warfarin (COUMADIN) 5 MG dosing per coumadin clinic.    Updates  Echo 1/2022  ILR explanted 12/2019    Plan from OV 7/2019:   · Will consider explanting device when no longer making connection  · Continue Coumadin  · F/U in 4 months    Impressions:   · Persistent Atrial flutter with rapid ventricular repsonse. Diagnosed on exam 12/11/15. Asymptomatic. S/P ablation 2/3/16. No recurrence.  · Paroxysmal Atrial fibrillation with rapid ventricular repsonse in ED on 2/7/16. Event monitor 2/8/16 shows atrial fibrillation symptomatic with palpitations/feeling heart racing, fatigue and shortness of breath. Fatigue and shortness of breath possibly due to beta blockers. Atrial fibrillation ablation (PVI) 5/25/16. No recurrence.   · First degree AVB- noted on EKG  · Sinus pause- seen on [employer] Implantable Loop Recorder.     · Occasional Premature Atrial contractions seen on [employer] Implantable Loop Recorder.                                                             · Hx of [employer] Implantable Loop Recorder - explanted 12/2019  · CHADS-VACS= 4 (Age x2, PE/DVT)  · Anticoagulation. Coumadin (Previously on Eliquis D/Jori 1/3/17) Coumadin was started for PE/DVTs. No bleeding.   Echocardiogram 1/27/2022: LVEF 60%, IVS 1.1 cm, no RWMA  Left Atrial Volume Index 48.4 ml/m², Right Ventricular Systolic Pressure 29.7 mmHg per echo 1/2022  Valves:no significant abnormalities noted on per echo 1/2022  · Negative NM  Patient is completely out, patient has an apt  7/31/18. He is requesting a call back as well. Please advise.   stress test 3/23/2016    Chronic considerations/issues:  · Prostate Cancer- s/p prostatectomy 5/14/18  · TIA and Left side paraesthesia 5/8/19 initally thought to be possible subarachnoid hemorrhage, Transferred to [facility] and evaluated by Neuro \"  MRI findings are chronic and are incidental findings. DWI is shine through artifact. Not acute stroke. He denies any significant head injury in last few years. \"  · Bilateral PEs and Bilateral DVTs 4/22/2017. No AF noted over 6 minutes surrounding event.      Date: Procedure:   2/3/2016 Successful radiofrequency ablation of typical cavotricuspid isthmus dependent atrial flutter.   5/23/2016 · Complex electrophysiology study with attempted induction of arrhythmia and Isuprel drug challenge.   · Pulmonary veins isolation using Cryoballoon for atrial fibrillation.   · Further Mapping and Ablation with irrigated RF (radiofrequency) ablation catheter in the left atrium.  · Parasternal Cell Cure Neuro[employer]iencestronic loop recorder implant   12/12/2019 Successful explantation of implantable loop recorder

## 2022-09-24 DIAGNOSIS — I10 ESSENTIAL (PRIMARY) HYPERTENSION: ICD-10-CM

## 2022-09-26 RX ORDER — LOSARTAN POTASSIUM 100 MG/1
TABLET ORAL
Qty: 90 TABLET | Refills: 0 | Status: SHIPPED | OUTPATIENT
Start: 2022-09-26 | End: 2022-12-21

## 2022-09-26 NOTE — TELEPHONE ENCOUNTER
Received request via: Pharmacy    Was the patient seen in the last year in this department? No  LOV : 5/12/2021  Does the patient have an active prescription (recently filled or refills available) for medication(s) requested? No

## 2022-11-04 ENCOUNTER — PATIENT MESSAGE (OUTPATIENT)
Dept: HEALTH INFORMATION MANAGEMENT | Facility: OTHER | Age: 67
End: 2022-11-04

## 2022-11-15 NOTE — PROGRESS NOTES
Subjective:     Chief Complaint   Patient presents with   • Hypotension     ED follow up        Chaparro Rosenberg is a 64 y.o. male here today for evaluation and management of:    Syncope  Patient had a syncopal episode on 4/20/19.  He had had 2 alcoholic drinks and went into the kitchen to make dinner.  He reports he had no warning fell to the floor.  He was seen at the [facility] emergency room and CT scan and EKG were normal.  He had been exercising more regularly and his blood pressure was a little low when he was seen.  This is the first time he has had a syncopal episode.  He denies any headaches.    Multiple-type hyperlipidemia  Patient has been watching his diet and is exercising more.  He would like to stay off a statin if possible.    History of tobacco abuse  Patient quit smoking in 1989.    Essential (primary) hypertension  Stable. Currently taking losartan 100 mg as directed.   He is not taking baby aspirin daily.   He is not monitoring BP at home.   Denies symptoms low BP: light-headed, tunnel-vision, unusual fatigue, except for the one episode of syncope  Denies symptoms high BP:pounding headache, visual changes, palpitations, flushed face.   Denies medicine side effects: unusual fatigue, slow heartbeat, foot/leg swelling, cough.         No Known Allergies    Current medicines (including changes today)  Current Outpatient Prescriptions   Medication Sig Dispense Refill   • losartan (COZAAR) 100 MG Tab Take 1 Tab by mouth every day. 90 Tab 3     No current facility-administered medications for this visit.        He  has a past medical history of Hyperlipidemia and Hypertension.    Patient Active Problem List    Diagnosis Date Noted   • Cellulitis 03/29/2018     Priority: High   • Syncope 05/08/2019   • Family history of stroke 05/08/2019   • History of tobacco abuse 05/08/2019   • Rupture of left distal biceps tendon 07/31/2018   • BPH with obstruction/lower urinary tract symptoms 06/02/2017   • Spermatocele  Surgery scheduled outpatient  Maintain splint and NWB  Analgesics PRN     "06/02/2017   • Essential (primary) hypertension 03/23/2016   • Multiple-type hyperlipidemia 03/23/2016       ROS   No fever or chills.  No nausea or vomiting.  No chest pain or palpitations.  No cough or SOB.  No pain with urination or hematuria.  No black or bloody stools.       Objective:     /80 (BP Location: Left arm, Patient Position: Sitting, BP Cuff Size: Adult)   Pulse 78   Temp 37.1 °C (98.7 °F) (Temporal)   Ht 1.626 m (5' 4\")   Wt 68.8 kg (151 lb 10.8 oz)   SpO2 99%  Body mass index is 26.04 kg/m².   Physical Exam:  Well developed, well nourished.  Alert, oriented in no acute distress.  Eye contact is good, speech goal directed, affect calm  Eyes: conjunctiva non-injected, sclera non-icteric.  Neck Supple.  No adenopathy or masses in the neck or supraclavicular regions. No thyromegaly  Lungs: clear to auscultation bilaterally with good excursion. No wheezes or rhonchi  CV: regular rate and rhythm. No murmur  Ext: no edema, color normal, vascularity normal, temperature normal          Assessment and Plan:   The following treatment plan was discussed    1. Essential (primary) hypertension  Check labs.  I discussed with patient that I would like him to start monitoring his blood pressure.  If we are seeing that it is on the lower right with his new exercise program will decrease the dose to 50 mg daily.  He will email me the information  - CMP14+LP    2. Syncope, unspecified syncope type  Check labs.  MRI of the brain to assess.  Ultrasound of the carotid arteries  - CBC WITH DIFFERENTIAL  - CMP14+LP  - MR-BRAIN-WITH & W/O; Future  - US-CAROTID DOPPLER BILAT; Future    3. Multiple-type hyperlipidemia  Check labs.  Await results.  Continue low-fat diet.  - CMP14+LP  - TSH+FREE T4    4. Family history of stroke  Ultrasound of the carotids    5. History of tobacco abuse  Screening ultrasound of the abdominal aorta given his history of tobacco abuse.  - US-ABDOMINAL AORTA W/O DOPPLER; Future    6. " Screening for AAA (abdominal aortic aneurysm)  Screening ultrasound of the abdominal aorta given his history of tobacco abuse.  - US-ABDOMINAL AORTA W/O DOPPLER; Future    Any change or worsening of signs or symptoms, patient encouraged to follow-up or report to the emergency room for further evaluation. Patient understands and agrees.    Followup: Return in about 1 year (around 5/8/2020).

## 2022-12-21 DIAGNOSIS — I10 ESSENTIAL (PRIMARY) HYPERTENSION: ICD-10-CM

## 2022-12-21 RX ORDER — LOSARTAN POTASSIUM 100 MG/1
TABLET ORAL
Qty: 30 TABLET | Refills: 0 | Status: SHIPPED | OUTPATIENT
Start: 2022-12-21 | End: 2023-01-26

## 2022-12-21 NOTE — TELEPHONE ENCOUNTER
Received request via: Pharmacy    Was the patient seen in the last year in this department? No informed he needs appt  5/12/21  Does the patient have an active prescription (recently filled or refills available) for medication(s) requested? No    Does the patient have alf Plus and need 100 day supply (blood pressure, diabetes and cholesterol meds only)? Patient does not have SCP

## 2023-03-02 DIAGNOSIS — I10 ESSENTIAL (PRIMARY) HYPERTENSION: ICD-10-CM

## 2023-03-02 RX ORDER — LOSARTAN POTASSIUM 100 MG/1
TABLET ORAL
Qty: 30 TABLET | Refills: 0 | Status: SHIPPED | OUTPATIENT
Start: 2023-03-02 | End: 2023-03-30

## 2023-03-02 NOTE — TELEPHONE ENCOUNTER
Received request via: Pharmacy    Was the patient seen in the last year in this department? No-pt informed via my chart to make an appt  5/12/21  Does the patient have an active prescription (recently filled or refills available) for medication(s) requested? No    Does the patient have shelter Plus and need 100 day supply (blood pressure, diabetes and cholesterol meds only)? Patient does not have SCP

## 2023-04-25 ENCOUNTER — HOSPITAL ENCOUNTER (OUTPATIENT)
Dept: LAB | Facility: MEDICAL CENTER | Age: 68
End: 2023-04-25
Attending: UROLOGY
Payer: MEDICARE

## 2023-04-25 LAB
25(OH)D3 SERPL-MCNC: 50 NG/ML (ref 30–100)
PSA SERPL-MCNC: 1.53 NG/ML (ref 0–4)

## 2023-04-25 PROCEDURE — 84153 ASSAY OF PSA TOTAL: CPT | Mod: GA

## 2023-04-25 PROCEDURE — 36415 COLL VENOUS BLD VENIPUNCTURE: CPT | Mod: GA

## 2023-04-25 PROCEDURE — 82306 VITAMIN D 25 HYDROXY: CPT

## 2023-05-01 DIAGNOSIS — I10 ESSENTIAL (PRIMARY) HYPERTENSION: ICD-10-CM

## 2023-05-01 RX ORDER — LOSARTAN POTASSIUM 100 MG/1
TABLET ORAL
Qty: 30 TABLET | Refills: 0 | Status: SHIPPED | OUTPATIENT
Start: 2023-05-01 | End: 2023-05-01 | Stop reason: SDUPTHER

## 2023-05-01 RX ORDER — LOSARTAN POTASSIUM 100 MG/1
100 TABLET ORAL
Qty: 90 TABLET | Refills: 0 | Status: SHIPPED | OUTPATIENT
Start: 2023-05-01

## 2023-05-01 NOTE — TELEPHONE ENCOUNTER
Received request via: Patient    Was the patient seen in the last year in this department? No 5/12/21    Patient informed appt is due.     Does the patient have an active prescription (recently filled or refills available) for medication(s) requested? No    Does the patient have [facility] Plus and need 100 day supply (blood pressure, diabetes and cholesterol meds only)? Patient does not have SCP
